# Patient Record
Sex: FEMALE | Race: WHITE | NOT HISPANIC OR LATINO | Employment: PART TIME | URBAN - METROPOLITAN AREA
[De-identification: names, ages, dates, MRNs, and addresses within clinical notes are randomized per-mention and may not be internally consistent; named-entity substitution may affect disease eponyms.]

---

## 2017-07-13 ENCOUNTER — APPOINTMENT (OUTPATIENT)
Dept: RADIOLOGY | Facility: IMAGING CENTER | Age: 37
End: 2017-07-13
Attending: NURSE PRACTITIONER
Payer: MEDICARE

## 2017-07-13 ENCOUNTER — OFFICE VISIT (OUTPATIENT)
Dept: URGENT CARE | Facility: CLINIC | Age: 37
End: 2017-07-13
Payer: MEDICARE

## 2017-07-13 VITALS
RESPIRATION RATE: 16 BRPM | BODY MASS INDEX: 46.88 KG/M2 | OXYGEN SATURATION: 98 % | TEMPERATURE: 98.2 F | SYSTOLIC BLOOD PRESSURE: 106 MMHG | HEART RATE: 92 BPM | WEIGHT: 248 LBS | DIASTOLIC BLOOD PRESSURE: 80 MMHG

## 2017-07-13 DIAGNOSIS — K59.00 CONSTIPATION, UNSPECIFIED CONSTIPATION TYPE: ICD-10-CM

## 2017-07-13 DIAGNOSIS — R10.84 GENERALIZED ABDOMINAL PAIN: ICD-10-CM

## 2017-07-13 PROCEDURE — 99214 OFFICE O/P EST MOD 30 MIN: CPT | Performed by: NURSE PRACTITIONER

## 2017-07-13 PROCEDURE — 74020 DX-ABDOMEN-2 VIEWS: CPT | Mod: TC | Performed by: NURSE PRACTITIONER

## 2017-07-13 RX ORDER — POLYETHYLENE GLYCOL 3350 17 G/17G
17 POWDER, FOR SOLUTION ORAL
Qty: 10 EACH | Refills: 0 | Status: SHIPPED | OUTPATIENT
Start: 2017-07-13 | End: 2018-11-20

## 2017-07-13 ASSESSMENT — ENCOUNTER SYMPTOMS
CHILLS: 0
DIARRHEA: 0
NAUSEA: 0
CONSTIPATION: 0
DIZZINESS: 0
ABDOMINAL PAIN: 1
HEADACHES: 0
BACK PAIN: 0
HEARTBURN: 0
FEVER: 0

## 2017-07-13 ASSESSMENT — LIFESTYLE VARIABLES: SUBSTANCE_ABUSE: 0

## 2017-07-13 NOTE — PROGRESS NOTES
Subjective:      Erin Santiago is a 36 y.o. female who presents with Abdominal Pain    PFSH reviewed and updated as necessary in EPIC electronic record with patient today.  Medications including OTC medications reviewed with patient.         Allergies   Allergen Reactions   • Acetaminophen      Unknown per patient and caregiver   • Other Misc      bees   • Penicillins Hives     Per patien   • Shellfish Allergy Hives     Get hives per patient     BIB .        HPI Having an allergic reaction to food she ate today. Her chest and stomach started to hurt 3 days ago. It has continued to hurt in epigastric. Denies c/p at rest but feels pain when she pushes hard on chest. Mild nausea most of the day. She has not eaten today. Hard BM today. Reports that it hurts when she tries to go to the bathroom to poop because it is hard. No other aggravating or alleviating factors.       Review of Systems   Unable to perform ROS: mental acuity   Constitutional: Negative for fever, chills and malaise/fatigue.   Gastrointestinal: Positive for abdominal pain. Negative for heartburn, nausea, diarrhea and constipation (??).   Musculoskeletal: Negative for back pain.   Skin: Negative for rash.   Neurological: Negative for dizziness and headaches.   Endo/Heme/Allergies: Negative for environmental allergies.   Psychiatric/Behavioral: Negative for substance abuse.          Objective:     /80 mmHg  Pulse 92  Temp(Src) 36.8 °C (98.2 °F)  Resp 16  Wt 112.492 kg (248 lb)  SpO2 98%     Physical Exam   Constitutional: She is oriented to person, place, and time. She appears well-developed and well-nourished.   HENT:   Head: Normocephalic.   Eyes: Pupils are equal, round, and reactive to light.   Neck: Normal range of motion.   Cardiovascular: Normal rate, regular rhythm and normal heart sounds.    Pulmonary/Chest: Effort normal and breath sounds normal. No respiratory distress.   Abdominal: Soft. Normal appearance. Bowel  sounds are decreased. There is no hepatosplenomegaly. There is tenderness in the epigastric area and left upper quadrant. There is no rigidity, no rebound, no guarding, no CVA tenderness, no tenderness at McBurney's point and negative Gonzalez's sign.       Musculoskeletal: Normal range of motion.   Neurological: She is alert and oriented to person, place, and time.   Skin: Skin is warm, dry and intact.   Psychiatric: She has a normal mood and affect. Her behavior is normal. Judgment and thought content normal.   Nursing note and vitals reviewed.         7/13/2017 5:25 PM    HISTORY/REASON FOR EXAM:  Gastrointestinal Complaint.  Upper abdominal pain since yesterday    TECHNIQUE/EXAM DESCRIPTION AND NUMBER OF VIEWS:  2 view(s) of the abdomen.    COMPARISON: None    FINDINGS:  No evidence for small bowel obstruction.  Increased colonic stool.  No evidence for pneumoperitoneum.  No gross mass or abnormal calcification.  No major bony abnormality is seen.         Impression        1.  No evidence of bowel obstruction or perforation.  2.  Increased colonic stool.         Reading Provider Reading Date     Ruy Méndez M.D. Jul 13, 2017          Assessment/Plan:     1. Generalized abdominal pain  [id number] VIEWS   2. Constipation, unspecified constipation type  polyethylene glycol/lytes (MIRALAX) Pack     Educated in proper administration of medication(s) ordered today including safety, possible SE, risks, benefits, rationale and alternatives to therapy.   Return to clinic or PCP 4-5 days if current symptoms are not resolving in a satisfactory manner or sooner if new or worsening symptoms occur.   Patient and or family advised differential diagnoses, signs and symptoms which would warrant further evaluation and /or emergent evaluation.   Patient was in agreement with this treatment plan and seemed to understand without barriers. All questions were encouraged and answered  Pt education done. Aftercare instructions given  to pt/ caregiver. Questions answered. Verbalizes good understanding.   Keep well hydrated  Increase fiber  In diet  Increase ambulation.   May return to work without restrictions if she feels well. Note given to her.   Results of all diagnostic tests/ screens discussed with patient.

## 2017-07-13 NOTE — MR AVS SNAPSHOT
Erin Santiago   2017 3:00 PM   Office Visit   MRN: [id number]    Department:  [facility]   Dept Phone:  [phone]    Description:  Female : 1980   Provider:  JAVIER Whittaker           Reason for Visit     Abdominal Pain x 2 days, mid upper abdominal pain and mid chest after eating a salad.  Pain is constant.      Allergies as of 2017     Allergen Noted Reactions    Acetaminophen 2016       Unknown per patient and caregiver    Other Misc 2014       bees    Penicillins 10/19/2007   Hives    Per patien    Shellfish Allergy 2014   Hives    Get hives per patient      You were diagnosed with     Generalized abdominal pain   [559974]       Constipation, unspecified constipation type   [8461180]         Vital Signs     Blood Pressure Pulse Temperature Respirations Weight Oxygen Saturation    106/80 mmHg 92 36.8 °C (98.2 °F) 16 112.492 kg (248 lb) 98%    Smoking Status                   Former Smoker           Basic Information     Date Of Birth Sex Race Ethnicity Preferred Language    1980 Female White Non- English      Problem List              ICD-10-CM Priority Class Noted - Resolved    Other specified noninflammatory disorder of vulva and perineum N90.89   2014 - Present    Partial epilepsy with impairment of consciousness, intractable (CMS-[facility]) G40.219   2014 - Present    Bipolar 1 disorder, depressed, moderate (CMS-[facility]) F31.32   2014 - Present      Health Maintenance        Date Due Completion Dates    IMM HEP B VACCINE (1 of 3 - Primary Series) 1978 ---    IMM DTaP/Tdap/Td Vaccine (1 - Tdap) 1997 ---    PAP SMEAR 1999 ---    IMM INFLUENZA (1) 2017 ---            Current Immunizations     No immunizations on file.      Below and/or attached are the medications your provider expects you to take. Review all of your home medications and newly ordered medications with your provider and/or  pharmacist. Follow medication instructions as directed by your provider and/or pharmacist. Please keep your medication list with you and share with your provider. Update the information when medications are discontinued, doses are changed, or new medications (including over-the-counter products) are added; and carry medication information at all times in the event of emergency situations     Allergies:  ACETAMINOPHEN - (reactions not documented)     OTHER MISC - (reactions not documented)     PENICILLINS - Hives     SHELLFISH ALLERGY - Hives               Medications  Valid as of: July 13, 2017 -  6:04 PM    Generic Name Brand Name Tablet Size Instructions for use    Ascorbic Acid (Tab) ascorbic acid 500 MG Take 500 mg by mouth every day.        Calcium (Tab) Calcium 600 MG Take 1 Tab by mouth every day.        Citalopram Hydrobromide   Take 40 mg by mouth every day.        Fluticasone Propionate (Suspension) FLONASE 50 MCG/ACT Spray 1 Spray in nose 2 times a day. Each Nostril         Ibuprofen (Cap) Ibuprofen 200 MG Take  by mouth.        LamoTRIgine (Tab) LAMICTAL 25 MG Take 1 Tab by mouth every day.        LevETIRAcetam   Take 250 mg by mouth 2 Times a Day. Indications: 1 in the morning, 2 at night        MetFORMIN HCl (Tab) GLUCOPHAGE 500 MG Take 500 mg by mouth every day.        Misc. Devices (Misc) Wheelchair  Wheelchair for use while unable to ambulate        NON SPECIFIED   Currently taking another medication to control anger.   Patient do not remember name of medication.        Polyethylene Glycol 3350 (Pack) MIRALAX  Take 1 Packet by mouth 1 time daily as needed.        QUEtiapine Fumarate (Tab) SEROQUEL 25 MG Take 25 mg by mouth every day.        TraMADol HCl (Tab) ULTRAM 50 MG Take 1-2 Tabs by mouth every four hours as needed for Mild Pain.        .                 Medicines prescribed today were sent to:     [facility] - [facility] - [id number]    [address] [place]  [address]    Phone: [phone] Fax: [phone]    Open 24 Hours?: No      Medication refill instructions:       If your prescription bottle indicates you have medication refills left, it is not necessary to call your provider’s office. Please contact your pharmacy and they will refill your medication.    If your prescription bottle indicates you do not have any refills left, you may request refills at any time through one of the following ways: The online [employer] system (except Urgent Care), by calling your provider’s office, or by asking your pharmacy to contact your provider’s office with a refill request. Medication refills are processed only during regular business hours and may not be available until the next business day. Your provider may request additional information or to have a follow-up visit with you prior to refilling your medication.   *Please Note: Medication refills are assigned a new Rx number when refilled electronically. Your pharmacy may indicate that no refills were authorized even though a new prescription for the same medication is available at the pharmacy. Please request the medicine by name with the pharmacy before contacting your provider for a refill.        Your To Do List     Future Labs/Procedures Complete By Expires    [id number] VIEWS  As directed 7/13/2018      Instructions    Constipation, Adult  Constipation is when a person has fewer than three bowel movements a week, has difficulty having a bowel movement, or has stools that are dry, hard, or larger than normal. As people grow older, constipation is more common. A low-fiber diet, not taking in enough fluids, and taking certain medicines may make constipation worse.   CAUSES   · Certain medicines, such as antidepressants, pain medicine, iron supplements, antacids, and water pills.    · Certain diseases, such as diabetes, irritable bowel syndrome (IBS), thyroid disease, or depression.    · Not drinking enough water.    · Not  eating enough fiber-rich foods.    · Stress or travel.    · Lack of physical activity or exercise.    · Ignoring the urge to have a bowel movement.    · Using laxatives too much.    SIGNS AND SYMPTOMS   · Having fewer than three bowel movements a week.    · Straining to have a bowel movement.    · Having stools that are hard, dry, or larger than normal.    · Feeling full or bloated.    · Pain in the lower abdomen.    · Not feeling relief after having a bowel movement.    DIAGNOSIS   Your health care provider will take a medical history and perform a physical exam. Further testing may be done for severe constipation. Some tests may include:  · A barium enema X-ray to examine your rectum, colon, and, sometimes, your small intestine.    · A sigmoidoscopy to examine your lower colon.    · A colonoscopy to examine your entire colon.  TREATMENT   Treatment will depend on the severity of your constipation and what is causing it. Some dietary treatments include drinking more fluids and eating more fiber-rich foods. Lifestyle treatments may include regular exercise. If these diet and lifestyle recommendations do not help, your health care provider may recommend taking over-the-counter laxative medicines to help you have bowel movements. Prescription medicines may be prescribed if over-the-counter medicines do not work.   HOME CARE INSTRUCTIONS   · Eat foods that have a lot of fiber, such as fruits, vegetables, whole grains, and beans.  · Limit foods high in fat and processed sugars, such as french fries, hamburgers, cookies, candies, and soda.    · A fiber supplement may be added to your diet if you cannot get enough fiber from foods.    · Drink enough fluids to keep your urine clear or pale yellow.    · Exercise regularly or as directed by your health care provider.    · Go to the restroom when you have the urge to go. Do not hold it.    · Only take over-the-counter or prescription medicines as directed by your health care  provider. Do not take other medicines for constipation without talking to your health care provider first.    SEEK IMMEDIATE MEDICAL CARE IF:   · You have bright red blood in your stool.    · Your constipation lasts for more than 4 days or gets worse.    · You have abdominal or rectal pain.    · You have thin, pencil-like stools.    · You have unexplained weight loss.  MAKE SURE YOU:   · Understand these instructions.  · Will watch your condition.  · Will get help right away if you are not doing well or get worse.     This information is not intended to replace advice given to you by your health care provider. Make sure you discuss any questions you have with your health care provider.     Document Released: 09/15/2005 Document Revised: 01/08/2016 Document Reviewed: 09/29/2014  [employer] Interactive Patient Education ©2016 [employer]            [employer] Access Code: [email or web address]  Expires: 8/12/2017  6:04 PM    [employer]  A secure, online tool to manage your health information     [employer]s [employer]® is a secure, online tool that connects you to your personalized health information from the privacy of your home -- day or night - making it very easy for you to manage your healthcare. Once the activation process is completed, you can even access your medical information using the [employer] john, which is available for free in the Apple John store or Google Play store.     [employer] provides the following levels of access (as shown below):   My Chart Features   [facility] Primary [facility] Doctor [facility]  Specialists [facility]  Urgent  Care Non-[facility]  Primary Care  Doctor   Email your healthcare team securely and privately 24/7 X X X    Manage appointments: schedule your next appointment; view details of past/upcoming appointments X      Request prescription refills. X      View recent personal medical records, including lab and immunizations X X X X   View health record, including health history, allergies, medications X X X X    Read reports about your outpatient visits, procedures, consult and ER notes X X X X   See your discharge summary, which is a recap of your hospital and/or ER visit that includes your diagnosis, lab results, and care plan. X X       How to register for [employer]:  1. Go to  [email or web address].  2. Click on the Sign Up Now box, which takes you to the New Member Sign Up page. You will need to provide the following information:  a. Enter your [employer]Copyright Access Code exactly as it appears at the top of this page. (You will not need to use this code after you’ve completed the sign-up process. If you do not sign up before the expiration date, you must request a new code.)   b. Enter your date of birth.   c. Enter your home email address.   d. Click Submit, and follow the next screen’s instructions.  3. Create a [employer] ID. This will be your iCopyright login ID and cannot be changed, so think of one that is secure and easy to remember.  4. Create a [employer] password. You can change your password at any time.  5. Enter your Password Reset Question and Answer. This can be used at a later time if you forget your password.   6. Enter your e-mail address. This allows you to receive e-mail notifications when new information is available in [employer]Copyright.  7. Click Sign Up. You can now view your health information.    For assistance activating your [employer] account, call [phone]

## 2017-07-13 NOTE — Clinical Note
July 13, 2017       Patient: Erin Santiago   YOB: 1980   Date of Visit: 7/13/2017         To Whom It May Concern:    It is my medical opinion that Erin Santiago remain out of work until 07/14/17.              Sincerely,          JAVIER Whittaker  Electronically Signed

## 2017-07-14 NOTE — PATIENT INSTRUCTIONS
Constipation, Adult  Constipation is when a person has fewer than three bowel movements a week, has difficulty having a bowel movement, or has stools that are dry, hard, or larger than normal. As people grow older, constipation is more common. A low-fiber diet, not taking in enough fluids, and taking certain medicines may make constipation worse.   CAUSES   · Certain medicines, such as antidepressants, pain medicine, iron supplements, antacids, and water pills.    · Certain diseases, such as diabetes, irritable bowel syndrome (IBS), thyroid disease, or depression.    · Not drinking enough water.    · Not eating enough fiber-rich foods.    · Stress or travel.    · Lack of physical activity or exercise.    · Ignoring the urge to have a bowel movement.    · Using laxatives too much.    SIGNS AND SYMPTOMS   · Having fewer than three bowel movements a week.    · Straining to have a bowel movement.    · Having stools that are hard, dry, or larger than normal.    · Feeling full or bloated.    · Pain in the lower abdomen.    · Not feeling relief after having a bowel movement.    DIAGNOSIS   Your health care provider will take a medical history and perform a physical exam. Further testing may be done for severe constipation. Some tests may include:  · A barium enema X-ray to examine your rectum, colon, and, sometimes, your small intestine.    · A sigmoidoscopy to examine your lower colon.    · A colonoscopy to examine your entire colon.  TREATMENT   Treatment will depend on the severity of your constipation and what is causing it. Some dietary treatments include drinking more fluids and eating more fiber-rich foods. Lifestyle treatments may include regular exercise. If these diet and lifestyle recommendations do not help, your health care provider may recommend taking over-the-counter laxative medicines to help you have bowel movements. Prescription medicines may be prescribed if over-the-counter medicines do not work.    HOME CARE INSTRUCTIONS   · Eat foods that have a lot of fiber, such as fruits, vegetables, whole grains, and beans.  · Limit foods high in fat and processed sugars, such as french fries, hamburgers, cookies, candies, and soda.    · A fiber supplement may be added to your diet if you cannot get enough fiber from foods.    · Drink enough fluids to keep your urine clear or pale yellow.    · Exercise regularly or as directed by your health care provider.    · Go to the restroom when you have the urge to go. Do not hold it.    · Only take over-the-counter or prescription medicines as directed by your health care provider. Do not take other medicines for constipation without talking to your health care provider first.    SEEK IMMEDIATE MEDICAL CARE IF:   · You have bright red blood in your stool.    · Your constipation lasts for more than 4 days or gets worse.    · You have abdominal or rectal pain.    · You have thin, pencil-like stools.    · You have unexplained weight loss.  MAKE SURE YOU:   · Understand these instructions.  · Will watch your condition.  · Will get help right away if you are not doing well or get worse.     This information is not intended to replace advice given to you by your health care provider. Make sure you discuss any questions you have with your health care provider.     Document Released: 09/15/2005 Document Revised: 01/08/2016 Document Reviewed: 09/29/2014  [employer] Interactive Patient Education ©2016 [employer]

## 2018-01-19 ENCOUNTER — HOSPITAL ENCOUNTER (OUTPATIENT)
Facility: MEDICAL CENTER | Age: 38
End: 2018-01-19
Payer: COMMERCIAL

## 2018-01-19 LAB
ALBUMIN SERPL BCP-MCNC: 4.7 G/DL (ref 3.2–4.9)
ALBUMIN/GLOB SERPL: 1.8 G/DL
ALP SERPL-CCNC: 54 U/L (ref 30–99)
ALT SERPL-CCNC: 11 U/L (ref 2–50)
ANION GAP SERPL CALC-SCNC: 6 MMOL/L (ref 0–11.9)
AST SERPL-CCNC: 12 U/L (ref 12–45)
BDY FAT % MEASURED: 44 %
BILIRUB SERPL-MCNC: 0.7 MG/DL (ref 0.1–1.5)
BP DIAS: 78 MMHG
BP SYS: 122 MMHG
BUN SERPL-MCNC: 11 MG/DL (ref 8–22)
CALCIUM SERPL-MCNC: 8.9 MG/DL (ref 8.5–10.5)
CHLORIDE SERPL-SCNC: 103 MMOL/L (ref 96–112)
CHOLEST SERPL-MCNC: 112 MG/DL (ref 100–199)
CO2 SERPL-SCNC: 29 MMOL/L (ref 20–33)
CREAT SERPL-MCNC: 0.92 MG/DL (ref 0.5–1.4)
DIABETES HTDIA: YES
EST. AVERAGE GLUCOSE BLD GHB EST-MCNC: 108 MG/DL
EVENT NAME HTEVT: NORMAL
GLOBULIN SER CALC-MCNC: 2.6 G/DL (ref 1.9–3.5)
GLUCOSE SERPL-MCNC: 103 MG/DL (ref 65–99)
HBA1C MFR BLD: 5.4 % (ref 0–5.6)
HDLC SERPL-MCNC: 55 MG/DL
HYPERTENSION HTHYP: NO
LDLC SERPL CALC-MCNC: 45 MG/DL
POTASSIUM SERPL-SCNC: 4.3 MMOL/L (ref 3.6–5.5)
PROT SERPL-MCNC: 7.3 G/DL (ref 6–8.2)
SCREENING LOC CITY HTCIT: NORMAL
SCREENING LOC STATE HTSTA: NORMAL
SCREENING LOCATION HTLOC: NORMAL
SODIUM SERPL-SCNC: 138 MMOL/L (ref 135–145)
SUBSCRIBER ID HTSID: NORMAL
TRIGL SERPL-MCNC: 60 MG/DL (ref 0–149)

## 2018-03-15 ENCOUNTER — APPOINTMENT (OUTPATIENT)
Dept: NEUROLOGY | Facility: MEDICAL CENTER | Age: 38
End: 2018-03-15
Payer: MEDICARE

## 2018-07-17 ENCOUNTER — OFFICE VISIT (OUTPATIENT)
Dept: NEUROLOGY | Facility: MEDICAL CENTER | Age: 38
End: 2018-07-17
Payer: MEDICARE

## 2018-07-17 VITALS
DIASTOLIC BLOOD PRESSURE: 74 MMHG | HEIGHT: 60 IN | TEMPERATURE: 98.4 F | RESPIRATION RATE: 16 BRPM | SYSTOLIC BLOOD PRESSURE: 110 MMHG | BODY MASS INDEX: 39.93 KG/M2 | HEART RATE: 75 BPM | WEIGHT: 203.4 LBS | OXYGEN SATURATION: 99 %

## 2018-07-17 DIAGNOSIS — R56.9 SEIZURE (HCC): ICD-10-CM

## 2018-07-17 DIAGNOSIS — R63.4 WEIGHT LOSS: ICD-10-CM

## 2018-07-17 DIAGNOSIS — F31.32 BIPOLAR 1 DISORDER, DEPRESSED, MODERATE (HCC): ICD-10-CM

## 2018-07-17 DIAGNOSIS — G20.C PARKINSONISM, UNSPECIFIED PARKINSONISM TYPE: ICD-10-CM

## 2018-07-17 DIAGNOSIS — E28.2 POLYCYSTIC OVARY: ICD-10-CM

## 2018-07-17 DIAGNOSIS — R41.89 COGNITIVE DECLINE: ICD-10-CM

## 2018-07-17 DIAGNOSIS — G40.219 PARTIAL EPILEPSY WITH IMPAIRMENT OF CONSCIOUSNESS, INTRACTABLE (HCC): ICD-10-CM

## 2018-07-17 PROBLEM — G20.A1 PARKINSON DISEASE (HCC): Status: ACTIVE | Noted: 2018-07-17

## 2018-07-17 PROCEDURE — 99204 OFFICE O/P NEW MOD 45 MIN: CPT | Performed by: PSYCHIATRY & NEUROLOGY

## 2018-07-17 NOTE — PATIENT INSTRUCTIONS
Resting tremor - both sides-- L>R  Rigidity both sides  Bradykinesia both sides  Postural reflex : tendency to fall  Masked face,     IMPRESSION:    1. Bradykinesia and some tremor noticed in the past 2 years or so-- consistent with Parkinsonism Stage III  2. Mentally challenged -- was in group home for years  3. Hx of seizure, Psychiatric   4. Family hx of psychiatric disorder   5. Weight loss    PLAN/RECOMMENDATIONS:    I do agree with the legal guardian that the patient has parkinsonism    We will offer MRI of brain, EEG and blood tests  mo      ________________________________________________________________________    Drug-induced parkinsonism is the second most common cause of parkinsonism after Parkinson's disease and their distinction has crucial implications in terms of management and prognosis. However, differentiating between these conditions can be challenging on a clinical ground, especially in the early stages. ( actually, traditional views, it is impossible to differentiate these two based on clinical examination only)    Parkinsonism Relat Disord. 2014 Aug;20(8):808-14. doi: 10.1016/j.parkreldis.2014.05.011. Epub 2014 Trey 3.  Differentiating drug-induced parkinsonism from Parkinson's disease: an update on non-motor symptoms and investigations.  Sumeet F1, Erro R2, Marangi A3, Dorantes K4, Tinazzi M3.    Radiology. 2016 Jun;279(3):849-58. doi: 10.1148/[id number]. Epub 2015 Dec 21.  Drug-induced Parkinsonism versus Idiopathic Parkinson Disease: Utility of Nigrosome 1 with 3-T Imaging.    Sung YH1, Noh Y1, Thomas J1, Tere EY1.  ________________________________________________________________________              SIGNATURE:  Lubna Cerrato    CC:  Ulisses Fierro M.D.
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
## 2018-07-23 LAB
25(OH)D3+25(OH)D2 SERPL-MCNC: 37.4 NG/ML (ref 30–100)
ANA SER QL: NEGATIVE
BUN SERPL-MCNC: 13 MG/DL (ref 6–20)
BUN/CREAT SERPL: 15 (ref 9–23)
CALCIUM SERPL-MCNC: 9.2 MG/DL (ref 8.7–10.2)
CARDIOLIPIN IGA SER IA-ACNC: <9 APL U/ML (ref 0–11)
CARDIOLIPIN IGG SER IA-ACNC: <9 GPL U/ML (ref 0–14)
CARDIOLIPIN IGM SER IA-ACNC: 22 MPL U/ML (ref 0–12)
CHLORIDE SERPL-SCNC: 104 MMOL/L (ref 96–106)
CO2 SERPL-SCNC: 22 MMOL/L (ref 20–29)
CREAT SERPL-MCNC: 0.85 MG/DL (ref 0.57–1)
CRP SERPL-MCNC: 6.6 MG/L (ref 0–4.9)
ERYTHROCYTE [SEDIMENTATION RATE] IN BLOOD BY WESTERGREN METHOD: 2 MM/HR (ref 0–32)
GLUCOSE SERPL-MCNC: 93 MG/DL (ref 65–99)
IF AFRICAN AMERICAN  100797: 100 ML/MIN/1.73
IF NON AFRICAN AMER 100791: 87 ML/MIN/1.73
MAGNESIUM SERPL-MCNC: 2 MG/DL (ref 1.6–2.3)
POTASSIUM SERPL-SCNC: 4.6 MMOL/L (ref 3.5–5.2)
SODIUM SERPL-SCNC: 142 MMOL/L (ref 134–144)
THYROGLOB AB SERPL-ACNC: <1 IU/ML (ref 0–0.9)
THYROPEROXIDASE AB SERPL-ACNC: 14 IU/ML (ref 0–34)
TSH SERPL DL<=0.005 MIU/L-ACNC: 0.77 UIU/ML (ref 0.45–4.5)
VIT B1 BLD-SCNC: 208.3 NMOL/L (ref 66.5–200)
VIT B12 SERPL-MCNC: 661 PG/ML (ref 232–1245)
VIT B6 SERPL-MCNC: 9.5 UG/L (ref 2–32.8)

## 2018-07-31 ENCOUNTER — HOSPITAL ENCOUNTER (OUTPATIENT)
Dept: RADIOLOGY | Facility: MEDICAL CENTER | Age: 38
End: 2018-07-31
Attending: PSYCHIATRY & NEUROLOGY
Payer: MEDICARE

## 2018-07-31 DIAGNOSIS — R56.9 SEIZURE (HCC): ICD-10-CM

## 2018-07-31 DIAGNOSIS — E28.2 POLYCYSTIC OVARY: ICD-10-CM

## 2018-07-31 DIAGNOSIS — R63.4 WEIGHT LOSS: ICD-10-CM

## 2018-07-31 DIAGNOSIS — G20.C PARKINSONISM, UNSPECIFIED PARKINSONISM TYPE: ICD-10-CM

## 2018-07-31 DIAGNOSIS — F31.32 BIPOLAR 1 DISORDER, DEPRESSED, MODERATE (HCC): ICD-10-CM

## 2018-07-31 DIAGNOSIS — G40.219 PARTIAL EPILEPSY WITH IMPAIRMENT OF CONSCIOUSNESS, INTRACTABLE (HCC): ICD-10-CM

## 2018-07-31 DIAGNOSIS — R41.89 COGNITIVE DECLINE: ICD-10-CM

## 2018-07-31 PROCEDURE — A9585 GADOBUTROL INJECTION: HCPCS | Performed by: PSYCHIATRY & NEUROLOGY

## 2018-07-31 PROCEDURE — 700117 HCHG RX CONTRAST REV CODE 255: Performed by: PSYCHIATRY & NEUROLOGY

## 2018-07-31 PROCEDURE — 70553 MRI BRAIN STEM W/O & W/DYE: CPT

## 2018-07-31 RX ORDER — GADOBUTROL 604.72 MG/ML
10 INJECTION INTRAVENOUS ONCE
Status: COMPLETED | OUTPATIENT
Start: 2018-07-31 | End: 2018-07-31

## 2018-07-31 RX ADMIN — GADOBUTROL 10 ML: 604.72 INJECTION INTRAVENOUS at 09:45

## 2018-11-09 ENCOUNTER — TELEPHONE (OUTPATIENT)
Dept: NEUROLOGY | Facility: MEDICAL CENTER | Age: 38
End: 2018-11-09

## 2018-11-09 ENCOUNTER — NON-PROVIDER VISIT (OUTPATIENT)
Dept: NEUROLOGY | Facility: MEDICAL CENTER | Age: 38
End: 2018-11-09
Payer: MEDICARE

## 2018-11-09 DIAGNOSIS — G40.219 PARTIAL EPILEPSY WITH IMPAIRMENT OF CONSCIOUSNESS, INTRACTABLE (HCC): ICD-10-CM

## 2018-11-09 DIAGNOSIS — R56.9 SEIZURE (HCC): ICD-10-CM

## 2018-11-09 DIAGNOSIS — R63.4 WEIGHT LOSS: ICD-10-CM

## 2018-11-09 DIAGNOSIS — G20.C PARKINSONISM, UNSPECIFIED PARKINSONISM TYPE: ICD-10-CM

## 2018-11-09 DIAGNOSIS — F31.32 BIPOLAR 1 DISORDER, DEPRESSED, MODERATE (HCC): ICD-10-CM

## 2018-11-09 DIAGNOSIS — R41.89 COGNITIVE DECLINE: ICD-10-CM

## 2018-11-09 PROCEDURE — 95816 EEG AWAKE AND DROWSY: CPT | Performed by: PSYCHIATRY & NEUROLOGY

## 2018-11-09 NOTE — PROCEDURES
ELECTROENCEPHALOGRAM REPORT      Referring provider: Dr. ARIAS    DOS:   11/9/2018      INDICATION:  Erin Santiago 38 y.o. female presenting with EEG for pt w/ a history of seizures- no eeg since 2014. sister noticed the parient become more slow within the last 2 years. Pt has been in group home since age 13 y.o, sister is now guardian, Pt was put in group home due to behavior issues.  Pt reports she had been seizure free and doing well on current seizure medication.     HX of phychiatric, bradykinesia, parkinsonism, anxiety, depression, diabetes, sleep apnea, epilepsy.    CURRENT ANTIEPILEPTIC REGIMEN: Keppra + Lamictal     DURATION: 24 minutes      TECHNIQUE: A 30-channel video electroencephalogram (VEEG) was performed in accordance with the international 10-20 system. This digital study was reviewed in bipolar and referential montages. The recording examined the patient during wakeful, drowsy and sleep states.         DESCRIPTION OF THE RECORD:  During the awake state, background shows symmetrical 9-10 Hz alpha activity posteriorly with amplitude of 70 mV.  There was reactivity with eye opening/closure.  Normal anterior-posterior gradient was noted with faster beta frequencies seen anteriorly.  During drowsiness, high-amplitude delta frequencies were seen.    During the sleep state, background shows diffuse high-amplitude 4-5 Hz delta activity.  Symmetrical high-amplitude sleep spindles and vertex sharp activities were seen in the central leads.    ACTIVATION PROCEDURES:   Hyperventilation was performed by the patient. The technician performing the test noted good effort. This technique produced electroencephalographic changes in keeping with the expected bilaterally synchronous, frontally predominant, high amplitude slow waves build up.     Intermittent Photic stimulation was  performed in a stepwise fashion from 1 to 30 Hz and elicited a normal response (photic driving), most noticeable in the posterior  leads.      ICTAL AND/OR INTERICTAL FINDINGS:   No focal or generalized epileptiform activity was noted. No regional slowing was seen during this study.  No seizures were recorded during the study.     EVENT(S):  NONE    EKG: sampling review of EKG recording demonstrated regular rhythm      INTERPRETATION:       ________________________________________________________________________    This is normal routine EEG recording in the awake and drowsy/sleep state(s).    This scalp EEG remains not remarkable    Of note, unremarkable EEG does not completely exclude the diagnosis  of seizures since seizure is an episodic phenomena and frontal lobe seizures could have normal scalp EEG. Clinical correlation may help     If clinical suspicion of seizure remains high.  Prolonged outpatient EEG   monitoring may be of help.    EEG 11/09/18 8:22 PM  ________________________________________________________________________

## 2018-11-14 ENCOUNTER — TELEPHONE (OUTPATIENT)
Dept: NEUROLOGY | Facility: MEDICAL CENTER | Age: 38
End: 2018-11-14

## 2018-11-20 ENCOUNTER — OFFICE VISIT (OUTPATIENT)
Dept: NEUROLOGY | Facility: MEDICAL CENTER | Age: 38
End: 2018-11-20
Payer: MEDICARE

## 2018-11-20 VITALS
SYSTOLIC BLOOD PRESSURE: 120 MMHG | OXYGEN SATURATION: 98 % | RESPIRATION RATE: 16 BRPM | WEIGHT: 197.8 LBS | TEMPERATURE: 97.7 F | HEART RATE: 81 BPM | BODY MASS INDEX: 38.83 KG/M2 | DIASTOLIC BLOOD PRESSURE: 80 MMHG | HEIGHT: 60 IN

## 2018-11-20 DIAGNOSIS — R56.9 SEIZURE (HCC): ICD-10-CM

## 2018-11-20 DIAGNOSIS — G20.A1 PARKINSON DISEASE: ICD-10-CM

## 2018-11-20 PROCEDURE — 99214 OFFICE O/P EST MOD 30 MIN: CPT | Performed by: PSYCHIATRY & NEUROLOGY

## 2018-11-20 RX ORDER — THIAMINE MONONITRATE (VIT B1) 100 MG
100 TABLET ORAL DAILY
Qty: 90 TAB | Refills: 1 | Status: SHIPPED | OUTPATIENT
Start: 2018-11-20 | End: 2019-04-08 | Stop reason: SDUPTHER

## 2018-11-20 NOTE — PATIENT INSTRUCTIONS
IMPRESSION:    1. Bradykinesia and some tremor noticed in the past 2 years or so-- consistent with Parkinsonism Stage III  2. Mentally challenged -- was in group home for years  3. Hx of seizure, Psychiatric   4. Family hx of psychiatric disorder   5. Weight loss    PLAN/RECOMMENDATIONS:    I do agree with the legal guardian that the patient has parkinsonism    This time, we will start therapeutic trial of Sinemet   Sinemet 25/100 half tab  3 times per day for 2 weeks, then Sinemet 25/100 one tab three times per day  Call us in one month or any other side effects-- we may increase Sinemet further on the phone   Also please take Vit B1 100mg daily    ________________________________________________________________________     This is normal routine EEG recording in the awake and drowsy/sleep state(s).     This scalp EEG remains not remarkable     Of note, unremarkable EEG does not completely exclude the diagnosis  of seizures since seizure is an episodic phenomena and frontal lobe seizures could have normal scalp EEG. Clinical correlation may help     If clinical suspicion of seizure remains high.  Prolonged outpatient EEG   monitoring may be of help.     EEG 11/09/18 8:22 PM  ________________________________________________________________________        ________________________________________________________________________    Drug-induced parkinsonism is the second most common cause of parkinsonism after Parkinson's disease and their distinction has crucial implications in terms of management and prognosis. However, differentiating between these conditions can be challenging on a clinical ground, especially in the early stages. ( actually, traditional views, it is impossible to differentiate these two based on clinical examination only)    Parkinsonism Relat Disord. 2014 Aug;20(8):808-14. doi: 10.1016/j.parkreldis.2014.05.011. Epub 2014 Jun 3.  Differentiating drug-induced parkinsonism from Parkinson's disease:  an update on non-motor symptoms and investigations.  Sumeet F1, Erro R2, Marangi A3, Doranets K4, Tinazzi M3.    Radiology. 2016 Trey;279(3):849-58. doi: 10.1148/radiol.4215632723. Epub 2015 Dec 21.  Drug-induced Parkinsonism versus Idiopathic Parkinson Disease: Utility of Nigrosome 1 with 3-T Imaging.    Gee YH1, Nococo Y1, Thomas J1, Tere EY1.  ________________________________________________________________________              SIGNATURE:  Lubna Cerrato    CC:  Ulisses Fierro M.D.    MRI 2018-- not remarkable
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
## 2019-03-06 ENCOUNTER — APPOINTMENT (OUTPATIENT)
Dept: RADIOLOGY | Facility: MEDICAL CENTER | Age: 39
End: 2019-03-06
Attending: EMERGENCY MEDICINE
Payer: MEDICARE

## 2019-03-06 ENCOUNTER — HOSPITAL ENCOUNTER (OUTPATIENT)
Facility: MEDICAL CENTER | Age: 39
End: 2019-03-08
Attending: EMERGENCY MEDICINE | Admitting: FAMILY MEDICINE
Payer: MEDICARE

## 2019-03-06 DIAGNOSIS — R53.1 WEAKNESS: ICD-10-CM

## 2019-03-06 DIAGNOSIS — R41.0 CONFUSION: ICD-10-CM

## 2019-03-06 PROBLEM — I63.9 STROKE (CEREBRUM) (HCC): Status: ACTIVE | Noted: 2019-03-06

## 2019-03-06 PROBLEM — G93.41 METABOLIC ENCEPHALOPATHY: Status: ACTIVE | Noted: 2019-03-06

## 2019-03-06 LAB
ABO GROUP BLD: NORMAL
ALBUMIN SERPL BCP-MCNC: 4.5 G/DL (ref 3.2–4.9)
ALBUMIN/GLOB SERPL: 1.7 G/DL
ALP SERPL-CCNC: 58 U/L (ref 30–99)
ALT SERPL-CCNC: 8 U/L (ref 2–50)
ANION GAP SERPL CALC-SCNC: 8 MMOL/L (ref 0–11.9)
APTT PPP: 23.7 SEC (ref 24.7–36)
AST SERPL-CCNC: 12 U/L (ref 12–45)
BASOPHILS # BLD AUTO: 1.3 % (ref 0–1.8)
BASOPHILS # BLD: 0.13 K/UL (ref 0–0.12)
BILIRUB SERPL-MCNC: 0.8 MG/DL (ref 0.1–1.5)
BLD GP AB SCN SERPL QL: NORMAL
BUN SERPL-MCNC: 11 MG/DL (ref 8–22)
CALCIUM SERPL-MCNC: 9.9 MG/DL (ref 8.5–10.5)
CHLORIDE SERPL-SCNC: 106 MMOL/L (ref 96–112)
CK SERPL-CCNC: 56 U/L (ref 0–154)
CO2 SERPL-SCNC: 26 MMOL/L (ref 20–33)
CREAT SERPL-MCNC: 0.74 MG/DL (ref 0.5–1.4)
EKG IMPRESSION: NORMAL
EOSINOPHIL # BLD AUTO: 0.06 K/UL (ref 0–0.51)
EOSINOPHIL NFR BLD: 0.6 % (ref 0–6.9)
ERYTHROCYTE [DISTWIDTH] IN BLOOD BY AUTOMATED COUNT: 48.5 FL (ref 35.9–50)
GLOBULIN SER CALC-MCNC: 2.7 G/DL (ref 1.9–3.5)
GLUCOSE SERPL-MCNC: 81 MG/DL (ref 65–99)
HCG SERPL QL: NEGATIVE
HCT VFR BLD AUTO: 49.6 % (ref 37–47)
HGB BLD-MCNC: 15.2 G/DL (ref 12–16)
IMM GRANULOCYTES # BLD AUTO: 0.02 K/UL (ref 0–0.11)
IMM GRANULOCYTES NFR BLD AUTO: 0.2 % (ref 0–0.9)
INR PPP: 1.09 (ref 0.87–1.13)
LYMPHOCYTES # BLD AUTO: 3.12 K/UL (ref 1–4.8)
LYMPHOCYTES NFR BLD: 30.6 % (ref 22–41)
MCH RBC QN AUTO: 30.7 PG (ref 27–33)
MCHC RBC AUTO-ENTMCNC: 30.6 G/DL (ref 33.6–35)
MCV RBC AUTO: 100.2 FL (ref 81.4–97.8)
MONOCYTES # BLD AUTO: 0.67 K/UL (ref 0–0.85)
MONOCYTES NFR BLD AUTO: 6.6 % (ref 0–13.4)
NEUTROPHILS # BLD AUTO: 6.21 K/UL (ref 2–7.15)
NEUTROPHILS NFR BLD: 60.7 % (ref 44–72)
NRBC # BLD AUTO: 0 K/UL
NRBC BLD-RTO: 0 /100 WBC
PLATELET # BLD AUTO: 295 K/UL (ref 164–446)
PMV BLD AUTO: 9.7 FL (ref 9–12.9)
POTASSIUM SERPL-SCNC: 4.5 MMOL/L (ref 3.6–5.5)
PROT SERPL-MCNC: 7.2 G/DL (ref 6–8.2)
PROTHROMBIN TIME: 14.2 SEC (ref 12–14.6)
RBC # BLD AUTO: 4.95 M/UL (ref 4.2–5.4)
RH BLD: NORMAL
SODIUM SERPL-SCNC: 140 MMOL/L (ref 135–145)
T4 FREE SERPL-MCNC: 1.19 NG/DL (ref 0.53–1.43)
TROPONIN I SERPL-MCNC: <0.01 NG/ML (ref 0–0.04)
TSH SERPL DL<=0.005 MIU/L-ACNC: 1.06 UIU/ML (ref 0.38–5.33)
WBC # BLD AUTO: 10.2 K/UL (ref 4.8–10.8)

## 2019-03-06 PROCEDURE — 70496 CT ANGIOGRAPHY HEAD: CPT

## 2019-03-06 PROCEDURE — 86850 RBC ANTIBODY SCREEN: CPT

## 2019-03-06 PROCEDURE — 94760 N-INVAS EAR/PLS OXIMETRY 1: CPT

## 2019-03-06 PROCEDURE — 85025 COMPLETE CBC W/AUTO DIFF WBC: CPT | Mod: 91

## 2019-03-06 PROCEDURE — 80061 LIPID PANEL: CPT

## 2019-03-06 PROCEDURE — 93005 ELECTROCARDIOGRAM TRACING: CPT

## 2019-03-06 PROCEDURE — 700111 HCHG RX REV CODE 636 W/ 250 OVERRIDE (IP): Performed by: NURSE PRACTITIONER

## 2019-03-06 PROCEDURE — 70498 CT ANGIOGRAPHY NECK: CPT

## 2019-03-06 PROCEDURE — 99285 EMERGENCY DEPT VISIT HI MDM: CPT

## 2019-03-06 PROCEDURE — 71045 X-RAY EXAM CHEST 1 VIEW: CPT

## 2019-03-06 PROCEDURE — 99220 PR INITIAL OBSERVATION CARE,LEVL III: CPT | Performed by: FAMILY MEDICINE

## 2019-03-06 PROCEDURE — 700117 HCHG RX CONTRAST REV CODE 255: Performed by: EMERGENCY MEDICINE

## 2019-03-06 PROCEDURE — 0042T CT-CEREBRAL PERFUSION ANALYSIS: CPT

## 2019-03-06 PROCEDURE — 70450 CT HEAD/BRAIN W/O DYE: CPT

## 2019-03-06 PROCEDURE — 84703 CHORIONIC GONADOTROPIN ASSAY: CPT

## 2019-03-06 PROCEDURE — 83036 HEMOGLOBIN GLYCOSYLATED A1C: CPT

## 2019-03-06 PROCEDURE — 86901 BLOOD TYPING SEROLOGIC RH(D): CPT

## 2019-03-06 PROCEDURE — 96375 TX/PRO/DX INJ NEW DRUG ADDON: CPT | Mod: XU

## 2019-03-06 PROCEDURE — G0378 HOSPITAL OBSERVATION PER HR: HCPCS

## 2019-03-06 PROCEDURE — 84439 ASSAY OF FREE THYROXINE: CPT

## 2019-03-06 PROCEDURE — 84484 ASSAY OF TROPONIN QUANT: CPT

## 2019-03-06 PROCEDURE — 700102 HCHG RX REV CODE 250 W/ 637 OVERRIDE(OP): Performed by: FAMILY MEDICINE

## 2019-03-06 PROCEDURE — 96374 THER/PROPH/DIAG INJ IV PUSH: CPT

## 2019-03-06 PROCEDURE — 36415 COLL VENOUS BLD VENIPUNCTURE: CPT

## 2019-03-06 PROCEDURE — 99285 EMERGENCY DEPT VISIT HI MDM: CPT | Performed by: PSYCHIATRY & NEUROLOGY

## 2019-03-06 PROCEDURE — 84443 ASSAY THYROID STIM HORMONE: CPT

## 2019-03-06 PROCEDURE — 86900 BLOOD TYPING SEROLOGIC ABO: CPT

## 2019-03-06 PROCEDURE — 85730 THROMBOPLASTIN TIME PARTIAL: CPT

## 2019-03-06 PROCEDURE — 700105 HCHG RX REV CODE 258: Performed by: NURSE PRACTITIONER

## 2019-03-06 PROCEDURE — 82550 ASSAY OF CK (CPK): CPT

## 2019-03-06 PROCEDURE — 96372 THER/PROPH/DIAG INJ SC/IM: CPT | Mod: XU

## 2019-03-06 PROCEDURE — 85610 PROTHROMBIN TIME: CPT

## 2019-03-06 PROCEDURE — A9270 NON-COVERED ITEM OR SERVICE: HCPCS | Performed by: FAMILY MEDICINE

## 2019-03-06 PROCEDURE — 93005 ELECTROCARDIOGRAM TRACING: CPT | Performed by: EMERGENCY MEDICINE

## 2019-03-06 PROCEDURE — 80053 COMPREHEN METABOLIC PANEL: CPT | Mod: 91

## 2019-03-06 PROCEDURE — 700111 HCHG RX REV CODE 636 W/ 250 OVERRIDE (IP): Performed by: FAMILY MEDICINE

## 2019-03-06 RX ORDER — METFORMIN HYDROCHLORIDE 500 MG/1
500 TABLET, EXTENDED RELEASE ORAL DAILY
COMMUNITY
End: 2022-07-21

## 2019-03-06 RX ORDER — PROMETHAZINE HYDROCHLORIDE 25 MG/1
12.5-25 SUPPOSITORY RECTAL EVERY 4 HOURS PRN
Status: DISCONTINUED | OUTPATIENT
Start: 2019-03-06 | End: 2019-03-08 | Stop reason: HOSPADM

## 2019-03-06 RX ORDER — LABETALOL HYDROCHLORIDE 5 MG/ML
10 INJECTION, SOLUTION INTRAVENOUS EVERY 4 HOURS PRN
Status: DISCONTINUED | OUTPATIENT
Start: 2019-03-06 | End: 2019-03-08 | Stop reason: HOSPADM

## 2019-03-06 RX ORDER — ONDANSETRON 4 MG/1
4 TABLET, ORALLY DISINTEGRATING ORAL EVERY 4 HOURS PRN
Status: DISCONTINUED | OUTPATIENT
Start: 2019-03-06 | End: 2019-03-08 | Stop reason: HOSPADM

## 2019-03-06 RX ORDER — ASPIRIN 325 MG
325 TABLET ORAL DAILY
Status: DISCONTINUED | OUTPATIENT
Start: 2019-03-06 | End: 2019-03-08 | Stop reason: HOSPADM

## 2019-03-06 RX ORDER — ONDANSETRON 2 MG/ML
4 INJECTION INTRAMUSCULAR; INTRAVENOUS EVERY 4 HOURS PRN
Status: DISCONTINUED | OUTPATIENT
Start: 2019-03-06 | End: 2019-03-08 | Stop reason: HOSPADM

## 2019-03-06 RX ORDER — PROMETHAZINE HYDROCHLORIDE 25 MG/1
12.5-25 TABLET ORAL EVERY 4 HOURS PRN
Status: DISCONTINUED | OUTPATIENT
Start: 2019-03-06 | End: 2019-03-08 | Stop reason: HOSPADM

## 2019-03-06 RX ORDER — POLYETHYLENE GLYCOL 3350 17 G/17G
1 POWDER, FOR SOLUTION ORAL
Status: DISCONTINUED | OUTPATIENT
Start: 2019-03-06 | End: 2019-03-08 | Stop reason: HOSPADM

## 2019-03-06 RX ORDER — THIAMINE MONONITRATE (VIT B1) 100 MG
100 TABLET ORAL DAILY
Status: DISCONTINUED | OUTPATIENT
Start: 2019-03-07 | End: 2019-03-08 | Stop reason: HOSPADM

## 2019-03-06 RX ORDER — ZIPRASIDONE HYDROCHLORIDE 60 MG/1
60 CAPSULE ORAL EVERY EVENING
COMMUNITY

## 2019-03-06 RX ORDER — AMOXICILLIN 250 MG
2 CAPSULE ORAL 2 TIMES DAILY
Status: DISCONTINUED | OUTPATIENT
Start: 2019-03-06 | End: 2019-03-08 | Stop reason: HOSPADM

## 2019-03-06 RX ORDER — LORAZEPAM 2 MG/ML
1 INJECTION INTRAMUSCULAR ONCE
Status: COMPLETED | OUTPATIENT
Start: 2019-03-06 | End: 2019-03-06

## 2019-03-06 RX ORDER — ASPIRIN 300 MG/1
300 SUPPOSITORY RECTAL DAILY
Status: DISCONTINUED | OUTPATIENT
Start: 2019-03-06 | End: 2019-03-08 | Stop reason: HOSPADM

## 2019-03-06 RX ORDER — LEVETIRACETAM 250 MG/1
250 TABLET ORAL 2 TIMES DAILY
Status: ON HOLD | COMMUNITY
End: 2019-03-08

## 2019-03-06 RX ORDER — ATORVASTATIN CALCIUM 80 MG/1
80 TABLET, FILM COATED ORAL EVERY EVENING
Status: DISCONTINUED | OUTPATIENT
Start: 2019-03-06 | End: 2019-03-08 | Stop reason: HOSPADM

## 2019-03-06 RX ORDER — ASPIRIN 81 MG/1
324 TABLET, CHEWABLE ORAL DAILY
Status: DISCONTINUED | OUTPATIENT
Start: 2019-03-06 | End: 2019-03-08 | Stop reason: HOSPADM

## 2019-03-06 RX ORDER — BISACODYL 10 MG
10 SUPPOSITORY, RECTAL RECTAL
Status: DISCONTINUED | OUTPATIENT
Start: 2019-03-06 | End: 2019-03-08 | Stop reason: HOSPADM

## 2019-03-06 RX ORDER — ZIPRASIDONE HYDROCHLORIDE 60 MG/1
60 CAPSULE ORAL EVERY EVENING
Status: DISCONTINUED | OUTPATIENT
Start: 2019-03-07 | End: 2019-03-07

## 2019-03-06 RX ORDER — HYDRALAZINE HYDROCHLORIDE 20 MG/ML
10 INJECTION INTRAMUSCULAR; INTRAVENOUS
Status: DISCONTINUED | OUTPATIENT
Start: 2019-03-06 | End: 2019-03-08 | Stop reason: HOSPADM

## 2019-03-06 RX ORDER — LORAZEPAM 2 MG/ML
4 INJECTION INTRAMUSCULAR
Status: DISCONTINUED | OUTPATIENT
Start: 2019-03-06 | End: 2019-03-08 | Stop reason: HOSPADM

## 2019-03-06 RX ADMIN — ATORVASTATIN CALCIUM 80 MG: 80 TABLET, FILM COATED ORAL at 21:27

## 2019-03-06 RX ADMIN — ENOXAPARIN SODIUM 40 MG: 100 INJECTION SUBCUTANEOUS at 21:27

## 2019-03-06 RX ADMIN — SODIUM CHLORIDE 3000 MG: 9 INJECTION, SOLUTION INTRAVENOUS at 16:34

## 2019-03-06 RX ADMIN — IOHEXOL 100 ML: 350 INJECTION, SOLUTION INTRAVENOUS at 15:00

## 2019-03-06 RX ADMIN — ASPIRIN 81 MG 324 MG: 81 TABLET ORAL at 19:35

## 2019-03-06 RX ADMIN — IOHEXOL 40 ML: 350 INJECTION, SOLUTION INTRAVENOUS at 15:00

## 2019-03-06 RX ADMIN — LORAZEPAM 1 MG: 2 INJECTION INTRAMUSCULAR; INTRAVENOUS at 15:26

## 2019-03-06 NOTE — ED TRIAGE NOTES
Chief Complaint   Patient presents with   • Dizziness   • Weakness   to triage via wheelchair, BIB EMS from group home. Per report, pt more lethargic than normal. No neuro deficits, equal , pt able to hold up arms and legs, no facial droop.  FSBS 118     /86   Pulse 94   Temp 36.6 °C (97.9 °F) (Temporal)   Resp 18   Ht 1.524 m (5')   Wt 90.7 kg (200 lb)   SpO2 98%   BMI 39.06 kg/m²     Pt Informed regarding triage process and verbalized understanding to inform triage tech or RN for any changes in condition.  Placed in lobby.

## 2019-03-06 NOTE — ED PROVIDER NOTES
ED Provider Note    Scribed for Fabricio Reynoso M.D. by Augustine Streeter. 3/6/2019  2:31 PM    Primary care provider: Ulisses Fierro M.D.  Means of arrival: Ambulance  History obtained from: Patient's sister and the patient.  History limited by: None    CHIEF COMPLAINT  Chief Complaint   Patient presents with   • Dizziness   • Weakness     HPI  Erin Santiago is a 38 y.o. female with Stage III Parkinson's who presents to the Emergency Department with dizziness and weakness.  The patient was noticed to be working at a significantly slower pace today at work and noticed a droop in the right eye that has now resolved.  There is associated left sided weakness Patient had a ground level fall and had slurred speech approximately around 10:45 and the sister was called at 11:09 AM.  They are unsure of the mechanics of the fall.  The friend reports that the patient has been acting abnormally, not being able to focus or hold a conversation.  The patient denies any complete loss of function of her extremities, fever, rhinorrhea, cough, congestion.  There is a history of partial seizures.  Last May, the patient had an episode of similar symptoms that resolved on its own.  She was evaluated by neurology, Dr. Bustamante, at that time who discovered the stage III parkinson's disease.      Caretakers report no allergies to IV contrast.  History was obtained from the sister who is her part time caretaker.  She reports the patient her speech is slower than normal.      REVIEW OF SYSTEMS  Pertinent positives include dizziness, weakness, ground level fall, slurred speech, and left sided weakness. Pertinent negatives include no complete loss of function of her extremities, fever, rhinorrhea, cough, congestion.  All other systems reviewed and negative.    PAST MEDICAL HISTORY   has a past medical history of Anxiety; Depression; Diabetes; Epilepsy (HCC); Mental disability; Sleep apnea; Snoring; and Unspecified urinary  incontinence.    SURGICAL HISTORY   has a past surgical history that includes lesion excision general (2/20/2014).    SOCIAL HISTORY  Social History   Substance Use Topics   • Smoking status: Former Smoker     Types: Cigarettes   • Smokeless tobacco: Never Used   • Alcohol use No      History   Drug Use No     FAMILY HISTORY  Mother and father have Parkinson's disease.    CURRENT MEDICATIONS  Home Medications     Reviewed by Poli Stewart (Pharmacy Tech) on 03/06/19 at 1615  Med List Status: Complete   Medication Last Dose Status   carbidopa-levodopa (SINEMET)  MG Tab 3/6/2019 Active   levETIRAcetam (KEPPRA) 250 MG tablet 3/6/2019 Active   metFORMIN ER (GLUCOPHAGE XR) 500 MG TABLET SR 24 HR 3/6/2019 Active   multivitamin (THERAGRAN) Tab 3/6/2019 Active   thiamine (THIAMINE) 100 MG Tab 3/6/2019 Active   ziprasidone (GEODON) 60 MG Cap 3/5/2019 Active              ALLERGIES  Allergies   Allergen Reactions   • Acetaminophen      Unknown per patient and caregiver   • Other Misc      bees   • Penicillins Hives     Per patien   • Shellfish Allergy Hives     Get hives per patient     PHYSICAL EXAM  VITAL SIGNS: /86   Pulse 88   Temp 36.6 °C (97.9 °F) (Temporal)   Resp 20   Ht 1.524 m (5')   Wt 90.7 kg (200 lb)   SpO2 95%   BMI 39.06 kg/m²     Constitutional: Well developed, Well nourished, no distress, Non-toxic appearance.   HENT: Normocephalic, Atraumatic, Bilateral external ears normal, Oropharynx moist, No oral exudates.   Eyes: PERRLA, EOMI, Conjunctiva normal, No discharge.   Neck: No tenderness, Supple, No stridor.   Lymphatic: No lymphadenopathy noted.   Cardiovascular: Normal heart rate, Normal rhythm.   Thorax & Lungs: Clear to auscultation bilaterally, No respiratory distress, No wheezing, No crackles.   Abdomen: Soft, No tenderness, No masses, No pulsatile masses.   Skin: Warm, Dry, No erythema, No rash.   Extremities:, No edema No cyanosis.   Musculoskeletal: No tenderness to palpation or  major deformities noted.  Intact distal pulses  Neurologic: Awake, alert. Moves all extremities spontaneously.  Awake, alert.  Appropriate finger to nose test.  NIH of 3. Right sided facial droop, left sided drift.  Slow to respond, slow speech, no gross dysarthria.  Psychiatric: Affect normal, Judgment normal, Mood normal.     LABS  Labs Reviewed   CBC WITH DIFFERENTIAL - Abnormal; Notable for the following:        Result Value    Hematocrit 49.6 (*)     .2 (*)     MCHC 30.6 (*)     Baso (Absolute) 0.13 (*)     All other components within normal limits   APTT - Abnormal; Notable for the following:     APTT 23.7 (*)     All other components within normal limits    Narrative:     Indicate which anticoagulants the patient is on:->UNKNOWN   HCG QUAL SERUM   COMP METABOLIC PANEL   TSH   FREE THYROXINE   PROTHROMBIN TIME    Narrative:     Indicate which anticoagulants the patient is on:->UNKNOWN   COD (ADULT)   TROPONIN   ESTIMATED GFR   CREATINE KINASE   URINALYSIS,CULTURE IF INDICATED   ABO AND RH CONFIRMATION   HEMOGLOBIN A1C     All labs reviewed by me.    EKG  Results for orders placed or performed during the hospital encounter of 19   EKG (NOW)   Result Value Ref Range    Report       [facility] Emergency Dept.    Test Date:  2019  Pt Name:    PHU RICHARDS                  Department: ER  MRN:        [id number]                      Room:       RD 10  Gender:     Female                       Technician: [id number]  :        1980                   Requested By:ER TRIAGE PROTOCOL  Order #:    [id number]                    Reading MD: ROSA WALLS MD    Measurements  Intervals                                Axis  Rate:       82                           P:          45  IL:         168                          QRS:        29  QRSD:       92                           T:          18  QT:         364  QTc:        425    Interpretive Statements  SINUS RHYTHM  Compared to ECG  01/29/2014 15:51:29  No significant changes    Electronically Signed On 3-6-2019 21:34:58 PST by ROSA WALLS MD          RADIOLOGY  CT-CTA NECK WITH & W/O-POST PROCESSING   Final Result      CT angiogram of the neck within normal limits.      DX-CHEST-PORTABLE (1 VIEW)   Final Result         1. No acute cardiopulmonary abnormalities are identified.      CT-CTA HEAD WITH & W/O-POST PROCESS   Final Result         1. No hemodynamically significant narrowing of the major intracranial vessels.      CT-CEREBRAL PERFUSION ANALYSIS   Final Result      1.  Cerebral blood flow less than 30% likely representing completed infarct = 0 mL.      2.  T Max more than 6 seconds likely representing combination of completed infarct and ischemia = 0 mL.      3.  Mismatched volume likely representing ischemic brain/penumbra = None      4.  Please note that the cerebral perfusion was performed on the limited brain tissue around the basal ganglia region. Infarct/ischemia outside the CT perfusion sections can be missed in this study.      CT-HEAD W/O   Final Result      1.  White matter lucencies most consistent with small vessel ischemic change versus demyelination or gliosis.      2.  Otherwise, Head CT without contrast with no acute findings. No evidence of acute cerebral hemorrhage or mass lesion.      MR-BRAIN-W/O    (Results Pending)   EC-ECHOCARDIOGRAM COMPLETE W/O CONT    (Results Pending)     The radiologist's interpretation of all radiological studies have been reviewed by me.    COURSE & MEDICAL DECISION MAKING  Pertinent Labs & Imaging studies reviewed. (See chart for details)    I reviewed the patient's medical records which showed the patient has a history of a mental disability.    2:31 PM - Patient seen and examined at bedside. Ordered EKG, Urinalysis culture if indicated, CMP, CBC with differential, HCG Qual serum, TSH, and Free Thyroxine to evaluate her symptoms. The differential diagnoses include but are not  limited to: TIA, CVA, metabolic, and infectious.  I discussed the potential for thrombolytic treatment within the first four hours of a stroke as well as the associated risk of brain bleeding.      2:44 PM Paged for stroke protocol.  Ordered CT Head, CT-CTA head, Ct-CTA neck, CT-cerebral perfusion analysis, Prothrombin time, APTT, and COD.    4:02 PM I discussed the patient's case and the above findings with Dr. Bonds (Hospitalist) who agrees to admit the patient to the hospital for a neurology workup.     Decision Making:  Patient is coming in with acute onset right-sided facial droop and left arm and leg drift, ordered the stroke protocol, CT scan and CT perfusion scan are all negative, the patient has not an alteplase candidate secondary to the patient is outside of the window by the time the CT scan had been done.  Neurology came and evaluated the patient recommended seizure workup.  Discussed the case with the hospitalist for admission in the hospital.    DISPOSITION:  Patient will be admitted to Dr. Bonds in guarded condition.     FINAL IMPRESSION  1. Confusion    2. Weakness          Augustine BOSWELL (Scribe), am scribing for, and in the presence of, Fabricio Reynoso M.D..    Electronically signed by: Augustine Streeter (Scribe), 3/6/2019    Fabricio BOSWELL M.D. personally performed the services described in this documentation, as scribed by Augustine Streeter in my presence, and it is both accurate and complete.  C.    The note accurately reflects work and decisions made by me.  Fabricio Reynoso  3/6/2019  9:35 PM

## 2019-03-06 NOTE — CONSULTS
"Chief Complaint   Patient presents with   • Dizziness   • Weakness       Problem List Items Addressed This Visit     None      Stroke Alert Consult    History of present illness:  This is a 38-year old female with PMhx significant for intellectual disorder/MRDD, epilepsy (reportedly on Keppra and Lamictal, though seizure history is unclear at this time), type II diabetes mellitus, and anxiety/depression/behavioral issues who presented to [facility] on 3/6/19 for a chief complaint of Left sided weakness and falls at her day care facility. HPI provided by family at bedside. Family reports that they received a phone call from a caregiver at 1109 this morning, reporting that patient was \"stubbling\" and had fallen, after which time she was noted to have ?right facial weakness and Left UE/LE weakness. Also some concern for slurred speech. Time of onset/LKW however remains unclear, as symptoms likely started at some point prior to phone call. At any rate, at time of presentation here CT Brain revealed no acute intracranial abnormality; CTA without LVO. Patient ultimately determined not to be a candidate for IV tPA secondary to presentation time greater than 4.5 hours from presumed time of symptom onset; also in setting of unclear LKW.    Currently, patient is sitting up in bed; awake and alert. When asked if she had a seizure, she nods \"yes.\" Further ROS is limited given patient's baseline altered mentation.     Neurology has been consulted by Dr. Fabricio Reynoso to further evaluate the findings noted above.     Past medical history:   Past Medical History:   Diagnosis Date   • Anxiety    • Depression     depression    • Diabetes     dx 2013 blood sugars not checked at home   • Epilepsy (HCC)     seizure free   • Mental disability    • Sleep apnea     no cpap medicaid took it away due to non compliance   • Snoring    • Unspecified urinary incontinence     behavioral       Past surgical history:   Past Surgical " History:   Procedure Laterality Date   • LESION EXCISION GENERAL  2/20/2014    Performed by Lemuel Fernandez M.D. at [facility]       Family history:   No family history on file.    Social history:   Social History     Social History   • Marital status: Single     Spouse name: N/A   • Number of children: N/A   • Years of education: N/A     Occupational History   • Not on file.     Social History Main Topics   • Smoking status: Former Smoker     Types: Cigarettes   • Smokeless tobacco: Never Used   • Alcohol use No   • Drug use: No   • Sexual activity: No     Other Topics Concern   • Not on file     Social History Narrative   • No narrative on file       Current medications:   Current Facility-Administered Medications   Medication Dose   • levETIRAcetam (KEPPRA) 3,000 mg in  mL IVPB  3,000 mg     Current Outpatient Prescriptions   Medication   • carbidopa-levodopa (SINEMET)  MG Tab   • thiamine (THIAMINE) 100 MG Tab   • NON SPECIFIED   • lamotrigine (LAMICTAL) 25 MG Tab   • metformin (GLUCOPHAGE) 500 MG TABS   • fluticasone (FLONASE) 50 MCG/ACT nasal spray   • LEVETIRACETAM PO       Medication Allergy:  Allergies   Allergen Reactions   • Acetaminophen      Unknown per patient and caregiver   • Other Misc      bees   • Penicillins Hives     Per patien   • Shellfish Allergy Hives     Get hives per patient       Review of systems:   As noted above; otherwise limited secondary to patient's baseline AMS.     Physical examination:   Vitals:    03/06/19 1152 03/06/19 1157 03/06/19 1341 03/06/19 1511   BP: 126/86      Pulse: 94  88 95   Resp: 18  20 20   Temp: 36.6 °C (97.9 °F)      TempSrc: Temporal      SpO2: 98%  95% 98%   Weight:  90.7 kg (200 lb)     Height:  1.524 m (5')       General: Patient in no acute distress.  HEENT: Normocephalic, no signs of acute trauma.   Neck: supple, no meningeal signs or carotid bruits. There is normal range of motion. No tenderness on exam.   Chest: clear  to auscultation. No cough.   CV: RRR, no murmurs.   Skin: no signs of acute rashes or trauma.   Musculoskeletal: joints exhibit full range of motion, without any pain to palpation. There are no signs of joint or muscle swelling. There is no tenderness to deep palpation of muscles.   Psychiatric: No hallucinatory behavior. Denies symptoms of depression or suicidal ideation. Mood and affect appear normal on exam.     NEUROLOGICAL EXAM:  Mental status, orientation: Awake, alert and fully oriented.   Speech and language: Minimal verbal output, baseline. Follows simple commands bilaterally.  Memory: There is intact recollection of recent and remote events.   Cranial nerve exam: Pupils are 3-4 mm bilaterally and equally reactive to light and accommodation. Visual fields are intact by confrontation. There is no nystagmus on primary or secondary gaze. Intact full EOM in all directions of gaze. Face appears symmetric. Sensation in the face is intact to light touch. Tongue is midline and without any signs of tongue biting or fasciculations. Sternocleidomastoid muscles exhibit is normal strength bilaterally. Shoulder shrug is intact bilaterally.   Motor exam: Strength is 5/5 RUE/RLE, LLE. 4/5 to LUE with slight drift. Tone is normal. No abnormal movements were seen on exam.   Sensory exam reveals normal sense of light touch, proprioception, vibration and pinprick in all extremities.   Deep tendon reflexes:  2+ throughout. Plantar responses are flexor. There is no clonus.   Coordination: shows a normal finger-nose-finger. Normal rapidly alternating movements.   Gait: Not assessed at this time as patient is a fall risk.     NIH Stroke Scale    1a Level of Consciousness   1b Orientation Questions   1c Response to Commands   2 Gaze   3 Visual Fields   4 Facial Movement   5 Motor Function (arm)   a Left 1  b Right   6 Motor Function (leg)   a Left   b Right   7 Limb Ataxia   8 Sensory   9 Language   10 Articulation   11  Extinction/Inattention     Score: 1    ANCILLARY DATA REVIEWED:     Lab Data Review:  Recent Results (from the past 24 hour(s))   EKG (NOW)    Collection Time: 19  1:45 PM   Result Value Ref Range    Report       [facility] Emergency Dept.    Test Date:  2019  Pt Name:    PHU RICHARDS                  Department: ER  MRN:        [id number]                      Room:       [facility]  Gender:     Female                       Technician: [id number]  :        1980                   Requested By:ER TRIAGE PROTOCOL  Order #:    [id number]                    Reading MD:    Measurements  Intervals                                Axis  Rate:       82                           P:          45  CT:         168                          QRS:        29  QRSD:       92                           T:          18  QT:         364  QTc:        425    Interpretive Statements  SINUS RHYTHM  Compared to ECG 2014 15:51:29  No significant changes     HCG Qual Serum    Collection Time: 19  2:52 PM   Result Value Ref Range    Beta-Hcg Qualitative Serum Negative Negative   CBC WITH DIFFERENTIAL    Collection Time: 19  2:52 PM   Result Value Ref Range    WBC 10.2 4.8 - 10.8 K/uL    RBC 4.95 4.20 - 5.40 M/uL    Hemoglobin 15.2 12.0 - 16.0 g/dL    Hematocrit 49.6 (H) 37.0 - 47.0 %    .2 (H) 81.4 - 97.8 fL    MCH 30.7 27.0 - 33.0 pg    MCHC 30.6 (L) 33.6 - 35.0 g/dL    RDW 48.5 35.9 - 50.0 fL    Platelet Count 295 164 - 446 K/uL    MPV 9.7 9.0 - 12.9 fL    Neutrophils-Polys 60.70 44.00 - 72.00 %    Lymphocytes 30.60 22.00 - 41.00 %    Monocytes 6.60 0.00 - 13.40 %    Eosinophils 0.60 0.00 - 6.90 %    Basophils 1.30 0.00 - 1.80 %    Immature Granulocytes 0.20 0.00 - 0.90 %    Nucleated RBC 0.00 /100 WBC    Neutrophils (Absolute) 6.21 2.00 - 7.15 K/uL    Lymphs (Absolute) 3.12 1.00 - 4.80 K/uL    Monos (Absolute) 0.67 0.00 - 0.85 K/uL    Eos (Absolute) 0.06 0.00 - 0.51 K/uL    Baso (Absolute) 0.13  (H) 0.00 - 0.12 K/uL    Immature Granulocytes (abs) 0.02 0.00 - 0.11 K/uL    NRBC (Absolute) 0.00 K/uL   COMP METABOLIC PANEL    Collection Time: 03/06/19  2:52 PM   Result Value Ref Range    Sodium 140 135 - 145 mmol/L    Potassium 4.5 3.6 - 5.5 mmol/L    Chloride 106 96 - 112 mmol/L    Co2 26 20 - 33 mmol/L    Anion Gap 8.0 0.0 - 11.9    Glucose 81 65 - 99 mg/dL    Bun 11 8 - 22 mg/dL    Creatinine 0.74 0.50 - 1.40 mg/dL    Calcium 9.9 8.5 - 10.5 mg/dL    AST(SGOT) 12 12 - 45 U/L    ALT(SGPT) 8 2 - 50 U/L    Alkaline Phosphatase 58 30 - 99 U/L    Total Bilirubin 0.8 0.1 - 1.5 mg/dL    Albumin 4.5 3.2 - 4.9 g/dL    Total Protein 7.2 6.0 - 8.2 g/dL    Globulin 2.7 1.9 - 3.5 g/dL    A-G Ratio 1.7 g/dL   PROTHROMBIN TIME    Collection Time: 03/06/19  2:52 PM   Result Value Ref Range    PT 14.2 12.0 - 14.6 sec    INR 1.09 0.87 - 1.13   APTT    Collection Time: 03/06/19  2:52 PM   Result Value Ref Range    APTT 23.7 (L) 24.7 - 36.0 sec   COD (ADULT)    Collection Time: 03/06/19  2:52 PM   Result Value Ref Range    ABO Grouping Only A     Rh Grouping Only POS    TROPONIN    Collection Time: 03/06/19  2:52 PM   Result Value Ref Range    Troponin I <0.01 0.00 - 0.04 ng/mL   ESTIMATED GFR    Collection Time: 03/06/19  2:52 PM   Result Value Ref Range    GFR If African American >60 >60 mL/min/1.73 m 2    GFR If Non African American >60 >60 mL/min/1.73 m 2       Labs reviewed by me.     Records reviewed:   Reviewed records from patient's previous neurology encounters.     Imaging reviewed by me:     DX-CHEST-PORTABLE (1 VIEW)   Final Result         1. No acute cardiopulmonary abnormalities are identified.      CT-CTA HEAD WITH & W/O-POST PROCESS   Final Result         1. No hemodynamically significant narrowing of the major intracranial vessels.      CT-CEREBRAL PERFUSION ANALYSIS   Final Result      1.  Cerebral blood flow less than 30% likely representing completed infarct = 0 mL.      2.  T Max more than 6 seconds likely  representing combination of completed infarct and ischemia = 0 mL.      3.  Mismatched volume likely representing ischemic brain/penumbra = None      4.  Please note that the cerebral perfusion was performed on the limited brain tissue around the basal ganglia region. Infarct/ischemia outside the CT perfusion sections can be missed in this study.      CT-HEAD W/O   Final Result      1.  White matter lucencies most consistent with small vessel ischemic change versus demyelination or gliosis.      2.  Otherwise, Head CT without contrast with no acute findings. No evidence of acute cerebral hemorrhage or mass lesion.      CT-CTA NECK WITH & W/O-POST PROCESSING    (Results Pending)         Presumed mechanism by TOAST:  __Large Artery Atherosclerosis  __Small Vessel (Lacunar)  __Cardioembolic  __Other (Sickle Cell, Vasculitis, Hypercoagulable)  _X_Unknown      ASSESSMENT AND PLAN:  38-year old female with PMhx significant for intellectual disorder/MRDD, epilepsy (reportedly on Keppra and Lamictal, though seizure history is unclear at this time), type II diabetes mellitus, and anxiety/depression/behavioral issues who presented to [facility] on 3/6/19 for a chief complaint of Left sided weakness and falls at her day care facility; CT brain, CTA Brain/neck unremarkable; patient however determined not to be a candidate for IV tPA secondary to presentation time greater than 4.5 hours from presumed time of symptom onset. NIHSS currently 1. Differential diagnoses at this time include ischemic event/transient ischemic attack vs. Epileptogenic event/seizure; will given keppra load, Ativan and plan for MRI Brain; consider EEG in am.     Recommendations/Plan:     -q4h and PRN neuro assessment; VS per nursing/unit protocol.    -MRI Brain wo contrast ordered, pending.   -Telemetry; currently SR. Screen for arrhythmia. Obtain TTE with bubble study.   -Given Ativan 1 mg IV now, Keppra 3000 mg IVPB now. Seizure precautions.   Consider EEG in the am.   -Check lipid panel, A1c, TSH.   -Given  mg PO now and then daily.   -Atorvastatin 80 mg PO q HS.   -PT/OT/SLP eval and treat.   -Counseled patient and and family at length regarding life style and risk factor modification for primary stroke prevention.   -All other medical management per primary team.     Will follow up with results of the above and make additional recommendations accordingly. The plan of care above has been discussed with Dr. Powell.      Apple Lynn MSN, BSN, AGNP-C  [facility]

## 2019-03-07 ENCOUNTER — APPOINTMENT (OUTPATIENT)
Dept: RADIOLOGY | Facility: MEDICAL CENTER | Age: 39
End: 2019-03-07
Attending: FAMILY MEDICINE
Payer: MEDICARE

## 2019-03-07 LAB
ABO GROUP BLD: NORMAL
ALBUMIN SERPL BCP-MCNC: 4 G/DL (ref 3.2–4.9)
ALBUMIN/GLOB SERPL: 1.7 G/DL
ALP SERPL-CCNC: 56 U/L (ref 30–99)
ALT SERPL-CCNC: 10 U/L (ref 2–50)
ANION GAP SERPL CALC-SCNC: 12 MMOL/L (ref 0–11.9)
AST SERPL-CCNC: 15 U/L (ref 12–45)
BASOPHILS # BLD AUTO: 0.5 % (ref 0–1.8)
BASOPHILS # BLD: 0.06 K/UL (ref 0–0.12)
BILIRUB SERPL-MCNC: 1.1 MG/DL (ref 0.1–1.5)
BUN SERPL-MCNC: 11 MG/DL (ref 8–22)
CALCIUM SERPL-MCNC: 9.6 MG/DL (ref 8.5–10.5)
CHLORIDE SERPL-SCNC: 106 MMOL/L (ref 96–112)
CHOLEST SERPL-MCNC: 105 MG/DL (ref 100–199)
CO2 SERPL-SCNC: 23 MMOL/L (ref 20–33)
CREAT SERPL-MCNC: 0.83 MG/DL (ref 0.5–1.4)
EOSINOPHIL # BLD AUTO: 0.08 K/UL (ref 0–0.51)
EOSINOPHIL NFR BLD: 0.7 % (ref 0–6.9)
ERYTHROCYTE [DISTWIDTH] IN BLOOD BY AUTOMATED COUNT: 44.3 FL (ref 35.9–50)
EST. AVERAGE GLUCOSE BLD GHB EST-MCNC: 108 MG/DL
GLOBULIN SER CALC-MCNC: 2.3 G/DL (ref 1.9–3.5)
GLUCOSE SERPL-MCNC: 84 MG/DL (ref 65–99)
HBA1C MFR BLD: 5.4 % (ref 0–5.6)
HCT VFR BLD AUTO: 44.6 % (ref 37–47)
HDLC SERPL-MCNC: 57 MG/DL
HGB BLD-MCNC: 14.8 G/DL (ref 12–16)
IMM GRANULOCYTES # BLD AUTO: 0.02 K/UL (ref 0–0.11)
IMM GRANULOCYTES NFR BLD AUTO: 0.2 % (ref 0–0.9)
LDLC SERPL CALC-MCNC: 39 MG/DL
LYMPHOCYTES # BLD AUTO: 3.63 K/UL (ref 1–4.8)
LYMPHOCYTES NFR BLD: 30.8 % (ref 22–41)
MCH RBC QN AUTO: 30.9 PG (ref 27–33)
MCHC RBC AUTO-ENTMCNC: 33.2 G/DL (ref 33.6–35)
MCV RBC AUTO: 93.1 FL (ref 81.4–97.8)
MONOCYTES # BLD AUTO: 0.79 K/UL (ref 0–0.85)
MONOCYTES NFR BLD AUTO: 6.7 % (ref 0–13.4)
NEUTROPHILS # BLD AUTO: 7.2 K/UL (ref 2–7.15)
NEUTROPHILS NFR BLD: 61.1 % (ref 44–72)
NRBC # BLD AUTO: 0 K/UL
NRBC BLD-RTO: 0 /100 WBC
PLATELET # BLD AUTO: 347 K/UL (ref 164–446)
PMV BLD AUTO: 9.6 FL (ref 9–12.9)
POTASSIUM SERPL-SCNC: 4.3 MMOL/L (ref 3.6–5.5)
PROT SERPL-MCNC: 6.3 G/DL (ref 6–8.2)
RBC # BLD AUTO: 4.79 M/UL (ref 4.2–5.4)
RH BLD: NORMAL
SODIUM SERPL-SCNC: 141 MMOL/L (ref 135–145)
TRIGL SERPL-MCNC: 45 MG/DL (ref 0–149)
WBC # BLD AUTO: 11.8 K/UL (ref 4.8–10.8)

## 2019-03-07 PROCEDURE — 99214 OFFICE O/P EST MOD 30 MIN: CPT | Mod: 25 | Performed by: PSYCHIATRY & NEUROLOGY

## 2019-03-07 PROCEDURE — 700111 HCHG RX REV CODE 636 W/ 250 OVERRIDE (IP): Performed by: PSYCHIATRY & NEUROLOGY

## 2019-03-07 PROCEDURE — 95951 EEG: CPT | Mod: 52 | Performed by: PSYCHIATRY & NEUROLOGY

## 2019-03-07 PROCEDURE — A9270 NON-COVERED ITEM OR SERVICE: HCPCS | Performed by: HOSPITALIST

## 2019-03-07 PROCEDURE — G0378 HOSPITAL OBSERVATION PER HR: HCPCS

## 2019-03-07 PROCEDURE — 96376 TX/PRO/DX INJ SAME DRUG ADON: CPT

## 2019-03-07 PROCEDURE — 96372 THER/PROPH/DIAG INJ SC/IM: CPT

## 2019-03-07 PROCEDURE — 700111 HCHG RX REV CODE 636 W/ 250 OVERRIDE (IP): Performed by: FAMILY MEDICINE

## 2019-03-07 PROCEDURE — 700102 HCHG RX REV CODE 250 W/ 637 OVERRIDE(OP): Performed by: FAMILY MEDICINE

## 2019-03-07 PROCEDURE — 700105 HCHG RX REV CODE 258: Performed by: HOSPITALIST

## 2019-03-07 PROCEDURE — 700105 HCHG RX REV CODE 258: Performed by: PSYCHIATRY & NEUROLOGY

## 2019-03-07 PROCEDURE — 99225 PR SUBSEQUENT OBSERVATION CARE,LEVEL II: CPT | Performed by: HOSPITALIST

## 2019-03-07 PROCEDURE — A9270 NON-COVERED ITEM OR SERVICE: HCPCS | Performed by: FAMILY MEDICINE

## 2019-03-07 PROCEDURE — 70551 MRI BRAIN STEM W/O DYE: CPT

## 2019-03-07 PROCEDURE — 302146: Performed by: FAMILY MEDICINE

## 2019-03-07 PROCEDURE — 95951 EEG: CPT | Mod: 26,52 | Performed by: PSYCHIATRY & NEUROLOGY

## 2019-03-07 PROCEDURE — 700102 HCHG RX REV CODE 250 W/ 637 OVERRIDE(OP): Performed by: HOSPITALIST

## 2019-03-07 RX ORDER — ZIPRASIDONE HYDROCHLORIDE 60 MG/1
60 CAPSULE ORAL EVERY EVENING
Status: DISCONTINUED | OUTPATIENT
Start: 2019-03-07 | End: 2019-03-08 | Stop reason: HOSPADM

## 2019-03-07 RX ORDER — LORAZEPAM 1 MG/1
1 TABLET ORAL ONCE
Status: COMPLETED | OUTPATIENT
Start: 2019-03-07 | End: 2019-03-07

## 2019-03-07 RX ORDER — SODIUM CHLORIDE 9 MG/ML
INJECTION, SOLUTION INTRAVENOUS CONTINUOUS
Status: DISCONTINUED | OUTPATIENT
Start: 2019-03-07 | End: 2019-03-08 | Stop reason: HOSPADM

## 2019-03-07 RX ADMIN — ATORVASTATIN CALCIUM 80 MG: 80 TABLET, FILM COATED ORAL at 17:29

## 2019-03-07 RX ADMIN — ZIPRASIDONE HCL 60 MG: 60 CAPSULE ORAL at 18:00

## 2019-03-07 RX ADMIN — SODIUM CHLORIDE 1000 MG: 9 INJECTION, SOLUTION INTRAVENOUS at 17:30

## 2019-03-07 RX ADMIN — ENOXAPARIN SODIUM 40 MG: 100 INJECTION SUBCUTANEOUS at 05:50

## 2019-03-07 RX ADMIN — ASPIRIN 325 MG: 325 TABLET ORAL at 05:50

## 2019-03-07 RX ADMIN — SODIUM CHLORIDE: 9 INJECTION, SOLUTION INTRAVENOUS at 23:56

## 2019-03-07 RX ADMIN — CARBIDOPA AND LEVODOPA 1 TABLET: 25; 100 TABLET ORAL at 17:29

## 2019-03-07 RX ADMIN — ZIPRASIDONE HCL 60 MG: 60 CAPSULE ORAL at 00:16

## 2019-03-07 RX ADMIN — LORAZEPAM 1 MG: 1 TABLET ORAL at 01:43

## 2019-03-07 RX ADMIN — SENNOSIDES AND DOCUSATE SODIUM 2 TABLET: 8.6; 5 TABLET ORAL at 05:50

## 2019-03-07 RX ADMIN — CARBIDOPA AND LEVODOPA 1 TABLET: 25; 100 TABLET ORAL at 12:00

## 2019-03-07 RX ADMIN — CARBIDOPA AND LEVODOPA 1 TABLET: 25; 100 TABLET ORAL at 05:49

## 2019-03-07 RX ADMIN — SODIUM CHLORIDE 1000 MG: 9 INJECTION, SOLUTION INTRAVENOUS at 05:50

## 2019-03-07 RX ADMIN — SODIUM CHLORIDE: 9 INJECTION, SOLUTION INTRAVENOUS at 03:10

## 2019-03-07 RX ADMIN — Medication 100 MG: at 05:49

## 2019-03-07 ASSESSMENT — ENCOUNTER SYMPTOMS: DIZZINESS: 1

## 2019-03-07 NOTE — DISCHARGE PLANNING
Aware of PMR referral from Dr. Bonds. Facial droop and dysarthria. Hx of seizure disorder on Keppra. Fall at work, unclear if seizure-like activity. MRI brain negative for acute infarct. Workup ongoing, therapy evals pending. No Physiatry consult ordered per protocol at this time. TCC following. Thank you for the referral.

## 2019-03-07 NOTE — THERAPY
Speech therapy contact note:     Orders received and verified for clinical swallow evaluation. Per bedside RN patient passed RN dysphagia screen and tolerated her regular diet with no overt concern. RN to clarify with MD and cancel current orders as medically appropriate

## 2019-03-07 NOTE — ED NOTES
Pt is c/o anxiety, does not normally talk anxiety medications at home.     No changes in neuro status.

## 2019-03-07 NOTE — ASSESSMENT & PLAN NOTE
Possible stroke as patient has left facial drooping and left-sided weakness.  Continue with aspirin and statin for now.  PT/OT/speech.  Permissive hypertension for 24 hours.  Echocardiogram pending read.  hemoglobin A1c 5.4  MRI of the brain negative.  Neurology following.

## 2019-03-07 NOTE — ED NOTES
Dr. Guzman will be this patient's attending physician starting at 0900.  Dr. Gill will answer pages between now and 0900, after 0900 please page Dr. Guzman

## 2019-03-07 NOTE — PROCEDURES
VIDEO ELECTROENCEPHALOGRAM REPORT      Referring provider: Dr. Bonds.     DOS: 3/7/2019 (total recording of 25 minutes).     INDICATION:  Erin Santiago 38 y.o. female presenting with seizure.     CURRENT ANTIEPILEPTIC REGIMEN: Levetiracetam, Lorazepam.     TECHNIQUE: 30 channel video electroencephalogram (EEG) was performed in accordance with the international 10-20 system. The study was reviewed in bipolar and referential montages. The recording examined the patient during wakeful and drowsy state(s).     DESCRIPTION OF THE RECORD:  During the wakefulness, the background showed a symmetrical 12 Hz alpha activity posteriorly with amplitude of 70 mV.  There was reactivity to eye closure/opening.  A normal anterior-posterior gradient was noted with faster beta frequencies seen anteriorly.  During drowsiness, theta frequencies were seen.    ACTIVATION PROCEDURES:   Intermittent Photic stimulation was performed in a stepwise fashion from 1 to 30 Hz and elicited a normal response (photic driving), most noticeable in the posterior leads.    ICTAL AND/OR INTERICTAL FINDINGS:   No focal or generalized epileptiform activity noted. No regional slowing was seen during this routine study.  No clinical events or seizures were reported or recorded during the study.     EKG: sampling of the EKG recording demonstrated sinus rhythm.     EVENTS:  None.     INTERPRETATION:  This is a normal video EEG recording in the awake and drowsy state(s).  Clinical correlation is recommended.    Note: A normal EEG does not rule out epilepsy.  If the clinical suspicion remains high for seizures, a prolonged recording to capture clinical or subclinical events may be helpful.        Joel Garcia MD   Epilepsy and Neurodiagnostics.   Clinical  of Neurology [facility].   Diplomate in Neurology, Epilepsy, and Electrodiagnostic Medicine.   Office: [phone]  Fax:  [phone]

## 2019-03-07 NOTE — ED NOTES
Med rec updated and complete.  Allergies reviewed per MARS from pts   Group home. No antibiotics noted.

## 2019-03-07 NOTE — PROGRESS NOTES
Report given and pt transfered to [facility] with all belongings. Pts sister informed of pts move to the unit.

## 2019-03-07 NOTE — ASSESSMENT & PLAN NOTE
Suspecting that patient had a seizure versus a stroke.  Patient has a history of seizure disorder.  Imaging studies including CT angiogram of the head and neck and CT scan of the head were negative for any acute abnormalities.  Was loaded with Keppra per neurology.  EEG negative  Seizure precautions.  PRN Ativan for breakthrough seizures.

## 2019-03-07 NOTE — ED NOTES
Report received from Jb OLIVERA, Verified that person that previous RN got information to fill out MRI screening is the guardian of said pt.

## 2019-03-07 NOTE — PROGRESS NOTES
"Chief Complaint   Patient presents with   • Dizziness   • Weakness       Problem List Items Addressed This Visit     None      Visit Diagnoses     Confusion        Weakness          Stroke Alert-- Progress Note    History of present illness:  This is a 38-year old female with PMhx significant for intellectual disorder/MRDD, epilepsy (reportedly on Keppra and Lamictal, though seizure history is unclear at this time), type II diabetes mellitus, and anxiety/depression/behavioral issues who presented to [facility] on 3/6/19 for a chief complaint of Left sided weakness and falls at her day care facility. HPI provided by family at bedside. Family reports that they received a phone call from a caregiver at 1109 this morning, reporting that patient was \"stubbling\" and had fallen, after which time she was noted to have ?right facial weakness and Left UE/LE weakness. Also some concern for slurred speech. Time of onset/LKW however remains unclear, as symptoms likely started at some point prior to phone call. At any rate, at time of presentation here CT Brain revealed no acute intracranial abnormality; CTA without LVO. Patient ultimately determined not to be a candidate for IV tPA secondary to presentation time greater than 4.5 hours from presumed time of symptom onset; also in setting of unclear LKW.    Currently, patient is sitting up in bed; awake and alert. When asked if she had a seizure, she nods \"yes.\" Further ROS is limited given patient's baseline altered mentation.     Neurology has been consulted by Dr. Fabricio Reynoso to further evaluate the findings noted above.     Interval, 3/7/19:   Patient is sitting up in bed; awake and alert. Eating lunch. States that she's feeling better, denies weakness or numbness, denies headache. No events overnight per nursing.     No changes to HPI as was documented on 3/6/19.     Past medical history:   Past Medical History:   Diagnosis Date   • Anxiety    • Depression     " depression    • Diabetes     dx 2013 blood sugars not checked at home   • Epilepsy (HCC)     seizure free   • Mental disability    • Sleep apnea     no cpap medicaid took it away due to non compliance   • Snoring    • Unspecified urinary incontinence     behavioral       Past surgical history:   Past Surgical History:   Procedure Laterality Date   • LESION EXCISION GENERAL  2/20/2014    Performed by Lemuel Fernandez M.D. at [facility]       Family history:   No family history on file.    Social history:   Social History     Social History   • Marital status: Single     Spouse name: N/A   • Number of children: N/A   • Years of education: N/A     Occupational History   • Not on file.     Social History Main Topics   • Smoking status: Former Smoker     Types: Cigarettes   • Smokeless tobacco: Never Used   • Alcohol use No   • Drug use: No   • Sexual activity: No     Other Topics Concern   • Not on file     Social History Narrative   • No narrative on file       Current medications:   Current Facility-Administered Medications   Medication Dose   • ziprasidone (GEODON) capsule 60 mg  60 mg   • NS infusion     • levETIRAcetam (KEPPRA) 1,000 mg in  mL IVPB  1,000 mg   • Pharmacy consult request - Allow for permissive hypertension: SBP up to 220 mmHg/DBP up to 120 mmHg x 48 hours     • labetalol (NORMODYNE,TRANDATE) injection 10 mg  10 mg    Or   • hydrALAZINE (APRESOLINE) injection 10 mg  10 mg   • atorvastatin (LIPITOR) tablet 80 mg  80 mg   • aspirin (ASA) tablet 325 mg  325 mg    Or   • aspirin (ASA) chewable tab 324 mg  324 mg    Or   • aspirin (ASA) suppository 300 mg  300 mg   • LORazepam (ATIVAN) injection 4 mg  4 mg   • senna-docusate (PERICOLACE or SENOKOT S) 8.6-50 MG per tablet 2 Tab  2 Tab    And   • polyethylene glycol/lytes (MIRALAX) PACKET 1 Packet  1 Packet    And   • magnesium hydroxide (MILK OF MAGNESIA) suspension 30 mL  30 mL    And   • bisacodyl (DULCOLAX) suppository 10 mg   10 mg   • enoxaparin (LOVENOX) inj 40 mg  40 mg   • ondansetron (ZOFRAN) syringe/vial injection 4 mg  4 mg   • ondansetron (ZOFRAN ODT) dispertab 4 mg  4 mg   • promethazine (PHENERGAN) tablet 12.5-25 mg  12.5-25 mg   • promethazine (PHENERGAN) suppository 12.5-25 mg  12.5-25 mg   • prochlorperazine (COMPAZINE) injection 5-10 mg  5-10 mg   • carbidopa-levodopa (SINEMET)  MG tablet 1 Tab  1 Tab   • thiamine tablet 100 mg  100 mg       Medication Allergy:  Allergies   Allergen Reactions   • Acetaminophen      Unknown per patient and caregiver   • Other Misc      bees   • Penicillins Hives     Per patien   • Shellfish Allergy Hives     Get hives per patient       Review of systems:   As noted above; otherwise limited secondary to patient's baseline AMS.     Physical examination:   Vitals:    03/07/19 0407 03/07/19 0500 03/07/19 0600 03/07/19 1200   BP:    116/80   Pulse:    81   Resp: 18 (!) 21 18 20   Temp:   37.1 °C (98.8 °F) 36.2 °C (97.2 °F)   TempSrc:   Temporal Temporal   SpO2: 93% 99% 91% 96%   Weight:       Height:         General: Patient in no acute distress.  HEENT: Normocephalic, no signs of acute trauma.   Neck: supple, no meningeal signs or carotid bruits. There is normal range of motion. No tenderness on exam.   Chest: clear to auscultation. No cough.   CV: RRR, no murmurs.   Skin: no signs of acute rashes or trauma.   Musculoskeletal: joints exhibit full range of motion, without any pain to palpation. There are no signs of joint or muscle swelling. There is no tenderness to deep palpation of muscles.   Psychiatric: No hallucinatory behavior. Denies symptoms of depression or suicidal ideation. Mood and affect appear normal on exam.     NEUROLOGICAL EXAM:  Mental status, orientation: Awake, alert and fully oriented.   Speech and language: Minimal verbal output, baseline. Follows simple commands bilaterally.  Memory: There is intact recollection of recent and remote events.   Cranial nerve exam:  Pupils are 3-4 mm bilaterally and equally reactive to light and accommodation. Visual fields are intact by confrontation. There is no nystagmus on primary or secondary gaze. Intact full EOM in all directions of gaze. Face appears symmetric. Sensation in the face is intact to light touch. Tongue is midline and without any signs of tongue biting or fasciculations. Sternocleidomastoid muscles exhibit is normal strength bilaterally. Shoulder shrug is intact bilaterally.   Motor exam: Strength is 5/5 RUE/RLE, LLE. 4/5 to LUE with slight drift. Tone is normal. No abnormal movements were seen on exam.   Sensory exam reveals normal sense of light touch, proprioception, vibration and pinprick in all extremities.   Deep tendon reflexes:  2+ throughout. Plantar responses are flexor. There is no clonus.   Coordination: shows a normal finger-nose-finger. Normal rapidly alternating movements.   Gait: Not assessed at this time as patient is a fall risk.     NIH Stroke Scale    1a Level of Consciousness   1b Orientation Questions   1c Response to Commands   2 Gaze   3 Visual Fields   4 Facial Movement   5 Motor Function (arm)   a Left   b Right   6 Motor Function (leg)   a Left   b Right   7 Limb Ataxia   8 Sensory   9 Language   10 Articulation   11 Extinction/Inattention     Score: 0    ANCILLARY DATA REVIEWED:     Lab Data Review:  Recent Results (from the past 24 hour(s))   HCG Qual Serum    Collection Time: 03/06/19  2:52 PM   Result Value Ref Range    Beta-Hcg Qualitative Serum Negative Negative   CBC WITH DIFFERENTIAL    Collection Time: 03/06/19  2:52 PM   Result Value Ref Range    WBC 10.2 4.8 - 10.8 K/uL    RBC 4.95 4.20 - 5.40 M/uL    Hemoglobin 15.2 12.0 - 16.0 g/dL    Hematocrit 49.6 (H) 37.0 - 47.0 %    .2 (H) 81.4 - 97.8 fL    MCH 30.7 27.0 - 33.0 pg    MCHC 30.6 (L) 33.6 - 35.0 g/dL    RDW 48.5 35.9 - 50.0 fL    Platelet Count 295 164 - 446 K/uL    MPV 9.7 9.0 - 12.9 fL    Neutrophils-Polys 60.70 44.00 -  72.00 %    Lymphocytes 30.60 22.00 - 41.00 %    Monocytes 6.60 0.00 - 13.40 %    Eosinophils 0.60 0.00 - 6.90 %    Basophils 1.30 0.00 - 1.80 %    Immature Granulocytes 0.20 0.00 - 0.90 %    Nucleated RBC 0.00 /100 WBC    Neutrophils (Absolute) 6.21 2.00 - 7.15 K/uL    Lymphs (Absolute) 3.12 1.00 - 4.80 K/uL    Monos (Absolute) 0.67 0.00 - 0.85 K/uL    Eos (Absolute) 0.06 0.00 - 0.51 K/uL    Baso (Absolute) 0.13 (H) 0.00 - 0.12 K/uL    Immature Granulocytes (abs) 0.02 0.00 - 0.11 K/uL    NRBC (Absolute) 0.00 K/uL   COMP METABOLIC PANEL    Collection Time: 03/06/19  2:52 PM   Result Value Ref Range    Sodium 140 135 - 145 mmol/L    Potassium 4.5 3.6 - 5.5 mmol/L    Chloride 106 96 - 112 mmol/L    Co2 26 20 - 33 mmol/L    Anion Gap 8.0 0.0 - 11.9    Glucose 81 65 - 99 mg/dL    Bun 11 8 - 22 mg/dL    Creatinine 0.74 0.50 - 1.40 mg/dL    Calcium 9.9 8.5 - 10.5 mg/dL    AST(SGOT) 12 12 - 45 U/L    ALT(SGPT) 8 2 - 50 U/L    Alkaline Phosphatase 58 30 - 99 U/L    Total Bilirubin 0.8 0.1 - 1.5 mg/dL    Albumin 4.5 3.2 - 4.9 g/dL    Total Protein 7.2 6.0 - 8.2 g/dL    Globulin 2.7 1.9 - 3.5 g/dL    A-G Ratio 1.7 g/dL   TSH    Collection Time: 03/06/19  2:52 PM   Result Value Ref Range    TSH 1.060 0.380 - 5.330 uIU/mL   FREE THYROXINE    Collection Time: 03/06/19  2:52 PM   Result Value Ref Range    Free T-4 1.19 0.53 - 1.43 ng/dL   PROTHROMBIN TIME    Collection Time: 03/06/19  2:52 PM   Result Value Ref Range    PT 14.2 12.0 - 14.6 sec    INR 1.09 0.87 - 1.13   APTT    Collection Time: 03/06/19  2:52 PM   Result Value Ref Range    APTT 23.7 (L) 24.7 - 36.0 sec   COD (ADULT)    Collection Time: 03/06/19  2:52 PM   Result Value Ref Range    ABO Grouping Only A     Rh Grouping Only POS     Antibody Screen-Cod NEG    TROPONIN    Collection Time: 03/06/19  2:52 PM   Result Value Ref Range    Troponin I <0.01 0.00 - 0.04 ng/mL   ESTIMATED GFR    Collection Time: 03/06/19  2:52 PM   Result Value Ref Range    GFR If   American >60 >60 mL/min/1.73 m 2    GFR If Non African American >60 >60 mL/min/1.73 m 2   Creatine Kinase (CPK)    Collection Time: 03/06/19  2:52 PM   Result Value Ref Range    CPK Total 56 0 - 154 U/L   ABO AND RH CONFIRMATION    Collection Time: 03/06/19 11:53 PM   Result Value Ref Range    ABO Confirm A     Second Rh Group POS    Lipid Profile    Collection Time: 03/06/19 11:54 PM   Result Value Ref Range    Cholesterol,Tot 105 100 - 199 mg/dL    Triglycerides 45 0 - 149 mg/dL    HDL 57 >=40 mg/dL    LDL 39 <100 mg/dL   CBC with Differential    Collection Time: 03/06/19 11:54 PM   Result Value Ref Range    WBC 11.8 (H) 4.8 - 10.8 K/uL    RBC 4.79 4.20 - 5.40 M/uL    Hemoglobin 14.8 12.0 - 16.0 g/dL    Hematocrit 44.6 37.0 - 47.0 %    MCV 93.1 81.4 - 97.8 fL    MCH 30.9 27.0 - 33.0 pg    MCHC 33.2 (L) 33.6 - 35.0 g/dL    RDW 44.3 35.9 - 50.0 fL    Platelet Count 347 164 - 446 K/uL    MPV 9.6 9.0 - 12.9 fL    Neutrophils-Polys 61.10 44.00 - 72.00 %    Lymphocytes 30.80 22.00 - 41.00 %    Monocytes 6.70 0.00 - 13.40 %    Eosinophils 0.70 0.00 - 6.90 %    Basophils 0.50 0.00 - 1.80 %    Immature Granulocytes 0.20 0.00 - 0.90 %    Nucleated RBC 0.00 /100 WBC    Neutrophils (Absolute) 7.20 (H) 2.00 - 7.15 K/uL    Lymphs (Absolute) 3.63 1.00 - 4.80 K/uL    Monos (Absolute) 0.79 0.00 - 0.85 K/uL    Eos (Absolute) 0.08 0.00 - 0.51 K/uL    Baso (Absolute) 0.06 0.00 - 0.12 K/uL    Immature Granulocytes (abs) 0.02 0.00 - 0.11 K/uL    NRBC (Absolute) 0.00 K/uL   Comp Metabolic Panel (CMP)    Collection Time: 03/06/19 11:54 PM   Result Value Ref Range    Sodium 141 135 - 145 mmol/L    Potassium 4.3 3.6 - 5.5 mmol/L    Chloride 106 96 - 112 mmol/L    Co2 23 20 - 33 mmol/L    Anion Gap 12.0 (H) 0.0 - 11.9    Glucose 84 65 - 99 mg/dL    Bun 11 8 - 22 mg/dL    Creatinine 0.83 0.50 - 1.40 mg/dL    Calcium 9.6 8.5 - 10.5 mg/dL    AST(SGOT) 15 12 - 45 U/L    ALT(SGPT) 10 2 - 50 U/L    Alkaline Phosphatase 56 30 - 99 U/L    Total  Bilirubin 1.1 0.1 - 1.5 mg/dL    Albumin 4.0 3.2 - 4.9 g/dL    Total Protein 6.3 6.0 - 8.2 g/dL    Globulin 2.3 1.9 - 3.5 g/dL    A-G Ratio 1.7 g/dL   HEMOGLOBIN A1C    Collection Time: 03/06/19 11:54 PM   Result Value Ref Range    Glycohemoglobin 5.4 0.0 - 5.6 %    Est Avg Glucose 108 mg/dL   ESTIMATED GFR    Collection Time: 03/06/19 11:54 PM   Result Value Ref Range    GFR If African American >60 >60 mL/min/1.73 m 2    GFR If Non African American >60 >60 mL/min/1.73 m 2       Labs reviewed by me.     Records reviewed:   Reviewed records from patient's previous neurology encounters.     Imaging reviewed by me:     MR-BRAIN-W/O   Final Result      1. MRI OF THE BRAIN WITHOUT CONTRAST WITHIN NORMAL LIMITS.   2. NO EVIDENCE OF MASS LESION, HETEROTOPIC GRAY MATTER, GROSS CORTICAL DYSPLASIA, OR MESIAL TEMPORAL SCLEROSIS.   3. MILD ATROPHY OF THE LEFT MAMILLARY BODY, UNCHANGED   4. PARTIALLY EMPTY SELLA, UNCHANGED   5. MILD WHITE MATTER CHANGES, AS BEFORE      CT-CTA NECK WITH & W/O-POST PROCESSING   Final Result      CT angiogram of the neck within normal limits.      DX-CHEST-PORTABLE (1 VIEW)   Final Result         1. No acute cardiopulmonary abnormalities are identified.      CT-CTA HEAD WITH & W/O-POST PROCESS   Final Result         1. No hemodynamically significant narrowing of the major intracranial vessels.      CT-CEREBRAL PERFUSION ANALYSIS   Final Result      1.  Cerebral blood flow less than 30% likely representing completed infarct = 0 mL.      2.  T Max more than 6 seconds likely representing combination of completed infarct and ischemia = 0 mL.      3.  Mismatched volume likely representing ischemic brain/penumbra = None      4.  Please note that the cerebral perfusion was performed on the limited brain tissue around the basal ganglia region. Infarct/ischemia outside the CT perfusion sections can be missed in this study.      CT-HEAD W/O   Final Result      1.  White matter lucencies most consistent with  small vessel ischemic change versus demyelination or gliosis.      2.  Otherwise, Head CT without contrast with no acute findings. No evidence of acute cerebral hemorrhage or mass lesion.      EC-ECHOCARDIOGRAM COMPLETE W/O CONT    (Results Pending)       ASSESSMENT AND PLAN:  38-year old female with PMhx significant for intellectual disorder/MRDD, epilepsy (reportedly on Keppra and Lamictal, though seizure history is unclear at this time), type II diabetes mellitus, and anxiety/depression/behavioral issues who presented [facility] on [facility] for a chief complaint of Left sided weakness and falls at her day care facility; CT brain, CTA Brain/neck unremarkable; patient however determined not to be a candidate for IV tPA secondary to presentation time greater than 4.5 hours from presumed time of symptom onset. NIHSS 1 at time of presentation. MRI brain has revealed no acute intracranial abnormality; EEG normal study; patient now at neurological baseline with NIH 0. Differential diagnoses at this time include seizure vs. TIA.     Recommendations/Plan:     -q4h and PRN neuro assessment; VS per nursing/unit protocol.    -Telemetry; currently SR. Screen for arrhythmia. TTE pending.   - mg PO q day and Atorvastatin 40 mg PO q HS. Note LDL 39.  -Recommend aggressive BG management per primary team; note A1c 5.4.   -PT/OT/SLP eval and treat.   -Continue home doses of Keppra and Lamictal. Outpatient neurology follow-up planned with Dr. Cerrato (established provider) after discharge.   -Counseled patient and and family at length regarding life style and risk factor modification for primary stroke prevention.   -All other medical management per primary team.      The plan of care above has been discussed with Dr. Torres Lynn MSN, BSN, AGNP-C  [place] [facility]

## 2019-03-07 NOTE — H&P
Hospital Medicine History & Physical Note    Date of Service  3/6/2019    Primary Care Physician  Ulisses Fierro M.D.    Consultants  Neurology    Code Status  Full code    Chief Complaint  Altered mental status with facial drooping and dysarthria.    History of Presenting Illness  History, review of systems, and physical exam are all limited due to altered mental status.  Information were obtained from sister at bedside and medical records.  38 y.o. female with past medical history of Parkinson disease, mental retardation was brought in after was found to have facial drooping and dysarthria.  Currently she had a fall at work but it is not clear if she had any seizure-like activity.  She has a history of seizure disorder and she is on Keppra.  When she came here her initial imaging studies were negative for any acute abnormalities.  Neurology consulted and felt that patient might have a seizure.  She was loaded with Keppra.  On exam patient had mild facial drooping on the left with mild weakness on the left upper and lower extremities.  MRI of the brain pending.  Started on aspirin and statin.  No acute distress noted.  She denies any chest pain or shortness of breath.    Review of Systems  Review of Systems   Unable to perform ROS: Mental acuity       Past Medical History   has a past medical history of Anxiety; Depression; Diabetes; Epilepsy (HCC); Mental disability; Sleep apnea; Snoring; and Unspecified urinary incontinence.    Surgical History   has a past surgical history that includes lesion excision general (2/20/2014).     Family History  Family history was reviewed and found noncontributory.    Social History   reports that she has quit smoking. Her smoking use included Cigarettes. She has never used smokeless tobacco. She reports that she does not drink alcohol or use drugs.    Allergies  Allergies   Allergen Reactions   • Acetaminophen      Unknown per patient and caregiver   • Other Misc      bees    • Penicillins Hives     Per patien   • Shellfish Allergy Hives     Get hives per patient       Medications  Prior to Admission Medications   Prescriptions Last Dose Informant Patient Reported? Taking?   carbidopa-levodopa (SINEMET)  MG Tab 3/6/2019 at 0800 Caregiver No No   Sig: Take 1 Tab by mouth 3 times a day.   levETIRAcetam (KEPPRA) 250 MG tablet 3/6/2019 at 0800 Caregiver Yes Yes   Sig: Take 250 mg by mouth 2 times a day.   metFORMIN ER (GLUCOPHAGE XR) 500 MG TABLET SR 24 HR 3/6/2019 at 0800 Caregiver Yes Yes   Sig: Take 500 mg by mouth every day.   multivitamin (THERAGRAN) Tab 3/6/2019 at 0800 Caregiver Yes Yes   Sig: Take 1 Tab by mouth every day.   thiamine (THIAMINE) 100 MG Tab 3/6/2019 at 0800 Caregiver No No   Sig: Take 1 Tab by mouth every day for 180 days.   ziprasidone (GEODON) 60 MG Cap 3/5/2019 at 2000 Caregiver Yes Yes   Sig: Take 60 mg by mouth every evening.      Facility-Administered Medications: None       Physical Exam  Temp:  [36.6 °C (97.9 °F)] 36.6 °C (97.9 °F)  Pulse:  [] 107  Resp:  [13-32] 16  BP: (126)/(86) 126/86  SpO2:  [91 %-98 %] 91 %    Physical Exam   Constitutional: No distress.   HENT:   Head: Normocephalic and atraumatic.   Mouth/Throat: No oropharyngeal exudate.   Eyes: Pupils are equal, round, and reactive to light. No scleral icterus.   Neck: Normal range of motion. Neck supple. No thyromegaly present.   Cardiovascular: Normal rate and regular rhythm.  Exam reveals no gallop and no friction rub.    Pulmonary/Chest: Effort normal and breath sounds normal. No respiratory distress.   Abdominal: Soft. She exhibits no distension. There is no tenderness.   Musculoskeletal: She exhibits no tenderness or deformity.   Neurological: She is alert.   Mild facial drooping on the left.  Mild weakness on left upper and lower extremities.  Dysmetria and drifting noted on the left upper extremity.   Skin: Skin is warm and dry. She is not diaphoretic.   Psychiatric: Her affect  is blunt. She is slowed.       Laboratory:  Recent Labs      03/06/19   1452   WBC  10.2   RBC  4.95   HEMOGLOBIN  15.2   HEMATOCRIT  49.6*   MCV  100.2*   MCH  30.7   MCHC  30.6*   RDW  48.5   PLATELETCT  295   MPV  9.7     Recent Labs      03/06/19   1452   SODIUM  140   POTASSIUM  4.5   CHLORIDE  106   CO2  26   GLUCOSE  81   BUN  11   CREATININE  0.74   CALCIUM  9.9     Recent Labs      03/06/19   1452   ALTSGPT  8   ASTSGOT  12   ALKPHOSPHAT  58   TBILIRUBIN  0.8   GLUCOSE  81     Recent Labs      03/06/19   1452   APTT  23.7*   INR  1.09             Recent Labs      03/06/19   1452   TROPONINI  <0.01       Urinalysis:    No results found     Imaging:  CT-CTA NECK WITH & W/O-POST PROCESSING   Final Result      CT angiogram of the neck within normal limits.      DX-CHEST-PORTABLE (1 VIEW)   Final Result         1. No acute cardiopulmonary abnormalities are identified.      CT-CTA HEAD WITH & W/O-POST PROCESS   Final Result         1. No hemodynamically significant narrowing of the major intracranial vessels.      CT-CEREBRAL PERFUSION ANALYSIS   Final Result      1.  Cerebral blood flow less than 30% likely representing completed infarct = 0 mL.      2.  T Max more than 6 seconds likely representing combination of completed infarct and ischemia = 0 mL.      3.  Mismatched volume likely representing ischemic brain/penumbra = None      4.  Please note that the cerebral perfusion was performed on the limited brain tissue around the basal ganglia region. Infarct/ischemia outside the CT perfusion sections can be missed in this study.      CT-HEAD W/O   Final Result      1.  White matter lucencies most consistent with small vessel ischemic change versus demyelination or gliosis.      2.  Otherwise, Head CT without contrast with no acute findings. No evidence of acute cerebral hemorrhage or mass lesion.      MR-BRAIN-W/O    (Results Pending)   EC-ECHOCARDIOGRAM COMPLETE W/O CONT    (Results Pending)          Assessment/Plan:  I anticipate this patient is appropriate for observation status at this time.    * Metabolic encephalopathy- (present on admission)   Assessment & Plan    Suspecting that patient had a seizure versus a stroke.  Patient has a history of seizure disorder.  Imaging studies including CT angiogram of the head and neck and CT scan of the head were negative for any acute abnormalities.  Was loaded with Keppra per neurology.  Pending MRI of the brain.  Seizure precautions.  PRN Ativan for breakthrough seizures.     Stroke (cerebrum) ([facility])- (present on admission)   Assessment & Plan    Possible stroke as patient has left facial drooping and left-sided weakness.  Continue with aspirin and statin for now.  PT/OT/speech.  Permissive hypertension for 24 hours.  Echocardiogram.  Check hemoglobin A1c.  MRI of the brain pending.  Neurology following.     Cognitive decline- (present on admission)   Assessment & Plan    History of mental retardation.     Seizure (HCC)- (present on admission)   Assessment & Plan    History cough.  Currently on Keppra IV.  EEG in the morning.  Seizure precautions.     Parkinson disease ([facility])- (present on admission)   Assessment & Plan    History of.  Continue Sinemet.     Bipolar 1 disorder, depressed, moderate ([facility])- (present on admission)   Assessment & Plan    Continue home dose Geodon.         VTE prophylaxis: Lovenox

## 2019-03-08 ENCOUNTER — PATIENT OUTREACH (OUTPATIENT)
Dept: HEALTH INFORMATION MANAGEMENT | Facility: OTHER | Age: 39
End: 2019-03-08

## 2019-03-08 ENCOUNTER — APPOINTMENT (OUTPATIENT)
Dept: CARDIOLOGY | Facility: MEDICAL CENTER | Age: 39
End: 2019-03-08
Attending: FAMILY MEDICINE
Payer: MEDICARE

## 2019-03-08 VITALS
HEART RATE: 87 BPM | DIASTOLIC BLOOD PRESSURE: 84 MMHG | WEIGHT: 200 LBS | HEIGHT: 60 IN | OXYGEN SATURATION: 96 % | RESPIRATION RATE: 16 BRPM | BODY MASS INDEX: 39.27 KG/M2 | TEMPERATURE: 98.7 F | SYSTOLIC BLOOD PRESSURE: 134 MMHG

## 2019-03-08 LAB
LV EJECT FRACT  99904: 65
LV EJECT FRACT MOD 2C 99903: 63.84
LV EJECT FRACT MOD 4C 99902: 63.47
LV EJECT FRACT MOD BP 99901: 63.54

## 2019-03-08 PROCEDURE — 96376 TX/PRO/DX INJ SAME DRUG ADON: CPT | Mod: XU

## 2019-03-08 PROCEDURE — 99217 PR OBSERVATION CARE DISCHARGE: CPT | Performed by: HOSPITALIST

## 2019-03-08 PROCEDURE — G0378 HOSPITAL OBSERVATION PER HR: HCPCS

## 2019-03-08 PROCEDURE — 93306 TTE W/DOPPLER COMPLETE: CPT | Mod: 26 | Performed by: INTERNAL MEDICINE

## 2019-03-08 PROCEDURE — 700102 HCHG RX REV CODE 250 W/ 637 OVERRIDE(OP): Performed by: FAMILY MEDICINE

## 2019-03-08 PROCEDURE — 97161 PT EVAL LOW COMPLEX 20 MIN: CPT

## 2019-03-08 PROCEDURE — 97165 OT EVAL LOW COMPLEX 30 MIN: CPT

## 2019-03-08 PROCEDURE — 93306 TTE W/DOPPLER COMPLETE: CPT

## 2019-03-08 PROCEDURE — 700111 HCHG RX REV CODE 636 W/ 250 OVERRIDE (IP): Performed by: FAMILY MEDICINE

## 2019-03-08 PROCEDURE — 96372 THER/PROPH/DIAG INJ SC/IM: CPT

## 2019-03-08 PROCEDURE — A9270 NON-COVERED ITEM OR SERVICE: HCPCS | Performed by: FAMILY MEDICINE

## 2019-03-08 PROCEDURE — 700105 HCHG RX REV CODE 258: Performed by: PSYCHIATRY & NEUROLOGY

## 2019-03-08 PROCEDURE — 700111 HCHG RX REV CODE 636 W/ 250 OVERRIDE (IP): Performed by: PSYCHIATRY & NEUROLOGY

## 2019-03-08 RX ORDER — LAMOTRIGINE 25 MG/1
25 TABLET ORAL DAILY
Qty: 30 TAB | Refills: 2 | Status: SHIPPED | OUTPATIENT
Start: 2019-03-08 | End: 2019-05-10 | Stop reason: SDUPTHER

## 2019-03-08 RX ORDER — LEVETIRACETAM 1000 MG/1
1000 TABLET ORAL 2 TIMES DAILY
Qty: 60 TAB | Refills: 2 | Status: SHIPPED | OUTPATIENT
Start: 2019-03-08 | End: 2019-05-01

## 2019-03-08 RX ADMIN — CARBIDOPA AND LEVODOPA 1 TABLET: 25; 100 TABLET ORAL at 05:09

## 2019-03-08 RX ADMIN — SENNOSIDES AND DOCUSATE SODIUM 2 TABLET: 8.6; 5 TABLET ORAL at 05:09

## 2019-03-08 RX ADMIN — ASPIRIN 325 MG: 325 TABLET ORAL at 05:08

## 2019-03-08 RX ADMIN — Medication 100 MG: at 05:08

## 2019-03-08 RX ADMIN — ENOXAPARIN SODIUM 40 MG: 100 INJECTION SUBCUTANEOUS at 05:09

## 2019-03-08 RX ADMIN — SODIUM CHLORIDE 1000 MG: 9 INJECTION, SOLUTION INTRAVENOUS at 05:09

## 2019-03-08 RX ADMIN — CARBIDOPA AND LEVODOPA 1 TABLET: 25; 100 TABLET ORAL at 11:37

## 2019-03-08 ASSESSMENT — GAIT ASSESSMENTS
DISTANCE (FEET): 125
GAIT LEVEL OF ASSIST: SUPERVISED

## 2019-03-08 ASSESSMENT — COGNITIVE AND FUNCTIONAL STATUS - GENERAL
SUGGESTED CMS G CODE MODIFIER MOBILITY: CH
SUGGESTED CMS G CODE MODIFIER DAILY ACTIVITY: CI
DAILY ACTIVITIY SCORE: 23
HELP NEEDED FOR BATHING: A LITTLE
MOBILITY SCORE: 24

## 2019-03-08 ASSESSMENT — ACTIVITIES OF DAILY LIVING (ADL): TOILETING: INDEPENDENT

## 2019-03-08 NOTE — THERAPY
"Occupational Therapy Evaluation completed.   Functional Status:  Pt presents to [facility] following c/o L sided weakness, admitted for stroke workup, MRI and EEG negative, concerns for seizure. Pt was able to perform bed mobility with supervision from flat hob, LB dressing with sba, h/g tasks with sba standing sink side, toileting and toilet t/f's with supervision, pt c/o dizziness, vitals obtain and stable, discussed w/ RN who verbalized could be 2/2 medication changes. Pt does appear to be close to her baseline, will have 24/7 assist at her group home. No further acute OT services indicated at this time.   Plan of Care: Patient with no further skilled OT needs in the acute care setting at this time  Discharge Recommendations:  Equipment: No Equipment Needed. Post-acute therapy Anticipate that the patient will have no further occupational therapy needs after discharge from the hospital.       See \"Rehab Therapy-Acute\" Patient Summary Report for complete documentation.    "

## 2019-03-08 NOTE — PROGRESS NOTES
[facility] Medicine Daily Progress Note    Date of Service  3/7/2019    Chief Complaint  38 y.o. female admitted 3/6/2019 with altered mental status and left-sided weakness    Interval Problem Update  3/7: MRI brain and EEG negative.  Echo pending read.  Patient still feeling dizzy    Disposition  Pending echo and any further neuro workup    Review of Systems  Review of Systems   Unable to perform ROS: Mental acuity   Neurological: Positive for dizziness.        Physical Exam  Temp:  [36.2 °C (97.2 °F)-37.1 °C (98.8 °F)] 36.3 °C (97.4 °F)  Pulse:  [] 98  Resp:  [13-26] 20  BP: (116-117)/(78-80) 117/78  SpO2:  [91 %-99 %] 91 %    Physical Exam   Constitutional: She appears well-developed.   HENT:   Head: Normocephalic.   Eyes: Conjunctivae are normal.   Cardiovascular: Normal rate.  Exam reveals no gallop.    Pulmonary/Chest: No respiratory distress. She has no wheezes.   Abdominal: She exhibits no distension. There is no tenderness.   Neurological: She is alert.   Strength mostly equal both hand        Fluids  No intake or output data in the 24 hours ending 03/07/19 1725    Laboratory  Recent Labs      03/06/19   1452  03/06/19   2354   WBC  10.2  11.8*   RBC  4.95  4.79   HEMOGLOBIN  15.2  14.8   HEMATOCRIT  49.6*  44.6   MCV  100.2*  93.1   MCH  30.7  30.9   MCHC  30.6*  33.2*   RDW  48.5  44.3   PLATELETCT  295  347   MPV  9.7  9.6     Recent Labs      03/06/19   1452  03/06/19   2354   SODIUM  140  141   POTASSIUM  4.5  4.3   CHLORIDE  106  106   CO2  26  23   GLUCOSE  81  84   BUN  11  11   CREATININE  0.74  0.83   CALCIUM  9.9  9.6     Recent Labs      03/06/19   1452   APTT  23.7*   INR  1.09         Recent Labs      03/06/19   2354   TRIGLYCERIDE  45   HDL  57   LDL  39       Imaging  MR-BRAIN-W/O   Final Result      1. MRI OF THE BRAIN WITHOUT CONTRAST WITHIN NORMAL LIMITS.   2. NO EVIDENCE OF MASS LESION, HETEROTOPIC GRAY MATTER, GROSS CORTICAL DYSPLASIA, OR MESIAL TEMPORAL SCLEROSIS.   3. MILD  ATROPHY OF THE LEFT MAMILLARY BODY, UNCHANGED   4. PARTIALLY EMPTY SELLA, UNCHANGED   5. MILD WHITE MATTER CHANGES, AS BEFORE      CT-CTA NECK WITH & W/O-POST PROCESSING   Final Result      CT angiogram of the neck within normal limits.      DX-CHEST-PORTABLE (1 VIEW)   Final Result         1. No acute cardiopulmonary abnormalities are identified.      CT-CTA HEAD WITH & W/O-POST PROCESS   Final Result         1. No hemodynamically significant narrowing of the major intracranial vessels.      CT-CEREBRAL PERFUSION ANALYSIS   Final Result      1.  Cerebral blood flow less than 30% likely representing completed infarct = 0 mL.      2.  T Max more than 6 seconds likely representing combination of completed infarct and ischemia = 0 mL.      3.  Mismatched volume likely representing ischemic brain/penumbra = None      4.  Please note that the cerebral perfusion was performed on the limited brain tissue around the basal ganglia region. Infarct/ischemia outside the CT perfusion sections can be missed in this study.      CT-HEAD W/O   Final Result      1.  White matter lucencies most consistent with small vessel ischemic change versus demyelination or gliosis.      2.  Otherwise, Head CT without contrast with no acute findings. No evidence of acute cerebral hemorrhage or mass lesion.      EC-ECHOCARDIOGRAM COMPLETE W/O CONT    (Results Pending)        Assessment/Plan  * Metabolic encephalopathy- (present on admission)   Assessment & Plan    Suspecting that patient had a seizure versus a stroke.  Patient has a history of seizure disorder.  Imaging studies including CT angiogram of the head and neck and CT scan of the head were negative for any acute abnormalities.  Was loaded with Keppra per neurology.  EEG negative  Seizure precautions.  PRN Ativan for breakthrough seizures.     Stroke (cerebrum) ([facility])- (present on admission)   Assessment & Plan    Possible stroke as patient has left facial drooping and left-sided  weakness.  Continue with aspirin and statin for now.  PT/OT/speech.  Permissive hypertension for 24 hours.  Echocardiogram pending read.  hemoglobin A1c 5.4  MRI of the brain negative.  Neurology following.     Cognitive decline- (present on admission)   Assessment & Plan    History of mental retardation.     Seizure (HCC)- (present on admission)   Assessment & Plan    History cough.  Currently on Keppra IV.  EEG in the morning.  Seizure precautions.     Parkinson disease (HCC)- (present on admission)   Assessment & Plan    History of.  Continue Sinemet.     Bipolar 1 disorder, depressed, moderate (HCC)- (present on admission)   Assessment & Plan    Continue home dose Geodon.          VTE prophylaxis: Enoxaparin

## 2019-03-08 NOTE — CARE PLAN
Problem: Safety  Goal: Will remain free from falls  Outcome: PROGRESSING AS EXPECTED  Bed alarm on, bed locked in lowest position, upper side rails up, call light and personal items within reach, non skids socks on.    Problem: Knowledge Deficit  Goal: Knowledge of disease process/condition, treatment plan, diagnostic tests, and medications will improve  Outcome: PROGRESSING AS EXPECTED  POC discussed with patient, all questions answered.

## 2019-03-08 NOTE — PROGRESS NOTES
Patient awake, alert, and an active participant in bedside.  She denies pain, discomfort, or any other needs at this time.  Bed in low and locked position, bed alarm on, call light in reach.

## 2019-03-08 NOTE — DISCHARGE INSTRUCTIONS
Discharge Instructions    Discharged to group home by car with relative. Discharged via wheelchair, hospital escort: Yes.  Special equipment needed: Not Applicable    Be sure to schedule a follow-up appointment with your primary care doctor or any specialists as instructed.     Discharge Plan:   Influenza Vaccine Indication: Not indicated: Previously immunized this influenza season and > 8 years of age    I understand that a diet low in cholesterol, fat, and sodium is recommended for good health. Unless I have been given specific instructions below for another diet, I accept this instruction as my diet prescription.   Other diet: regular diet    Special Instructions: None    · Is patient discharged on Warfarin / Coumadin?   No     Depression / Suicide Risk    As you are discharged from this [facility] facility, it is important to learn how to keep safe from harming yourself.    Recognize the warning signs:  · Abrupt changes in personality, positive or negative- including increase in energy   · Giving away possessions  · Change in eating patterns- significant weight changes-  positive or negative  · Change in sleeping patterns- unable to sleep or sleeping all the time   · Unwillingness or inability to communicate  · Depression  · Unusual sadness, discouragement and loneliness  · Talk of wanting to die  · Neglect of personal appearance   · Rebelliousness- reckless behavior  · Withdrawal from people/activities they love  · Confusion- inability to concentrate     If you or a loved one observes any of these behaviors or has concerns about self-harm, here's what you can do:  · Talk about it- your feelings and reasons for harming yourself  · Remove any means that you might use to hurt yourself (examples: pills, rope, extension cords, firearm)  · Get professional help from the community (Mental Health, Substance Abuse, psychological counseling)  · Do not be alone:Call your Safe Contact- someone whom you trust who will be  there for you.  · Call your local CRISIS HOTLINE 347-0277 or 572-348-1242  · Call your local Children's Mobile Crisis Response Team [place] (352) 609-2549 or www.[employer]  · Call the toll free National Suicide Prevention Hotlines   · National Suicide Prevention Lifeline 929-820-QIOI (4772)  · National [employer] Line Network 800-SUICIDE (720-7578)

## 2019-03-08 NOTE — THERAPY
"Physical Therapy Evaluation completed.   Bed Mobility:  Supine to Sit: Supervised  Transfers: Sit to Stand: Supervised  Gait: Level Of Assist: Supervised with No Equipment Needed       Plan of Care: Patient with no further skilled PT needs in the acute care setting at this time  Discharge Recommendations: Equipment: No Equipment Needed.  Pt presents Left sided weakness and falls, CT negative, meds changed for seizure control. Today, pt was able to get out of bed and ambulate using no AD, but did report feeling dizzy. Vitals taken: BP and pulse ox stable, nsg thinks possibly due to change in Keppra. Pt will return to group home where she will have 24/7 assist. No further inpt PT needs.     See \"Rehab Therapy-Acute\" Patient Summary Report for complete documentation.     "

## 2019-03-09 NOTE — DISCHARGE SUMMARY
Hospital Medicine Discharge Note     Admit Date:  3/6/2019       Discharge Date:   3/8/2019    Attending Physician:  Rei Guzman     Diagnoses (includes active and resolved):   Principal Problem:    Metabolic encephalopathy POA: Yes  Active Problems:    Stroke (cerebrum) (HCC) POA: Yes    Bipolar 1 disorder, depressed, moderate (HCC) POA: Yes    Parkinson disease (HCC) POA: Yes    Seizure (HCC) POA: Yes    Cognitive decline POA: Yes  Resolved Problems:    * No resolved hospital problems. *      Hospital Summary (Brief Narrative):       38 y.o. female w/h/o Parkinson disease, mental retardation was brought in after was found to have facial drooping and dysarthria. She had a fall at work but it is not clear if she had any seizure-like activity.  She has a history of seizure disorder and she is on Keppra.    She was admitted for workup.  Neurology was consulted.  Her workup was negative including echo, MRI, CTA, CT, EEG.  Neuro decided to increase her Keppra to 1000 twice daily and continue the Lamictal which she may have recently discontinued.  Thus the patient was able to be discharged back to her group home.         Consultants:      Neuro Kiner    Procedures:        This is a normal video EEG recording in the awake and drowsy state(s).  Clinical correlation is recommended.     Discharge Medications:           Medication List      START taking these medications      Instructions   lamoTRIgine 25 MG Tabs  Commonly known as:  LAMICTAL   Take 1 Tab by mouth every day.  Dose:  25 mg        CHANGE how you take these medications      Instructions   levetiracetam 1000 MG tablet  What changed:  · medication strength  · how much to take  Commonly known as:  KEPPRA   Take 1 Tab by mouth 2 times a day.  Dose:  1000 mg        CONTINUE taking these medications      Instructions   carbidopa-levodopa  MG Tabs  Commonly known as:  SINEMET   Doctor's comments:  Sinemet 25/100 half tab  3 times per day for 2 weeks, then Sinemet  25/100 one tab three times per day  Take 1 Tab by mouth 3 times a day.  Dose:  1 Tab     metFORMIN  MG Tb24  Commonly known as:  GLUCOPHAGE XR   Take 500 mg by mouth every day.  Dose:  500 mg     multivitamin Tabs   Take 1 Tab by mouth every day.  Dose:  1 Tab     thiamine 100 MG Tabs  Commonly known as:  THIAMINE   Take 1 Tab by mouth every day for 180 days.  Dose:  100 mg     ziprasidone 60 MG Caps  Commonly known as:  GEODON   Take 60 mg by mouth every evening.  Dose:  60 mg          Disposition:   Discharge home    Activity:   As tolerated    Code status:   Full code    Primary Care Provider:    Ulisses Fierro M.D.    Follow up appointment details :        [facility]  [address]  [address]  [facility] [address]  [phone]    Go on 3/20/2019  Please arrive at 1:40 pm for your appointment with primary care.     Future Appointments  Date Time Provider Department Center   5/1/2019 10:40 AM Lubna Cerrato M.D. [facility] None       Pending Studies:        None    Time spent on discharge day patient visit: 44 minutes    #################################################    Interval history/exam for day of discharge:    Vitals:    03/07/19 2333 03/08/19 0400 03/08/19 0800 03/08/19 1123   BP: 105/64 125/86 135/98 134/84   Pulse: 94 97 87    Resp: 16 16 16    Temp: 36.1 °C (97 °F) 36.3 °C (97.3 °F) 37.1 °C (98.7 °F)    TempSrc: Temporal Temporal Temporal    SpO2: 96% 97% 98% 96%   Weight:       Height:         Weight/BMI: Body mass index is 39.06 kg/m².  Pulse Oximetry: 96 % (on RA after ambulation), O2 (LPM): 0, O2 Delivery: None (Room Air)    General:  NAD, developmentally delayed  CVS:  RRR  PULM:  CTAB, no respiratory distress    Most Recent Labs:    Lab Results   Component Value Date/Time    WBC 11.8 (H) 03/06/2019 11:54 PM    RBC 4.79 03/06/2019 11:54 PM    HEMOGLOBIN 14.8 03/06/2019 11:54 PM    HEMATOCRIT 44.6 03/06/2019 11:54 PM    MCV 93.1 03/06/2019 11:54 PM    MCH 30.9 03/06/2019 11:54 PM     MCHC 33.2 (L) 03/06/2019 11:54 PM    MPV 9.6 03/06/2019 11:54 PM    NEUTSPOLYS 61.10 03/06/2019 11:54 PM    LYMPHOCYTES 30.80 03/06/2019 11:54 PM    MONOCYTES 6.70 03/06/2019 11:54 PM    EOSINOPHILS 0.70 03/06/2019 11:54 PM    BASOPHILS 0.50 03/06/2019 11:54 PM      Lab Results   Component Value Date/Time    SODIUM 141 03/06/2019 11:54 PM    POTASSIUM 4.3 03/06/2019 11:54 PM    CHLORIDE 106 03/06/2019 11:54 PM    CO2 23 03/06/2019 11:54 PM    GLUCOSE 84 03/06/2019 11:54 PM    BUN 11 03/06/2019 11:54 PM    CREATININE 0.83 03/06/2019 11:54 PM    BUNCREATRAT 15 07/17/2018 11:59 AM      Lab Results   Component Value Date/Time    ALTSGPT 10 03/06/2019 11:54 PM    ASTSGOT 15 03/06/2019 11:54 PM    ALKPHOSPHAT 56 03/06/2019 11:54 PM    TBILIRUBIN 1.1 03/06/2019 11:54 PM    ALBUMIN 4.0 03/06/2019 11:54 PM    GLOBULIN 2.3 03/06/2019 11:54 PM    INR 1.09 03/06/2019 02:52 PM     Lab Results   Component Value Date/Time    PROTHROMBTM 14.2 03/06/2019 02:52 PM    INR 1.09 03/06/2019 02:52 PM        Imaging/ Testing:      EC-ECHOCARDIOGRAM COMPLETE W/O CONT   Final Result      MR-BRAIN-W/O   Final Result      1. MRI OF THE BRAIN WITHOUT CONTRAST WITHIN NORMAL LIMITS.   2. NO EVIDENCE OF MASS LESION, HETEROTOPIC GRAY MATTER, GROSS CORTICAL DYSPLASIA, OR MESIAL TEMPORAL SCLEROSIS.   3. MILD ATROPHY OF THE LEFT MAMILLARY BODY, UNCHANGED   4. PARTIALLY EMPTY SELLA, UNCHANGED   5. MILD WHITE MATTER CHANGES, AS BEFORE      CT-CTA NECK WITH & W/O-POST PROCESSING   Final Result      CT angiogram of the neck within normal limits.      DX-CHEST-PORTABLE (1 VIEW)   Final Result         1. No acute cardiopulmonary abnormalities are identified.      CT-CTA HEAD WITH & W/O-POST PROCESS   Final Result         1. No hemodynamically significant narrowing of the major intracranial vessels.      CT-CEREBRAL PERFUSION ANALYSIS   Final Result      1.  Cerebral blood flow less than 30% likely representing completed infarct = 0 mL.      2.  T Max more  than 6 seconds likely representing combination of completed infarct and ischemia = 0 mL.      3.  Mismatched volume likely representing ischemic brain/penumbra = None      4.  Please note that the cerebral perfusion was performed on the limited brain tissue around the basal ganglia region. Infarct/ischemia outside the CT perfusion sections can be missed in this study.      CT-HEAD W/O   Final Result      1.  White matter lucencies most consistent with small vessel ischemic change versus demyelination or gliosis.      2.  Otherwise, Head CT without contrast with no acute findings. No evidence of acute cerebral hemorrhage or mass lesion.          Instructions:      The patient was instructed to return to the ER in the event of worsening symptoms. I have counseled the patient on the importance of compliance and the patient has agreed to proceed with all medical recommendations and follow up plan indicated above.   The patient understands that all medications come with benefits and risks. Risks may include permanent injury or death and these risks can be minimized with close reassessment and monitoring.

## 2019-05-01 ENCOUNTER — OFFICE VISIT (OUTPATIENT)
Dept: NEUROLOGY | Facility: MEDICAL CENTER | Age: 39
End: 2019-05-01
Payer: MEDICARE

## 2019-05-01 VITALS
BODY MASS INDEX: 36.56 KG/M2 | HEART RATE: 80 BPM | HEIGHT: 60 IN | SYSTOLIC BLOOD PRESSURE: 112 MMHG | DIASTOLIC BLOOD PRESSURE: 68 MMHG | TEMPERATURE: 99 F | OXYGEN SATURATION: 99 % | WEIGHT: 186.2 LBS

## 2019-05-01 DIAGNOSIS — R56.9 SEIZURE (HCC): ICD-10-CM

## 2019-05-01 PROCEDURE — 99214 OFFICE O/P EST MOD 30 MIN: CPT | Performed by: PSYCHIATRY & NEUROLOGY

## 2019-05-01 RX ORDER — CARBIDOPA/LEVODOPA 25MG-250MG
1 TABLET ORAL 3 TIMES DAILY
Qty: 270 TAB | Refills: 1 | Status: SHIPPED | OUTPATIENT
Start: 2019-05-01

## 2019-05-01 RX ORDER — LEVETIRACETAM 500 MG/1
500 TABLET ORAL 3 TIMES DAILY
Qty: 270 TAB | Refills: 1 | Status: SHIPPED | OUTPATIENT
Start: 2019-05-01
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
## 2019-05-01 NOTE — PATIENT INSTRUCTIONS
IMPRESSION:    1. Bradykinesia and some tremor noticed in the past 2 years or so-- consistent with Parkinsonism Stage III  2. Mentally challenged -- was in group home for years  3. Hx of seizure- starring spells, Psychiatric   4. Family hx of psychiatric disorder   5. Weight loss-- on diet    PLAN/RECOMMENDATIONS:    I do agree with the legal guardian that the patient has parkinsonism    Up Sinemet to 25/250 one tab three times per day  Reduce Keppra to 500mg three times per day ( the patient does not have energy since she left the hospital after March 2019)        SIGNATURE:  Lubna Cerrato

## 2019-05-10 RX ORDER — LAMOTRIGINE 25 MG/1
25 TABLET ORAL DAILY
Qty: 90 TAB | Refills: 1 | Status: SHIPPED | OUTPATIENT
Start: 2019-05-10

## 2020-02-17 ENCOUNTER — APPOINTMENT (OUTPATIENT)
Dept: RADIOLOGY | Facility: MEDICAL CENTER | Age: 40
End: 2020-02-17
Attending: EMERGENCY MEDICINE
Payer: MEDICARE

## 2020-02-17 ENCOUNTER — HOSPITAL ENCOUNTER (EMERGENCY)
Facility: MEDICAL CENTER | Age: 40
End: 2020-02-17
Attending: EMERGENCY MEDICINE
Payer: MEDICARE

## 2020-02-17 VITALS
SYSTOLIC BLOOD PRESSURE: 107 MMHG | TEMPERATURE: 98.9 F | OXYGEN SATURATION: 99 % | WEIGHT: 186 LBS | HEIGHT: 60 IN | HEART RATE: 75 BPM | DIASTOLIC BLOOD PRESSURE: 82 MMHG | RESPIRATION RATE: 16 BRPM | BODY MASS INDEX: 36.52 KG/M2

## 2020-02-17 DIAGNOSIS — S06.0X1A CONCUSSION WITH LOSS OF CONSCIOUSNESS OF 30 MINUTES OR LESS, INITIAL ENCOUNTER: ICD-10-CM

## 2020-02-17 PROCEDURE — 70450 CT HEAD/BRAIN W/O DYE: CPT

## 2020-02-17 PROCEDURE — 99284 EMERGENCY DEPT VISIT MOD MDM: CPT | Mod: 25

## 2020-02-17 ASSESSMENT — PAIN SCALES - WONG BAKER: WONGBAKER_NUMERICALRESPONSE: HURTS JUST A LITTLE BIT

## 2020-02-18 NOTE — ED PROVIDER NOTES
ED Provider Note    CHIEF COMPLAINT  Chief Complaint   Patient presents with   • Fall   • Headache       HPI  Erin Santiago is a 39 y.o. female who presents with a headache.  The patient had a fall in the shower.  The patient struck her head and did have a loss of consciousness.  This happened at approximately 1 PM.  Since that time the patient has had a headache and is also had some difficulties with balance.  She has not had any vomiting.  She has not had any recent fevers.  She has not had any change in her medication.  According to the caretaker besides the difficulty with balance the patient is at her baseline.    REVIEW OF SYSTEMS  See HPI for further details. All other systems are negative.     PAST MEDICAL HISTORY  Past Medical History:   Diagnosis Date   • Anxiety    • Depression     depression    • Diabetes     dx 2013 blood sugars not checked at home   • Epilepsy (HCC)     seizure free   • Mental disability    • Sleep apnea     no cpap medicaid took it away due to non compliance   • Snoring    • Unspecified urinary incontinence     behavioral       FAMILY HISTORY  [unfilled]    SOCIAL HISTORY  Social History     Socioeconomic History   • Marital status: Single     Spouse name: Not on file   • Number of children: Not on file   • Years of education: Not on file   • Highest education level: Not on file   Occupational History   • Not on file   Social Needs   • Financial resource strain: Not on file   • Food insecurity     Worry: Not on file     Inability: Not on file   • Transportation needs     Medical: Not on file     Non-medical: Not on file   Tobacco Use   • Smoking status: Former Smoker     Types: Cigarettes   • Smokeless tobacco: Never Used   Substance and Sexual Activity   • Alcohol use: No   • Drug use: No   • Sexual activity: Never   Lifestyle   • Physical activity     Days per week: Not on file     Minutes per session: Not on file   • Stress: Not on file   Relationships   • Social connections      Talks on phone: Not on file     Gets together: Not on file     Attends Faith service: Not on file     Active member of club or organization: Not on file     Attends meetings of clubs or organizations: Not on file     Relationship status: Not on file   • Intimate partner violence     Fear of current or ex partner: Not on file     Emotionally abused: Not on file     Physically abused: Not on file     Forced sexual activity: Not on file   Other Topics Concern   • Not on file   Social History Narrative   • Not on file       SURGICAL HISTORY  Past Surgical History:   Procedure Laterality Date   • LESION EXCISION GENERAL  2/20/2014    Performed by Lemuel Fernandez M.D. at SURGERY SAME DAY [facility]       CURRENT MEDICATIONS  Home Medications    **Home medications have not yet been reviewed for this encounter**         ALLERGIES  Allergies   Allergen Reactions   • Acetaminophen      Unknown per patient and caregiver   • Other Misc      bees   • Penicillins Hives     Per patien   • Shellfish Allergy Hives     Get hives per patient       PHYSICAL EXAM  VITAL SIGNS: /90   Pulse 84   Temp 37.2 °C (98.9 °F) (Oral)   Resp 16   Ht 1.524 m (5')   Wt 84.4 kg (186 lb)   SpO2 97%   BMI 36.33 kg/m²       Constitutional: Well developed, Well nourished, No acute distress, Non-toxic appearance.   HENT: Normocephalic, Atraumatic, Bilateral external ears normal, Oropharynx moist, No oral exudates, Nose normal.   Eyes: PERRLA, EOMI, Conjunctiva normal, No discharge.   Neck: Normal range of motion, No tenderness, Supple, No stridor.   Lymphatic: No lymphadenopathy noted.   Cardiovascular: Normal heart rate, Normal rhythm, No murmurs, No rubs, No gallops.   Thorax & Lungs: Normal breath sounds, No respiratory distress, No wheezing, No chest tenderness.   Abdomen: Bowel sounds normal, Soft, No tenderness, No masses, No pulsatile masses.   Skin: Warm, Dry, No erythema, No rash.   Back: No tenderness, No CVA tenderness.    Extremities: Intact distal pulses, No edema, No tenderness, No cyanosis, No clubbing.   Musculoskeletal: Good range of motion in all major joints. No tenderness to palpation or major deformities noted.   Neurologic: Alert & oriented x 3, Normal motor function, Normal sensory function, No focal deficits noted.   Psychiatric: Affect normal, Judgment normal, Mood normal.       RADIOLOGY/PROCEDURES  CT-HEAD W/O   Final Result      1.  No acute intracranial abnormality.   2.  Cerebellar atrophy again seen.            COURSE & MEDICAL DECISION MAKING  Pertinent Labs & Imaging studies reviewed. (See chart for details)  This a 39-year-old female who presents to the emergency department after she fell and struck her head with a loss of consciousness.  I suspect the patient does have a significant concussion.  The patient had a CT scan that does not show any evidence of intracranial traumatic injury.  Subsequently the patient is able to ambulate with no difficulty with balance.  She did have some urinary incontinence but this is her baseline.  Otherwise on repeat exam I do not see any other evidence of injury.  The patient be discharged home with head injury instructions.  She will return if she is acutely worse.  She is also instructed to follow-up with her primary care provider within the next week.    FINAL IMPRESSION  1.  Concussion with loss of consciousness    Disposition  The patient will be discharged in stable condition         Electronically signed by: Diony Oliver M.D., 2/17/2020 4:55 PM

## 2021-11-23 RX ORDER — MULTIVIT,CALC,MINS/IRON/FOLIC 9MG-400MCG
TABLET ORAL
Qty: 30 TABLET | Refills: 11 | Status: SHIPPED | OUTPATIENT
Start: 2021-11-23 | End: 2022-10-19

## 2021-12-16 ENCOUNTER — OFFICE VISIT (OUTPATIENT)
Dept: MEDICAL GROUP | Facility: CLINIC | Age: 41
End: 2021-12-16
Payer: MEDICARE

## 2021-12-16 VITALS
SYSTOLIC BLOOD PRESSURE: 119 MMHG | OXYGEN SATURATION: 93 % | HEIGHT: 61 IN | DIASTOLIC BLOOD PRESSURE: 88 MMHG | BODY MASS INDEX: 41.65 KG/M2 | WEIGHT: 220.6 LBS | HEART RATE: 92 BPM

## 2021-12-16 DIAGNOSIS — F79 MENTAL DISABILITY: ICD-10-CM

## 2021-12-16 PROCEDURE — 99213 OFFICE O/P EST LOW 20 MIN: CPT | Mod: GE | Performed by: STUDENT IN AN ORGANIZED HEALTH CARE EDUCATION/TRAINING PROGRAM

## 2021-12-16 RX ORDER — METHION/INOS/CHOL BT/B COM/LIV 110MG-86MG
CAPSULE ORAL
COMMUNITY
Start: 2021-09-23 | End: 2021-12-16

## 2021-12-16 RX ORDER — THIAMINE MONONITRATE (VIT B1) 100 MG
TABLET ORAL
COMMUNITY
Start: 2019-09-17 | End: 2021-12-16

## 2021-12-16 RX ORDER — METFORMIN HYDROCHLORIDE EXTENDED-RELEASE TABLETS 500 MG/1
TABLET, FILM COATED, EXTENDED RELEASE ORAL
COMMUNITY
Start: 2015-12-23 | End: 2021-12-16

## 2021-12-16 RX ORDER — CARBIDOPA/LEVODOPA 25MG-250MG
TABLET ORAL
COMMUNITY
Start: 2019-09-17 | End: 2021-12-16

## 2021-12-16 RX ORDER — DIAPER,BRIEF,ADULT, DISPOSABLE
EACH MISCELLANEOUS
COMMUNITY
Start: 2021-10-07 | End: 2022-04-30

## 2021-12-16 RX ORDER — MULTIVITAMIN
CAPSULE ORAL
COMMUNITY
Start: 2015-07-07 | End: 2022-09-30

## 2021-12-16 RX ORDER — CHOLECALCIFEROL (VITAMIN D3) 125 MCG
CAPSULE ORAL
COMMUNITY
Start: 2019-12-31 | End: 2021-12-16

## 2021-12-16 NOTE — PROGRESS NOTES
Subjective:     CC: Needs physical exam for group home administrative purposes    HPI:   Erin is a 41-year-old female with a very complex past medical history, including mental disability, epilepsy, diabetes, bipolar, polycystic ovarian syndrome presents today with the above chief complaint.  Her caregiver states that she is current on management of her multiple medical issues, as well as preventive care, and the only requirement today is a physical exam.    Problem   Mental Disability    Patient presents for physical exam due to group home requirements.  States that she is up-to-date on all of her care for chronic medical issues, as well as preventive care.         Current Outpatient Medications Ordered in Epic   Medication Sig Dispense Refill   • Incontinence Supply Disposable (PREVAIL SUPER PLUS XLARGE) Misc      • Multiple Vitamin (MULTIVITAMIN) capsule MULTIVITAMINS CAPS     • GNP MELATONIN MAXIMUM STRENGTH 5 MG Tab TAKE 1 TABLET BY MOUTH AT BEDTIME. 30 Tablet 11   • lamoTRIgine (LAMICTAL) 25 MG Tab Take 1 Tab by mouth every day. 90 Tab 1   • carbidopa-levodopa (SINEMET)  MG Tab Take 1 Tab by mouth 3 times a day. 270 Tab 1   • levETIRAcetam (KEPPRA) 500 MG Tab Take 1 Tab by mouth 3 times a day. 270 Tab 1   • thiamine (THIAMINE) 100 MG Tab TAKE 1 TABLET BY MOUTH ONCE DAILY. 90 Tab 1   • metFORMIN ER (GLUCOPHAGE XR) 500 MG TABLET SR 24 HR Take 500 mg by mouth every day.     • multivitamin (THERAGRAN) Tab Take 1 Tab by mouth every day.     • ziprasidone (GEODON) 60 MG Cap Take 60 mg by mouth every evening.       No current Epic-ordered facility-administered medications on file.       Health Maintenance: Deferred    ROS:  Gen: no fevers/chills, no changes in weight  Eyes: no changes in vision  ENT: no sore throat, no hearing loss, no bloody nose; mild rhinorrhea  Pulm: no sob, no cough  CV: no chest pain, no palpitations  GI: no nausea/vomiting, no diarrhea  : no dysuria  MSk: no myalgias  Skin: no  "rash  Neuro: no headaches, no numbness/tingling  Heme/Lymph: no easy bruising      Objective:     Exam:  /88   Pulse 92   Ht 1.549 m (5' 1\")   Wt 100 kg (220 lb 9.6 oz)   SpO2 93%   BMI 41.68 kg/m²  Body mass index is 41.68 kg/m².    Gen: Alert and oriented, No apparent distress.  Obese.  Diffuse hirsutism.  Neck: Neck is supple without lymphadenopathy.  No thyromegaly.  No  Lungs: Normal effort, CTA bilaterally, no wheezes, rhonchi, or rales  CV: Regular rate and rhythm. No murmurs, rubs, or gallops.  Ext: No clubbing, cyanosis, edema.  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  Head: Normocephalic, atraumatic.  Scalp without any hair loss; there is diffuse dander.  There is mild dry skin to the face.  Face is mildly syndromic.  Vascular: 2+ radial and dorsalis pedis pulses bilaterally.  Spine:  straight without tenderness to palpation.  Straight also with forward bending.  MSK: 5 out of 5 strength in all extremities bilaterally.  Patient is able to stand and walk independently.  Neuro: No focal deficits, cranial nerves II through XII grossly intact.  DTRs normal at biceps and patellar tendons bilaterally.      Assessment & Plan:     41 y.o. female with the following -     Problem List Items Addressed This Visit     Mental disability     Physical exam performed today without any acute concerns, no signs of illness.  She does have stigmata of polycystic ovarian syndrome, including hirsutism, obesity.   Otherwise, heart, lungs, abdomen and remainder of exam without concerns. Blood pressure is essentially normal.                      No follow-ups on file.    Please note that this dictation was created using voice recognition software. I have made every reasonable attempt to correct obvious errors, but I expect that there are errors of grammar and possibly content that I did not discover before finalizing the note.      "

## 2021-12-16 NOTE — ASSESSMENT & PLAN NOTE
Physical exam performed today without any acute concerns, no signs of illness.  She does have stigmata of polycystic ovarian syndrome, including hirsutism, obesity.   Otherwise, heart, lungs, abdomen and remainder of exam without concerns. Blood pressure is essentially normal.

## 2022-02-16 RX ORDER — THIAMINE MONONITRATE (VIT B1) 100 MG
TABLET ORAL
Qty: 30 TABLET | Refills: 0 | Status: SHIPPED | OUTPATIENT
Start: 2022-02-16 | End: 2022-03-28 | Stop reason: SDUPTHER

## 2022-03-29 RX ORDER — THIAMINE MONONITRATE (VIT B1) 100 MG
100 TABLET ORAL DAILY
Qty: 30 TABLET | Refills: 4 | Status: SHIPPED | OUTPATIENT
Start: 2022-03-29 | End: 2022-07-21

## 2022-04-23 RX ORDER — MULTIVITAMIN
TABLET ORAL
Qty: 30 TABLET | Refills: 11 | Status: SHIPPED | OUTPATIENT
Start: 2022-04-23 | End: 2023-05-18

## 2022-04-30 RX ORDER — DIAPER,BRIEF,ADULT, DISPOSABLE
EACH MISCELLANEOUS
Qty: 98 EACH | Refills: 3 | Status: SHIPPED | OUTPATIENT
Start: 2022-04-30

## 2022-07-21 DIAGNOSIS — G93.41 METABOLIC ENCEPHALOPATHY: ICD-10-CM

## 2022-07-21 DIAGNOSIS — R63.4 WEIGHT LOSS: ICD-10-CM

## 2022-07-21 RX ORDER — METFORMIN HYDROCHLORIDE 500 MG/1
TABLET, EXTENDED RELEASE ORAL
Qty: 90 TABLET | Refills: 3 | Status: SHIPPED | OUTPATIENT
Start: 2022-07-21 | End: 2023-08-21

## 2022-07-21 RX ORDER — THIAMINE MONONITRATE (VIT B1) 100 MG
TABLET ORAL
Qty: 90 TABLET | Refills: 3 | Status: ON HOLD | OUTPATIENT
Start: 2022-07-21 | End: 2023-07-07

## 2022-09-30 ENCOUNTER — OFFICE VISIT (OUTPATIENT)
Dept: MEDICAL GROUP | Facility: CLINIC | Age: 42
End: 2022-09-30
Payer: MEDICARE

## 2022-09-30 ENCOUNTER — RESEARCH ENCOUNTER (OUTPATIENT)
Dept: MEDICAL GROUP | Facility: CLINIC | Age: 42
End: 2022-09-30
Payer: MEDICARE

## 2022-09-30 VITALS — HEIGHT: 61 IN | WEIGHT: 232 LBS | BODY MASS INDEX: 43.8 KG/M2

## 2022-09-30 DIAGNOSIS — Z12.31 ENCOUNTER FOR SCREENING MAMMOGRAM FOR MALIGNANT NEOPLASM OF BREAST: ICD-10-CM

## 2022-09-30 DIAGNOSIS — Z13.79 ENCOUNTER FOR GENETIC SCREENING: ICD-10-CM

## 2022-09-30 DIAGNOSIS — Z00.00 HEALTH CARE MAINTENANCE: ICD-10-CM

## 2022-09-30 DIAGNOSIS — F79 MENTAL DISABILITY: ICD-10-CM

## 2022-09-30 DIAGNOSIS — R56.9 SEIZURE (HCC): ICD-10-CM

## 2022-09-30 DIAGNOSIS — Z12.31 ENCOUNTER FOR SCREENING MAMMOGRAM FOR BREAST CANCER: ICD-10-CM

## 2022-09-30 DIAGNOSIS — E28.2 POLYCYSTIC OVARIES: ICD-10-CM

## 2022-09-30 DIAGNOSIS — E66.01 CLASS 3 SEVERE OBESITY WITH BODY MASS INDEX (BMI) OF 40.0 TO 44.9 IN ADULT, UNSPECIFIED OBESITY TYPE, UNSPECIFIED WHETHER SERIOUS COMORBIDITY PRESENT (HCC): ICD-10-CM

## 2022-09-30 PROCEDURE — 99213 OFFICE O/P EST LOW 20 MIN: CPT | Mod: GE | Performed by: STUDENT IN AN ORGANIZED HEALTH CARE EDUCATION/TRAINING PROGRAM

## 2022-09-30 NOTE — ASSESSMENT & PLAN NOTE
Reports following up with obstetrician, does not know name of obstetrician or the last time she was seen by obstetrician.  If she needs a new referral to OB/GYN will provide.

## 2022-09-30 NOTE — PATIENT INSTRUCTIONS
Please complete the followin-follow-up with neurologist  2-follow-up with OB/GYN  3-complete mammogram  4-complete labs  5-obtain immunization records from [place]

## 2022-09-30 NOTE — RESEARCH NOTE
Legal guardian not present to sign consent forms. Provided contact information to get those signed. Kit ID: [id number] on hold until then.

## 2022-09-30 NOTE — ASSESSMENT & PLAN NOTE
Seen by [facility] 2019  Do not have any records of any other neurologist appointments  Encouraged caregiver and patient to follow-up with neurologist  If she is no under longer under care of neurologist will provide new referral

## 2022-09-30 NOTE — ASSESSMENT & PLAN NOTE
Mammogram: Ordered  Pap: Does not know when last Pap was completed.  Encouraged to follow-up with OB/GYN or can return here for completion of Pap testing.  Immunizations: Reviewed.  Due for hepatitis B and varicella, which is likely already been completed as a child.  Patient was born in [place] and encouraged to obtain records to ensure immunizations are up-to-date.  DEXA: N/A  Colonoscopy: N/A

## 2022-09-30 NOTE — PROGRESS NOTES
"Subjective:     CC: Annual Exam     HPI:   Erin presents today for annual physical exam for group home.  Patient is established with our clinic but new to me.  She does have a complex past medical history including bipolar, PCOS, epilepsy, and mental disability.  She is accompanied by her caregiver who also provided medical history.     She does have a history of PCOS, and does not know when the last time she was seen by an obstetrician.    She does have a history of epilepsy, does not know when the last time she was seen by neurologist.    Denies: Fevers, chills, chest pain, SOB, abdominal pain, N/V, vaginal bleeding discharge, seizures, dysuria, rash, or pain     BRCA Screening:  Family history or medical history of breast, ovarian, tubal or peritoneal cancer: Does not know   BRCA testing: Offered    STI Screening:   Concerns for STI: No concerns     LMP: Does not have menstrual periods (PCOS)    Health Maintence:   Last Pap: Does not know but believes was completed by OBGYN   Mammogram: Ordered   Immunizations: Reviewed   Colonoscopy: N/A   DEXA: N/A    No problems updated.    ROS: See HPI.     Objective:     Exam:  BP (P) 127/88 (BP Location: Left arm, Patient Position: Sitting, BP Cuff Size: Adult)   Pulse (P) 85   Ht 1.549 m (5' 1\")   Wt 105 kg (232 lb)   SpO2 (P) 96%   BMI 43.84 kg/m²  There is no height or weight on file to calculate BMI.    Physical Exam:  General: Pt resting in NAD, cooperative   Skin:  No cyanosis or jaundice   HEENT: NC/AT. EOMI. No conjunctival injection or sclera icterus.   Lungs:  CTAB, good air movement. Non-labored.   Cardiovascular:  S1/S2 RRR   Abdomen:  Abdomen is soft, non-tender, non-distended  Extremities:  No LE edema   CNS:  No gross focal neurologic deficits  Psych: Appropriate mood and affect   MSK: 5/5 strength in upper and lower extremities.     Assessment & Plan:     42 y.o. female with the following -     Problem List Items Addressed This Visit       Seizure (HCC) "     Seen by [facility] 2019  Do not have any records of any other neurologist appointments  Encouraged caregiver and patient to follow-up with neurologist  If she is no under longer under care of neurologist will provide new referral         Polycystic ovary     Reports following up with obstetrician, does not know name of obstetrician or the last time she was seen by obstetrician.  If she needs a new referral to OB/GYN will provide.         Mental disability     Patient here for annual physical exam.  She has a history of intellectual disability and is under care of [facility].  She is established our clinic but new to me, in review she does have a significant past medical history of epilepsy, bipolar, and PCOS.  She appears to be doing well today and physical examination reassuring.         Health care maintenance     Mammogram: Ordered  Pap: Does not know when last Pap was completed.  Encouraged to follow-up with OB/GYN or can return here for completion of Pap testing.  Immunizations: Reviewed.  Due for hepatitis B and varicella, which is likely already been completed as a child.  Patient was born in [place] and encouraged to obtain records to ensure immunizations are up-to-date.  DEXA: N/A  Colonoscopy: N/A          Other Visit Diagnoses       Encounter for screening mammogram for breast cancer        Encounter for screening mammogram for malignant neoplasm of breast        Relevant Orders    MA-SCREENING MAMMO BILAT W/TOMOSYNTHESIS W/CAD    Class 3 severe obesity with body mass index (BMI) of 40.0 to 44.9 in adult, unspecified obesity type, unspecified whether serious comorbidity present (HCC)        Relevant Orders    Comp Metabolic Panel    TSH WITH REFLEX TO FT4    HEMOGLOBIN A1C    Lipid Profile    Encounter for genetic screening        Relevant Orders    Referral to Genetic Research Studies            Please note that this dictation was created using voice recognition software. I have made every  reasonable attempt to correct obvious errors, but I expect that there are errors of grammar and possibly content that I did not discover before finalizing the note.

## 2022-09-30 NOTE — ASSESSMENT & PLAN NOTE
Patient here for annual physical exam.  She has a history of intellectual disability and is under care of [facility].  She is established our clinic but new to me, in review she does have a significant past medical history of epilepsy, bipolar, and PCOS.  She appears to be doing well today and physical examination reassuring.

## 2022-10-07 ENCOUNTER — HOSPITAL ENCOUNTER (OUTPATIENT)
Dept: LAB | Facility: MEDICAL CENTER | Age: 42
End: 2022-10-07
Attending: STUDENT IN AN ORGANIZED HEALTH CARE EDUCATION/TRAINING PROGRAM
Payer: MEDICARE

## 2022-10-07 DIAGNOSIS — E66.01 CLASS 3 SEVERE OBESITY WITH BODY MASS INDEX (BMI) OF 40.0 TO 44.9 IN ADULT, UNSPECIFIED OBESITY TYPE, UNSPECIFIED WHETHER SERIOUS COMORBIDITY PRESENT (HCC): ICD-10-CM

## 2022-10-07 LAB
ALBUMIN SERPL BCP-MCNC: 4.3 G/DL (ref 3.2–4.9)
ALBUMIN/GLOB SERPL: 1.3 G/DL
ALP SERPL-CCNC: 70 U/L (ref 30–99)
ALT SERPL-CCNC: 14 U/L (ref 2–50)
ANION GAP SERPL CALC-SCNC: 12 MMOL/L (ref 7–16)
AST SERPL-CCNC: 17 U/L (ref 12–45)
BILIRUB SERPL-MCNC: 0.6 MG/DL (ref 0.1–1.5)
BUN SERPL-MCNC: 12 MG/DL (ref 8–22)
CALCIUM SERPL-MCNC: 9.4 MG/DL (ref 8.5–10.5)
CHLORIDE SERPL-SCNC: 103 MMOL/L (ref 96–112)
CHOLEST SERPL-MCNC: 147 MG/DL (ref 100–199)
CO2 SERPL-SCNC: 24 MMOL/L (ref 20–33)
CREAT SERPL-MCNC: 0.82 MG/DL (ref 0.5–1.4)
EST. AVERAGE GLUCOSE BLD GHB EST-MCNC: 120 MG/DL
FASTING STATUS PATIENT QL REPORTED: NORMAL
GFR SERPLBLD CREATININE-BSD FMLA CKD-EPI: 90 ML/MIN/1.73 M 2
GLOBULIN SER CALC-MCNC: 3.3 G/DL (ref 1.9–3.5)
GLUCOSE SERPL-MCNC: 88 MG/DL (ref 65–99)
HBA1C MFR BLD: 5.8 % (ref 4–5.6)
HDLC SERPL-MCNC: 51 MG/DL
LDLC SERPL CALC-MCNC: 79 MG/DL
POTASSIUM SERPL-SCNC: 4.7 MMOL/L (ref 3.6–5.5)
PROT SERPL-MCNC: 7.6 G/DL (ref 6–8.2)
SODIUM SERPL-SCNC: 139 MMOL/L (ref 135–145)
TRIGL SERPL-MCNC: 85 MG/DL (ref 0–149)
TSH SERPL DL<=0.005 MIU/L-ACNC: 0.94 UIU/ML (ref 0.38–5.33)

## 2022-10-07 PROCEDURE — 36415 COLL VENOUS BLD VENIPUNCTURE: CPT

## 2022-10-07 PROCEDURE — 80061 LIPID PANEL: CPT

## 2022-10-07 PROCEDURE — 83036 HEMOGLOBIN GLYCOSYLATED A1C: CPT | Mod: GA

## 2022-10-07 PROCEDURE — 84443 ASSAY THYROID STIM HORMONE: CPT | Mod: GA

## 2022-10-07 PROCEDURE — 80053 COMPREHEN METABOLIC PANEL: CPT

## 2022-10-11 ENCOUNTER — TELEPHONE (OUTPATIENT)
Dept: MEDICAL GROUP | Facility: CLINIC | Age: 42
End: 2022-10-11

## 2022-10-11 NOTE — TELEPHONE ENCOUNTER
Caller Name: Erin Johnson   Call Back Number: [phone] (home) [phone] (work)         How would the patient prefer to be contacted with a response: Phone call OK to leave a detailed message    Order.   Patient called requesting an order for Mammogram to get done in Dec.

## 2022-10-25 ENCOUNTER — OFFICE VISIT (OUTPATIENT)
Dept: MEDICAL GROUP | Facility: CLINIC | Age: 42
End: 2022-10-25
Payer: MEDICARE

## 2022-10-25 VITALS — BODY MASS INDEX: 43.8 KG/M2 | HEIGHT: 61 IN | WEIGHT: 232 LBS

## 2022-10-25 DIAGNOSIS — R73.03 PREDIABETES: ICD-10-CM

## 2022-10-25 PROCEDURE — 99213 OFFICE O/P EST LOW 20 MIN: CPT | Mod: GE | Performed by: STUDENT IN AN ORGANIZED HEALTH CARE EDUCATION/TRAINING PROGRAM

## 2022-10-25 NOTE — ASSESSMENT & PLAN NOTE
A1c 5.8  Patient is currently on metformin 500 mg daily  Discussed option of increasing dosage of metformin or rechecking in 3 months.  Patient and caregiver in agreement to recheck A1c in 3 months.  At that time we will consider increasing dosage of metformin. Discussed healthy diet and exercise.

## 2022-11-03 ENCOUNTER — PATIENT MESSAGE (OUTPATIENT)
Dept: HEALTH INFORMATION MANAGEMENT | Facility: OTHER | Age: 42
End: 2022-11-03

## 2022-11-28 ENCOUNTER — TELEPHONE (OUTPATIENT)
Dept: MEDICAL GROUP | Facility: OTHER | Age: 42
End: 2022-11-28
Payer: MEDICARE

## 2022-11-28 DIAGNOSIS — Z12.31 ENCOUNTER FOR SCREENING MAMMOGRAM FOR MALIGNANT NEOPLASM OF BREAST: ICD-10-CM

## 2022-12-22 ENCOUNTER — OFFICE VISIT (OUTPATIENT)
Dept: MEDICAL GROUP | Facility: CLINIC | Age: 42
End: 2022-12-22
Payer: MEDICARE

## 2022-12-22 VITALS
OXYGEN SATURATION: 99 % | HEIGHT: 61 IN | WEIGHT: 229 LBS | DIASTOLIC BLOOD PRESSURE: 80 MMHG | HEART RATE: 87 BPM | RESPIRATION RATE: 16 BRPM | TEMPERATURE: 97 F | BODY MASS INDEX: 43.23 KG/M2 | SYSTOLIC BLOOD PRESSURE: 120 MMHG

## 2022-12-22 DIAGNOSIS — Z23 NEED FOR VACCINATION: ICD-10-CM

## 2022-12-22 DIAGNOSIS — R73.03 PREDIABETES: ICD-10-CM

## 2022-12-22 DIAGNOSIS — E28.2 POLYCYSTIC OVARIES: ICD-10-CM

## 2022-12-22 PROCEDURE — G0008 ADMIN INFLUENZA VIRUS VAC: HCPCS

## 2022-12-22 PROCEDURE — 99213 OFFICE O/P EST LOW 20 MIN: CPT | Mod: 25,GC

## 2022-12-22 PROCEDURE — 90686 IIV4 VACC NO PRSV 0.5 ML IM: CPT

## 2022-12-22 RX ORDER — THIAMINE MONONITRATE (VIT B1) 100 MG
TABLET ORAL
COMMUNITY
Start: 2022-12-15 | End: 2022-12-22

## 2022-12-22 ASSESSMENT — PATIENT HEALTH QUESTIONNAIRE - PHQ9: CLINICAL INTERPRETATION OF PHQ2 SCORE: 0

## 2022-12-22 NOTE — ASSESSMENT & PLAN NOTE
Patient has history of polycystic ovarian syndrome.  Unknown date of last Pap.  Currently on metformin 500 mg daily.    Plan  - Referral to OB/GYN for evaluation of polycystic ovarian syndrome and well woman's exam.  - Continue metformin 500 mg daily

## 2022-12-22 NOTE — PROGRESS NOTES
CC:Diagnoses of Need for vaccination, Prediabetes, and Polycystic ovary were pertinent to this visit.    HISTORY OF PRESENT ILLNESS: Patient is a 42 y.o. female established patient who presents today to follow-up on prediabetes, flu shot and referral to gynecology for evaluation of polycystic ovarian syndrome and well woman's check.    Patient Active Problem List    Diagnosis Date Noted    Prediabetes 10/25/2022    Health care maintenance 09/30/2022    Mental disability 12/16/2021    Metabolic encephalopathy 03/06/2019    Stroke (cerebrum) ([facility]) 03/06/2019    Parkinson disease ([facility]) 07/17/2018    Seizure ([facility]) 07/17/2018    Weight loss 07/17/2018    Polycystic ovary 07/17/2018    Cognitive decline 07/17/2018    Partial epilepsy with impairment of consciousness, intractable ([facility]) 08/12/2014    Bipolar 1 disorder, depressed, moderate ([facility]) 08/12/2014    Other specified noninflammatory disorder of vulva and perineum 02/20/2014      Allergies:Acetaminophen, Other misc, Penicillins, and Shellfish allergy    Current Outpatient Medications   Medication Sig Dispense Refill    metFORMIN ER (GLUCOPHAGE XR) 500 MG TABLET SR 24 HR TAKE 1 TABLET BY MOUTH ONCE DAILY FOR PRE-DIABETES. 90 Tablet 3    thiamine (VITAMIN B-1) 100 MG Tab TAKE 1 TABLET BY MOUTH ONCE DAILY. 90 Tablet 3    Incontinence Supply Disposable (PREVAIL SUPER PLUS XLARGE) Misc WEAR DAILY, CHANGE AS NEEDED. 98 Each 3    Multiple Vitamin (MULTIVITAMIN) Tab TAKE 1 TABLET BY MOUTH ONCE DAILY. 30 Tablet 11    lamoTRIgine (LAMICTAL) 25 MG Tab Take 1 Tab by mouth every day. 90 Tab 1    carbidopa-levodopa (SINEMET)  MG Tab Take 1 Tab by mouth 3 times a day. 270 Tab 1    levETIRAcetam (KEPPRA) 500 MG Tab Take 1 Tab by mouth 3 times a day. 270 Tab 1    ziprasidone (GEODON) 60 MG Cap Take 60 mg by mouth every evening.      melatonin 5 mg Tab Take 1 Tablet by mouth at bedtime as needed (insomnia). (Patient not taking: Reported on 12/22/2022) 30 Tablet 3     No  "current facility-administered medications for this visit.     Social History     Tobacco Use    Smoking status: Former     Types: Cigarettes    Smokeless tobacco: Never   Vaping Use    Vaping Use: Never used   Substance Use Topics    Alcohol use: No    Drug use: No     Social History     Social History Narrative    Not on file       History reviewed. No pertinent family history.    Exam:    /80 (BP Location: Left arm, Patient Position: Sitting, BP Cuff Size: Adult)   Pulse 87   Temp 36.1 °C (97 °F) (Temporal)   Resp 16   Ht 1.549 m (5' 1\")   Wt 104 kg (229 lb)   SpO2 99%  Body mass index is 43.27 kg/m².    General:  Well nourished, well developed female in NAD  HENT: Atraumatic, normocephalic  EYES: Extraocular movements intact, pupils equal and reactive to light  NECK: Supple, FROM  CHEST: No deformities, Equal chest expansion  RESP: Unlabored, no stridor or audible wheeze  ABD: Non-Distended  Extremities: No Clubbing, Cyanosis, or Edema  Skin: Warm/dry, without rashes  Neuro: A/O x 4, CN 2-12 Grossly intact, Motor/sensory grossly intact  Psych: Normal behavior, normal affect    LABS: Results reviewed and discussed with the patient, questions answered.    Assessment and Plan:    Prediabetes  Patient presents today for follow-up on her prediabetes.  Patient currently taking metformin 500 mg daily.  She is making healthy choices regarding meals and is exercising on a stationary bike.  Last A1c in October was 5.6, today's A1c is 5.4.    Plan  - Continue metformin 500 mg daily  - Follow-up appointment in 3 months to recheck A1c  - Encourage patient to continue making healthy choices regarding meals and to continue to exercise.    Polycystic ovary  Patient has history of polycystic ovarian syndrome.  Unknown date of last Pap.  Currently on metformin 500 mg daily.    Plan  - Referral to OB/GYN for evaluation of polycystic ovarian syndrome and well woman's exam.  - Continue metformin 500 mg daily    Need for " Vaccination  Patient will receive flu vaccine today    Return in about 3 months (around 3/22/2023).    Alecia Hull MD PGY1    Please note that this dictation was created using voice recognition software. I have worked with consultants from the vendor as well as technical experts from [facility] to optimize the interface. I have made every reasonable attempt to correct obvious errors, but I expect that there are errors of grammar and possibly content that I did not discover before finalizing the note.

## 2022-12-22 NOTE — ASSESSMENT & PLAN NOTE
Patient presents today for follow-up on her prediabetes.  Patient currently taking metformin 500 mg daily.  She is making healthy choices regarding meals and is exercising on a stationary bike.  Last A1c in October was 5.6, today's A1c is 5.4.    Plan  - Continue metformin 500 mg daily  - Follow-up appointment in 3 months to recheck A1c  - Encourage patient to continue making healthy choices regarding meals and to continue to exercise.

## 2023-03-15 ENCOUNTER — OFFICE VISIT (OUTPATIENT)
Dept: MEDICAL GROUP | Facility: CLINIC | Age: 43
End: 2023-03-15
Payer: MEDICARE

## 2023-03-15 VITALS
WEIGHT: 225 LBS | HEART RATE: 90 BPM | OXYGEN SATURATION: 98 % | SYSTOLIC BLOOD PRESSURE: 118 MMHG | DIASTOLIC BLOOD PRESSURE: 70 MMHG | TEMPERATURE: 97.8 F | BODY MASS INDEX: 42.51 KG/M2

## 2023-03-15 DIAGNOSIS — F31.32 BIPOLAR 1 DISORDER, DEPRESSED, MODERATE (HCC): ICD-10-CM

## 2023-03-15 DIAGNOSIS — R41.89 COGNITIVE DECLINE: ICD-10-CM

## 2023-03-15 DIAGNOSIS — R56.9 SEIZURE (HCC): ICD-10-CM

## 2023-03-15 DIAGNOSIS — F79 MENTAL DISABILITY: ICD-10-CM

## 2023-03-15 DIAGNOSIS — G40.219 PARTIAL EPILEPSY WITH IMPAIRMENT OF CONSCIOUSNESS, INTRACTABLE (HCC): ICD-10-CM

## 2023-03-15 DIAGNOSIS — I63.9 CEREBROVASCULAR ACCIDENT (CVA), UNSPECIFIED MECHANISM (HCC): ICD-10-CM

## 2023-03-15 PROCEDURE — 99213 OFFICE O/P EST LOW 20 MIN: CPT | Mod: GE

## 2023-03-15 RX ORDER — THIAMINE MONONITRATE (VIT B1) 100 MG
100 TABLET ORAL DAILY
COMMUNITY
Start: 2023-03-13 | End: 2023-08-21

## 2023-03-15 NOTE — PROGRESS NOTES
CC:Diagnoses of Bipolar 1 disorder, depressed, moderate (HCC), Partial epilepsy with impairment of consciousness, intractable (HCC), Seizure (HCC), Mental disability, Cognitive decline, and Cerebrovascular accident (CVA), unspecified mechanism ([facility]) were pertinent to this visit.    HISTORY OF PRESENT ILLNESS: Patient is a 42 y.o. female established patient who presents today to fill out RTC paperwork medical necessity for home .     Problem   Mental Disability    Patient presents to clinic today with her caregiver to have RTC private home  paperwork completed due to medical necessity.  Patient has no other needs today.  All medications up-to-date with adequate refills.          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/06/2019    Parkinson disease ([facility]) 07/17/2018    Seizure ([facility]) 07/17/2018    Weight loss 07/17/2018    Polycystic ovary 07/17/2018    Cognitive decline 07/17/2018    Partial epilepsy with impairment of consciousness, intractable ([facility]) 08/12/2014    Bipolar 1 disorder, depressed, moderate (HCC) 08/12/2014    Other specified noninflammatory disorder of vulva and perineum 02/20/2014        Allergies:Acetaminophen, Other misc, Penicillins, and Shellfish allergy    Current Outpatient Medications   Medication Sig Dispense Refill    thiamine (VITAMIN B-1) 100 MG Tab       metFORMIN ER (GLUCOPHAGE XR) 500 MG TABLET SR 24 HR TAKE 1 TABLET BY MOUTH ONCE DAILY FOR PRE-DIABETES. 90 Tablet 3    thiamine (VITAMIN B-1) 100 MG Tab TAKE 1 TABLET BY MOUTH ONCE DAILY. 90 Tablet 3    Incontinence Supply Disposable (PREVAIL SUPER PLUS XLARGE) Misc WEAR DAILY, CHANGE AS NEEDED. 98 Each 3    Multiple Vitamin (MULTIVITAMIN) Tab TAKE 1 TABLET BY MOUTH ONCE DAILY. 30 Tablet 11    lamoTRIgine (LAMICTAL) 25 MG Tab Take 1 Tab by mouth every  day. 90 Tab 1    carbidopa-levodopa (SINEMET)  MG Tab Take 1 Tab by mouth 3 times a day. 270 Tab 1    levETIRAcetam (KEPPRA) 500 MG Tab Take 1 Tab by mouth 3 times a day. 270 Tab 1    ziprasidone (GEODON) 60 MG Cap Take 60 mg by mouth every evening.      melatonin 5 mg Tab Take 1 Tablet by mouth at bedtime as needed (insomnia). (Patient not taking: Reported on 12/22/2022) 30 Tablet 3     No current facility-administered medications for this visit.       Social History     Tobacco Use    Smoking status: Former     Types: Cigarettes    Smokeless tobacco: Never   Vaping Use    Vaping Use: Never used   Substance Use Topics    Alcohol use: No    Drug use: No     Social History     Social History Narrative    Not on file       History reviewed. No pertinent family history.    Exam:    /70 (BP Location: Left arm, Patient Position: Sitting, BP Cuff Size: Adult)   Pulse 90   Temp 36.6 °C (97.8 °F) (Temporal)   Wt 102 kg (225 lb)   SpO2 98%  Body mass index is 42.51 kg/m².    General:  Well nourished, well developed female in NAD  HENT: Atraumatic, normocephalic  EYES: Extraocular movements intact, pupils equal and reactive to light  NECK: Supple, FROM  CHEST: No deformities, Equal chest expansion  RESP: Unlabored, no stridor or audible wheeze  ABD: Non-Distended  Extremities: No Clubbing, Cyanosis, or Edema  Skin: Warm/dry, without rashes  Neuro: A/O x 4, CN 2-12 Grossly intact, Motor/sensory grossly intact  Psych: Normal behavior, normal affect    LABS: Results reviewed and discussed with the patient, questions answered.    Mental disability  Completed and signed RTC paperwork for patient.  Medical necessity requiring private home pickup include epilepsy, mental disability, cognitive decline, stroke and bipolar disorder.    Return if symptoms worsen or fail to improve.    Alecia Hull MD PGY1    Please note that this dictation was created using voice recognition software. I have worked with  consultants from the vendor as well as technical experts from [facility] to optimize the interface. I have made every reasonable attempt to correct obvious errors, but I expect that there are errors of grammar and possibly content that I did not discover before finalizing the note.

## 2023-03-15 NOTE — ASSESSMENT & PLAN NOTE
Completed and signed RTC paperwork for patient.  Medical necessity requiring private home pickup include epilepsy, mental disability, cognitive decline, stroke and bipolar disorder.

## 2023-04-01 ENCOUNTER — PATIENT MESSAGE (OUTPATIENT)
Dept: MEDICAL GROUP | Facility: CLINIC | Age: 43
End: 2023-04-01
Payer: MEDICARE

## 2023-05-18 RX ORDER — MULTIVITAMIN
TABLET ORAL
Qty: 30 TABLET | Refills: 0 | Status: SHIPPED | OUTPATIENT
Start: 2023-05-18 | End: 2023-06-17

## 2023-06-17 RX ORDER — MULTIVITAMIN
TABLET ORAL
Qty: 30 TABLET | Refills: 0 | Status: SHIPPED | OUTPATIENT
Start: 2023-06-17 | End: 2023-07-21 | Stop reason: SDUPTHER

## 2023-07-04 ENCOUNTER — OFFICE VISIT (OUTPATIENT)
Dept: URGENT CARE | Facility: PHYSICIAN GROUP | Age: 43
End: 2023-07-04
Payer: MEDICARE

## 2023-07-04 ENCOUNTER — HOSPITAL ENCOUNTER (EMERGENCY)
Facility: MEDICAL CENTER | Age: 43
DRG: 200 | End: 2023-07-04
Attending: EMERGENCY MEDICINE
Payer: MEDICARE

## 2023-07-04 ENCOUNTER — APPOINTMENT (OUTPATIENT)
Dept: RADIOLOGY | Facility: MEDICAL CENTER | Age: 43
DRG: 200 | End: 2023-07-04
Attending: EMERGENCY MEDICINE
Payer: MEDICARE

## 2023-07-04 VITALS
HEART RATE: 98 BPM | WEIGHT: 220 LBS | SYSTOLIC BLOOD PRESSURE: 123 MMHG | RESPIRATION RATE: 18 BRPM | DIASTOLIC BLOOD PRESSURE: 71 MMHG | TEMPERATURE: 98.2 F | HEIGHT: 60 IN | BODY MASS INDEX: 43.19 KG/M2 | OXYGEN SATURATION: 92 %

## 2023-07-04 VITALS
HEIGHT: 60 IN | OXYGEN SATURATION: 94 % | SYSTOLIC BLOOD PRESSURE: 128 MMHG | TEMPERATURE: 97 F | HEART RATE: 114 BPM | BODY MASS INDEX: 43.19 KG/M2 | RESPIRATION RATE: 18 BRPM | WEIGHT: 220 LBS | DIASTOLIC BLOOD PRESSURE: 64 MMHG

## 2023-07-04 DIAGNOSIS — R00.0 TACHYCARDIA: ICD-10-CM

## 2023-07-04 DIAGNOSIS — R07.81 PLEURITIC CHEST PAIN: ICD-10-CM

## 2023-07-04 DIAGNOSIS — J94.2 HEMOTHORAX: ICD-10-CM

## 2023-07-04 DIAGNOSIS — S22.41XA CLOSED FRACTURE OF MULTIPLE RIBS OF RIGHT SIDE, INITIAL ENCOUNTER: ICD-10-CM

## 2023-07-04 LAB
ALBUMIN SERPL BCP-MCNC: 3.8 G/DL (ref 3.2–4.9)
ALBUMIN/GLOB SERPL: 1.4 G/DL
ALP SERPL-CCNC: 58 U/L (ref 30–99)
ALT SERPL-CCNC: 11 U/L (ref 2–50)
ANION GAP SERPL CALC-SCNC: 13 MMOL/L (ref 7–16)
AST SERPL-CCNC: 12 U/L (ref 12–45)
BASOPHILS # BLD AUTO: 0.3 % (ref 0–1.8)
BASOPHILS # BLD: 0.05 K/UL (ref 0–0.12)
BILIRUB SERPL-MCNC: 0.4 MG/DL (ref 0.1–1.5)
BUN SERPL-MCNC: 13 MG/DL (ref 8–22)
CALCIUM ALBUM COR SERPL-MCNC: 8.7 MG/DL (ref 8.5–10.5)
CALCIUM SERPL-MCNC: 8.5 MG/DL (ref 8.5–10.5)
CHLORIDE SERPL-SCNC: 103 MMOL/L (ref 96–112)
CO2 SERPL-SCNC: 21 MMOL/L (ref 20–33)
CREAT SERPL-MCNC: 0.69 MG/DL (ref 0.5–1.4)
D DIMER PPP IA.FEU-MCNC: 1.46 UG/ML (FEU) (ref 0–0.5)
EKG IMPRESSION: NORMAL
EOSINOPHIL # BLD AUTO: 0 K/UL (ref 0–0.51)
EOSINOPHIL NFR BLD: 0 % (ref 0–6.9)
ERYTHROCYTE [DISTWIDTH] IN BLOOD BY AUTOMATED COUNT: 46 FL (ref 35.9–50)
GFR SERPLBLD CREATININE-BSD FMLA CKD-EPI: 109 ML/MIN/1.73 M 2
GLOBULIN SER CALC-MCNC: 2.8 G/DL (ref 1.9–3.5)
GLUCOSE SERPL-MCNC: 141 MG/DL (ref 65–99)
HCG SERPL QL: NEGATIVE
HCT VFR BLD AUTO: 40.2 % (ref 37–47)
HGB BLD-MCNC: 12.9 G/DL (ref 12–16)
IMM GRANULOCYTES # BLD AUTO: 0.08 K/UL (ref 0–0.11)
IMM GRANULOCYTES NFR BLD AUTO: 0.5 % (ref 0–0.9)
LYMPHOCYTES # BLD AUTO: 1.31 K/UL (ref 1–4.8)
LYMPHOCYTES NFR BLD: 8.2 % (ref 22–41)
MCH RBC QN AUTO: 29.6 PG (ref 27–33)
MCHC RBC AUTO-ENTMCNC: 32.1 G/DL (ref 32.2–35.5)
MCV RBC AUTO: 92.2 FL (ref 81.4–97.8)
MONOCYTES # BLD AUTO: 1.23 K/UL (ref 0–0.85)
MONOCYTES NFR BLD AUTO: 7.7 % (ref 0–13.4)
NEUTROPHILS # BLD AUTO: 13.24 K/UL (ref 1.82–7.42)
NEUTROPHILS NFR BLD: 83.3 % (ref 44–72)
NRBC # BLD AUTO: 0 K/UL
NRBC BLD-RTO: 0 /100 WBC (ref 0–0.2)
PLATELET # BLD AUTO: 263 K/UL (ref 164–446)
PMV BLD AUTO: 10.2 FL (ref 9–12.9)
POTASSIUM SERPL-SCNC: 3.8 MMOL/L (ref 3.6–5.5)
PROT SERPL-MCNC: 6.6 G/DL (ref 6–8.2)
RBC # BLD AUTO: 4.36 M/UL (ref 4.2–5.4)
SODIUM SERPL-SCNC: 137 MMOL/L (ref 135–145)
TROPONIN T SERPL-MCNC: <6 NG/L (ref 6–19)
TROPONIN T SERPL-MCNC: <6 NG/L (ref 6–19)
WBC # BLD AUTO: 15.9 K/UL (ref 4.8–10.8)

## 2023-07-04 PROCEDURE — 93005 ELECTROCARDIOGRAM TRACING: CPT

## 2023-07-04 PROCEDURE — 93005 ELECTROCARDIOGRAM TRACING: CPT | Performed by: EMERGENCY MEDICINE

## 2023-07-04 PROCEDURE — 99205 OFFICE O/P NEW HI 60 MIN: CPT | Performed by: PHYSICIAN ASSISTANT

## 2023-07-04 PROCEDURE — 700117 HCHG RX CONTRAST REV CODE 255: Mod: UD | Performed by: EMERGENCY MEDICINE

## 2023-07-04 PROCEDURE — 71275 CT ANGIOGRAPHY CHEST: CPT

## 2023-07-04 PROCEDURE — 3078F DIAST BP <80 MM HG: CPT | Performed by: PHYSICIAN ASSISTANT

## 2023-07-04 PROCEDURE — 96374 THER/PROPH/DIAG INJ IV PUSH: CPT

## 2023-07-04 PROCEDURE — 71045 X-RAY EXAM CHEST 1 VIEW: CPT

## 2023-07-04 PROCEDURE — 3074F SYST BP LT 130 MM HG: CPT | Performed by: PHYSICIAN ASSISTANT

## 2023-07-04 PROCEDURE — 36415 COLL VENOUS BLD VENIPUNCTURE: CPT

## 2023-07-04 PROCEDURE — 700111 HCHG RX REV CODE 636 W/ 250 OVERRIDE (IP): Mod: UD | Performed by: EMERGENCY MEDICINE

## 2023-07-04 PROCEDURE — 700105 HCHG RX REV CODE 258: Mod: UD | Performed by: EMERGENCY MEDICINE

## 2023-07-04 PROCEDURE — 84703 CHORIONIC GONADOTROPIN ASSAY: CPT

## 2023-07-04 PROCEDURE — 80053 COMPREHEN METABOLIC PANEL: CPT

## 2023-07-04 PROCEDURE — 84484 ASSAY OF TROPONIN QUANT: CPT

## 2023-07-04 PROCEDURE — 85379 FIBRIN DEGRADATION QUANT: CPT

## 2023-07-04 PROCEDURE — 85025 COMPLETE CBC W/AUTO DIFF WBC: CPT

## 2023-07-04 PROCEDURE — 99285 EMERGENCY DEPT VISIT HI MDM: CPT

## 2023-07-04 RX ORDER — KETOROLAC TROMETHAMINE 30 MG/ML
15 INJECTION, SOLUTION INTRAMUSCULAR; INTRAVENOUS ONCE
Status: COMPLETED | OUTPATIENT
Start: 2023-07-04 | End: 2023-07-04

## 2023-07-04 RX ORDER — SODIUM CHLORIDE 9 MG/ML
1000 INJECTION, SOLUTION INTRAVENOUS ONCE
Status: COMPLETED | OUTPATIENT
Start: 2023-07-04 | End: 2023-07-04

## 2023-07-04 RX ADMIN — SODIUM CHLORIDE 1000 ML: 9 INJECTION, SOLUTION INTRAVENOUS at 13:47

## 2023-07-04 RX ADMIN — IOHEXOL 75 ML: 350 INJECTION, SOLUTION INTRAVENOUS at 15:29

## 2023-07-04 RX ADMIN — KETOROLAC TROMETHAMINE 15 MG: 30 INJECTION, SOLUTION INTRAMUSCULAR; INTRAVENOUS at 13:46

## 2023-07-04 ASSESSMENT — ENCOUNTER SYMPTOMS
NAUSEA: 0
VOMITING: 0
CHILLS: 0
PALPITATIONS: 0
SHORTNESS OF BREATH: 1
FEVER: 0

## 2023-07-04 NOTE — ED NOTES
Assist RN: Pt placed on cardiac monitor, pulse ox, and automatic BP. VSS, saturating above 90% on RA, ST in the 100s. Call light within reach and chart up for ERP.

## 2023-07-04 NOTE — PROGRESS NOTES
Subjective:   Erin Santiago is a 42 y.o. female who presents for Flank Pain (X 1 Day Rt side flank pain, low abdominal pain)        Patient presents with her caretakers today who contribute to the history.  Patient presents with sudden onset of right-sided chest pain that began yesterday.  Pain is significant and has been worsening.  Pain is constant but exacerbated by breathing.  Nothing alleviates her pain.  She denies nausea, vomiting, fevers, chills, difficulty breathing, cough, hemoptysis, leg pain, leg swelling, history of VTE.  Bowel movements have been normal.  She has not started any new medications recently.  She is not had any recent falls or injury to the area.  She has not noticed any rashes in the area.  Denies similar symptoms in the past.  She is not taking any medications for her symptoms.      Review of Systems   Constitutional:  Negative for chills and fever.   Respiratory:  Positive for shortness of breath.    Cardiovascular:  Positive for chest pain. Negative for palpitations and leg swelling.   Gastrointestinal:  Negative for nausea and vomiting.       PMH:  has a past medical history of Anxiety, Depression, Diabetes, Epilepsy (HCC), Mental disability, Sleep apnea, Snoring, and Unspecified urinary incontinence.  MEDS:   Current Outpatient Medications:   •  Multiple Vitamin (MULTIVITAMIN) Tab, TAKE 1 TABLET BY MOUTH ONCE DAILY., Disp: 30 Tablet, Rfl: 0  •  thiamine (VITAMIN B-1) 100 MG Tab, , Disp: , Rfl:   •  metFORMIN ER (GLUCOPHAGE XR) 500 MG TABLET SR 24 HR, TAKE 1 TABLET BY MOUTH ONCE DAILY FOR PRE-DIABETES., Disp: 90 Tablet, Rfl: 3  •  thiamine (VITAMIN B-1) 100 MG Tab, TAKE 1 TABLET BY MOUTH ONCE DAILY., Disp: 90 Tablet, Rfl: 3  •  Incontinence Supply Disposable (PREVAIL SUPER PLUS XLARGE) Misc, WEAR DAILY, CHANGE AS NEEDED., Disp: 98 Each, Rfl: 3  •  lamoTRIgine (LAMICTAL) 25 MG Tab, Take 1 Tab by mouth every day., Disp: 90 Tab, Rfl: 1  •  carbidopa-levodopa (SINEMET)  MG  Tab, Take 1 Tab by mouth 3 times a day., Disp: 270 Tab, Rfl: 1  •  levETIRAcetam (KEPPRA) 500 MG Tab, Take 1 Tab by mouth 3 times a day., Disp: 270 Tab, Rfl: 1  •  ziprasidone (GEODON) 60 MG Cap, Take 60 mg by mouth every evening., Disp: , Rfl:   •  melatonin 5 mg Tab, Take 1 Tablet by mouth at bedtime as needed (insomnia). (Patient not taking: Reported on 12/22/2022), Disp: 30 Tablet, Rfl: 3  ALLERGIES:   Allergies   Allergen Reactions   • Acetaminophen      Unknown per patient and caregiver   • Other Misc      bees   • Penicillins Hives     Per patien   • Shellfish Allergy Hives     Get hives per patient     SURGHX:   Past Surgical History:   Procedure Laterality Date   • LESION EXCISION GENERAL  2/20/2014    Performed by Lemuel Fernandez M.D. at SURGERY SAME DAY [facility] ORS     SOCHX:  reports that she has quit smoking. Her smoking use included cigarettes. She has never used smokeless tobacco. She reports that she does not drink alcohol and does not use drugs.  FH: Family history was reviewed, no pertinent findings to report   Objective:   /64 (BP Location: Left arm, Patient Position: Sitting, BP Cuff Size: Adult)   Pulse (!) 114   Temp 36.1 °C (97 °F) (Temporal)   Resp 18   Ht 1.524 m (5')   Wt 99.8 kg (220 lb)   SpO2 94%   BMI 42.97 kg/m²   Physical Exam  Vitals reviewed.   Constitutional:       General: She is not in acute distress.     Appearance: Normal appearance. She is well-developed. She is not toxic-appearing.   HENT:      Head: Normocephalic and atraumatic.      Right Ear: External ear normal.      Left Ear: External ear normal.      Nose: Nose normal.   Cardiovascular:      Rate and Rhythm: Regular rhythm. Tachycardia present.   Pulmonary:      Effort: Pulmonary effort is normal. No respiratory distress.      Breath sounds: No stridor.   Chest:      Comments: Skin of the chest wall is intact and atraumatic.  No rashes or lesions.  Unable to reproduce pain with palpation.  Abdominal:       Comments: Normal active bowel sounds.  Abdomen is soft and nontender to palpation.  No palpable masses organomegaly.  Negative Gonzalez's.  No tenderness McBurney's point.   Musculoskeletal:      Right lower leg: No edema.      Left lower leg: No edema.   Skin:     General: Skin is dry.   Neurological:      Comments: Alert and oriented.    Psychiatric:         Speech: Speech is delayed.         Behavior: Behavior is slowed.         Assessment/Plan:   1. Pleuritic chest pain    2. Tachycardia    Patient presents today with worsening right-sided pleuritic chest pain.  She is tachycardic at 114 and oxygen saturation is 94%.  Recommend additional evaluation at a higher level care to rule out potentially emergent cardiopulmonary etiology.  Patient will go to [facility] ED for further evaluation and management.

## 2023-07-04 NOTE — ED NOTES
Bedside report received from JAROD Mauricio. Pt is A&Ox4 and awake in bed. Pt on cardiac monitor, pulse ox, and automatic BP. VSS, saturating above 90% on RA, SR in the 90s. Pt complains of 0/10 pain at this time. Call light within reach and pt updated on POC.

## 2023-07-04 NOTE — ED PROVIDER NOTES
"ED Provider Note    CHIEF COMPLAINT  Chief Complaint   Patient presents with    Chest Pain     Right sided chest pain radiating to right back \"since yesterday.\" Described as \"sharp.\"    Shortness of Breath     X2 days.        EXTERNAL RECORDS REVIEWED  Urgent care note from prior to arrival, pleuritic chest pain and tachycardia, sent here to exclude emergent cardiopulmonary etiology    HPI/ROS  LIMITATION TO HISTORY   Select: Developmental delay  OUTSIDE HISTORIAN(S):  Caretakers    Erin Santiago is a 42 y.o. female who presents for evaluation of right-sided chest pain, sent by urgent care for evaluation of possible PE.  Pain began today, right lateral chest pain rating to her back, sharp and worse with deep inspiration.  She has been short of breath.  No fever.  No coughing.  No leg pains or swelling.  No history of this pain in the past.  She has a history of developmental delay with anxiety depression diabetes and epilepsy.  Denies any injuries.    PAST MEDICAL HISTORY   has a past medical history of Anxiety, Depression, Diabetes, Epilepsy (HCC), Mental disability, Sleep apnea, Snoring, and Unspecified urinary incontinence.    SURGICAL HISTORY   has a past surgical history that includes lesion excision general (2/20/2014).    FAMILY HISTORY  No family history on file.    SOCIAL HISTORY  Social History     Tobacco Use    Smoking status: Former     Types: Cigarettes    Smokeless tobacco: Never   Vaping Use    Vaping Use: Never used   Substance and Sexual Activity    Alcohol use: No    Drug use: No    Sexual activity: Never       CURRENT MEDICATIONS  Home Medications       Reviewed by Nell Ortiz R.N. (Registered Nurse) on 07/04/23 at 1257  Med List Status: Partial     Medication Last Dose Status   carbidopa-levodopa (SINEMET)  MG Tab  Active   Incontinence Supply Disposable (PREVAIL SUPER PLUS XLARGE) Misc  Active   lamoTRIgine (LAMICTAL) 25 MG Tab  Active   levETIRAcetam (KEPPRA) 500 MG Tab  Active "   melatonin 5 mg Tab  Active   metFORMIN ER (GLUCOPHAGE XR) 500 MG TABLET SR 24 HR  Active   Multiple Vitamin (MULTIVITAMIN) Tab  Active   thiamine (VITAMIN B-1) 100 MG Tab  Active   thiamine (VITAMIN B-1) 100 MG Tab  Active   ziprasidone (GEODON) 60 MG Cap  Active                    ALLERGIES  Allergies   Allergen Reactions    Acetaminophen      Unknown per patient and caregiver    Other Misc      bees    Penicillins Hives     Per patien    Shellfish Allergy Hives     Get hives per patient       PHYSICAL EXAM  VITAL SIGNS: /72   Pulse (!) 108   Temp 36.9 °C (98.4 °F) (Temporal)   Resp (!) 22   Ht 1.524 m (5')   Wt 99.8 kg (220 lb)   SpO2 95%   BMI 42.97 kg/m²    Constitutional: Awake, alert, no acute distress  HENT: Normocephalic, no obvious evidence of acute trauma.  Eyes: No scleral icterus. Normal conjunctiva   Thorax & Lungs: Normal nonlabored respirations.  Some tenderness of the right lateral chest wall with overlying bruising.  I appreciate no wheezing, rhonchi or rales. There is normal air movement.  Upon cardiac ascultation I appreciate a regular heart rhythm and a tachycardic rate  Abdomen: The abdomen is not visibly distended. Upon palpation, I find it to be without tenderness.  No mass appreciated.  Skin: The exposed portions of skin reveal no obvious rash or other abnormalities.  Extremities/Musculoskeletal: No obvious sign of acute trauma. No asymmetric calf tenderness or edema.   Neurologic: Alert & oriented. No focal deficits observed.      DIAGNOSTIC STUDIES / PROCEDURES    LABS  Results for orders placed or performed during the hospital encounter of 07/04/23   CBC with Differential   Result Value Ref Range    WBC 15.9 (H) 4.8 - 10.8 K/uL    RBC 4.36 4.20 - 5.40 M/uL    Hemoglobin 12.9 12.0 - 16.0 g/dL    Hematocrit 40.2 37.0 - 47.0 %    MCV 92.2 81.4 - 97.8 fL    MCH 29.6 27.0 - 33.0 pg    MCHC 32.1 (L) 32.2 - 35.5 g/dL    RDW 46.0 35.9 - 50.0 fL    Platelet Count 263 164 - 446 K/uL     MPV 10.2 9.0 - 12.9 fL    Neutrophils-Polys 83.30 (H) 44.00 - 72.00 %    Lymphocytes 8.20 (L) 22.00 - 41.00 %    Monocytes 7.70 0.00 - 13.40 %    Eosinophils 0.00 0.00 - 6.90 %    Basophils 0.30 0.00 - 1.80 %    Immature Granulocytes 0.50 0.00 - 0.90 %    Nucleated RBC 0.00 0.00 - 0.20 /100 WBC    Neutrophils (Absolute) 13.24 (H) 1.82 - 7.42 K/uL    Lymphs (Absolute) 1.31 1.00 - 4.80 K/uL    Monos (Absolute) 1.23 (H) 0.00 - 0.85 K/uL    Eos (Absolute) 0.00 0.00 - 0.51 K/uL    Baso (Absolute) 0.05 0.00 - 0.12 K/uL    Immature Granulocytes (abs) 0.08 0.00 - 0.11 K/uL    NRBC (Absolute) 0.00 K/uL   Complete Metabolic Panel (CMP)   Result Value Ref Range    Sodium 137 135 - 145 mmol/L    Potassium 3.8 3.6 - 5.5 mmol/L    Chloride 103 96 - 112 mmol/L    Co2 21 20 - 33 mmol/L    Anion Gap 13.0 7.0 - 16.0    Glucose 141 (H) 65 - 99 mg/dL    Bun 13 8 - 22 mg/dL    Creatinine 0.69 0.50 - 1.40 mg/dL    Calcium 8.5 8.5 - 10.5 mg/dL    AST(SGOT) 12 12 - 45 U/L    ALT(SGPT) 11 2 - 50 U/L    Alkaline Phosphatase 58 30 - 99 U/L    Total Bilirubin 0.4 0.1 - 1.5 mg/dL    Albumin 3.8 3.2 - 4.9 g/dL    Total Protein 6.6 6.0 - 8.2 g/dL    Globulin 2.8 1.9 - 3.5 g/dL    A-G Ratio 1.4 g/dL   Troponins NOW   Result Value Ref Range    Troponin T <6 6 - 19 ng/L   Troponins in two (2) hours   Result Value Ref Range    Troponin T <6 6 - 19 ng/L   HCG Qual Serum   Result Value Ref Range    Beta-Hcg Qualitative Serum Negative Negative   D-DIMER   Result Value Ref Range    D-Dimer 1.46 (H) 0.00 - 0.50 ug/mL (FEU)   CORRECTED CALCIUM   Result Value Ref Range    Correct Calcium 8.7 8.5 - 10.5 mg/dL   ESTIMATED GFR   Result Value Ref Range    GFR (CKD-EPI) 109 >60 mL/min/1.73 m 2   EKG   Result Value Ref Range    Report       [facility] Emergency Dept.    Test Date:  2023-07-04  Pt Name:    PHU RICHARDS                  Department: ER  MRN:        [id number]                      Room:        20  Gender:     Female                        Technician: [id number]  :        1980                   Requested By:ER TRIAGE PROTOCOL  Order #:    [id number]                    Reading MD: HUMAIRA MELTON MD    Measurements  Intervals                                Axis  Rate:       106                          P:          50  MN:         145                          QRS:        21  QRSD:       80                           T:          20  QT:         324  QTc:        431    Interpretive Statements  12 Lead EKG interpreted by me to show: -- Rate 106 -- Rhythm: Normal sinus  rhythm -- Axis: Normal -- MN and QRS Intervals: Normal -- T waves: No acute  changes -- ST segments: No acute changes -- Ectopy: None. My impression of  this EKG: Does not indicate acute ischemia at this time. No change 3/6/19  E lectronically Signed On 2023 13:09:14 PDT by HUMAIRA MELTON MD          RADIOLOGY  I have independently interpreted the diagnostic imaging associated with this visit and am waiting the final reading from the radiologist.   My preliminary interpretation is as follows: No pneumothorax no infiltrate  Radiologist interpretation:   CT-CTA CHEST PULMONARY ARTERY W/ RECONS   Final Result      1.  No evidence of pulmonary embolism. Segmental pulmonary emboli cannot be excluded in the bilateral lower lobes related to motion artifact.   2.  Sixth-10th posterior right rib fractures.   3.  Smaller adjacent pleural fluid, possibly hemothorax.   4.  Adjacent consolidation. Atelectasis versus contusion.   5.  Enlargement of the bilateral thyroid glands calcification in the right thyroid gland. Recommend nonemergent, dedicated ultrasound evaluation.            DX-CHEST-PORTABLE (1 VIEW)   Final Result      No acute cardiopulmonary abnormality identified.            COURSE & MEDICAL DECISION MAKING    ED Observation Status? Yes; I am placing the patient in to an observation status due to a diagnostic uncertainty as well as therapeutic intensity. Patient placed  in observation status at 1:03 PM, 7/4/2023.     Observation plan is as follows: Work-up, possible social work consultation, reevaluation and decision on disposition    Upon Reevaluation, the patient's condition has: Improved; and will be discharged.    Patient discharged from ED Observation status at 5:38 PM (Time) 7/4/2023 (Date).     INITIAL ASSESSMENT, COURSE AND PLAN  Care Narrative: 42-year-old developmentally female with pleuritic right-sided chest pain for the past day, sent by urgent care to rule out PE.  Tachycardic.  Exam is otherwise unremarkable.  Blood work and imaging will be obtained, she will be reevaluated  Differential includes: ACS, PE, pneumothorax, dehydration, electrolyte abnormalities, anemia    Troponins negative.  D-dimer comes back elevated.  CT scan is performed which excludes pulmonary embolism but identifies multiple rib fractures and hemothorax.  The patient denies ever falling or being injured.  The group home staff at the bedside report they did not witness her fall and do not know how she could have broken ribs.  At this point I did discuss the case with social work with the specific concerns of nonaccidental trauma.  She spoke with the county guardian, and EPS report has been filed.  Ultimately the guardian agrees to have the patient go back to the group home where she is being discharged to in stable condition    DISPOSITION AND DISCUSSIONS    Discussion of management with other [facility] or appropriate source(s): Social Work        Barriers to care at this time, including but not limited to:  Patient has developmental delay residing in a group home .     Decision tools and prescription drugs considered including, but not limited to: I did consider prescription of opiate analgesics given the rib fractures although with the unclear acuity and her relative comfortable appearance the prescription was not performed.    FINAL DIAGNOSIS  1. Closed fracture of multiple ribs of right side,  initial encounter    2. Hemothorax           Electronically signed by: John Pizarro M.D., 7/4/2023 1:26 PM

## 2023-07-04 NOTE — ED TRIAGE NOTES
"Chief Complaint   Patient presents with    Chest Pain     Right sided chest pain radiating to right back \"since yesterday.\" Described as \"sharp.\"    Shortness of Breath     X2 days.      Pt BIB EMS. Pt resides at a Group Home and has history of mental disability. Pt has history of DM and seizures, denies any cardiac/respiratory history. Pt is A&Ox4 and answers all questions appropriately. Pt is tachypneic at rest. EMS administered 324 ASA, 0.4 nitro, and approximately 500 cc NS.     /72   Pulse (!) 108   Temp 36.9 °C (98.4 °F) (Temporal)   Resp (!) 22   Ht 1.524 m (5')   Wt 99.8 kg (220 lb)   SpO2 95%   BMI 42.97 kg/m²     "

## 2023-07-05 NOTE — DISCHARGE PLANNING
CARLTON has discussed concerns with RIKI regarding Pt broken ribs.     Pt resides at a Group Home- [address]. [place] [address]  Owner is Matilda [phone]    Group Home Owner,  at Bedside and Pt are unable to verify or say how Pt obtained the broken ribs.   Pt is assigned to [facility] [employer]ians [employer]. RIKI spoke to On Call worker Alyson who e mailed guardianship papers. RIKI has placed the papers on file in the Media Tab.   Assigned  is Nay Seals- [phone]   Noelle is aware of Pt ED Visit she is in agreement for Pt to return to [facility] and they will follow up with . Alyson also requested an EPS report be filed. RIKI completed EPS report Case # [id number].  RIKI secured emailed ED Encounter Summary to Alyson with EPS Case Number for follow up.

## 2023-07-05 NOTE — ED NOTES
Provided pt & caretaker with written and verbal instructions regarding follow-up and activity. All questions answered and patient verbalized understanding. Pt ambulated out of unit with steady gait with caretaker back to group home.

## 2023-07-06 NOTE — DISCHARGE PLANNING
MSW received call from APS worker Justyna Echols. She has been assigned to pt's case and will f/u with the guardian.

## 2023-07-07 ENCOUNTER — APPOINTMENT (OUTPATIENT)
Dept: RADIOLOGY | Facility: MEDICAL CENTER | Age: 43
DRG: 200 | End: 2023-07-07
Attending: SURGERY
Payer: MEDICARE

## 2023-07-07 ENCOUNTER — OFFICE VISIT (OUTPATIENT)
Dept: MEDICAL GROUP | Facility: CLINIC | Age: 43
End: 2023-07-07
Payer: MEDICARE

## 2023-07-07 ENCOUNTER — APPOINTMENT (OUTPATIENT)
Dept: RADIOLOGY | Facility: MEDICAL CENTER | Age: 43
DRG: 200 | End: 2023-07-07
Attending: EMERGENCY MEDICINE
Payer: MEDICARE

## 2023-07-07 ENCOUNTER — HOSPITAL ENCOUNTER (INPATIENT)
Facility: MEDICAL CENTER | Age: 43
LOS: 10 days | DRG: 200 | End: 2023-07-17
Attending: EMERGENCY MEDICINE | Admitting: SURGERY
Payer: MEDICARE

## 2023-07-07 VITALS — BODY MASS INDEX: 42.1 KG/M2 | HEIGHT: 61 IN | WEIGHT: 223 LBS

## 2023-07-07 DIAGNOSIS — S22.41XA CLOSED FRACTURE OF MULTIPLE RIBS OF RIGHT SIDE, INITIAL ENCOUNTER: Primary | ICD-10-CM

## 2023-07-07 DIAGNOSIS — R07.81 RIB PAIN: ICD-10-CM

## 2023-07-07 DIAGNOSIS — T14.90XA TRAUMA: ICD-10-CM

## 2023-07-07 DIAGNOSIS — S22.49XA MULTIPLE RIB FRACTURES INVOLVING FOUR OR MORE RIBS: ICD-10-CM

## 2023-07-07 DIAGNOSIS — N39.0 URINARY TRACT INFECTION WITHOUT HEMATURIA, SITE UNSPECIFIED: ICD-10-CM

## 2023-07-07 DIAGNOSIS — J94.2 HEMOPNEUMOTHORAX ON RIGHT: ICD-10-CM

## 2023-07-07 DIAGNOSIS — R07.1 PAINFUL BREATHING: ICD-10-CM

## 2023-07-07 PROBLEM — E66.813 CLASS 3 SEVERE OBESITY IN ADULT (HCC): Status: ACTIVE | Noted: 2023-07-07

## 2023-07-07 PROBLEM — S27.1XXA: Status: ACTIVE | Noted: 2023-07-07

## 2023-07-07 PROBLEM — Z78.9 NO CONTRAINDICATION TO DEEP VEIN THROMBOSIS (DVT) PROPHYLAXIS: Status: ACTIVE | Noted: 2023-07-07

## 2023-07-07 PROBLEM — E66.01 CLASS 3 SEVERE OBESITY IN ADULT (HCC): Status: ACTIVE | Noted: 2023-07-07

## 2023-07-07 LAB
ALBUMIN SERPL BCP-MCNC: 3.9 G/DL (ref 3.2–4.9)
ALBUMIN/GLOB SERPL: 1.3 G/DL
ALP SERPL-CCNC: 55 U/L (ref 30–99)
ALT SERPL-CCNC: 5 U/L (ref 2–50)
ANION GAP SERPL CALC-SCNC: 12 MMOL/L (ref 7–16)
AST SERPL-CCNC: 8 U/L (ref 12–45)
BASOPHILS # BLD AUTO: 0.6 % (ref 0–1.8)
BASOPHILS # BLD: 0.08 K/UL (ref 0–0.12)
BILIRUB SERPL-MCNC: 0.5 MG/DL (ref 0.1–1.5)
BUN SERPL-MCNC: 15 MG/DL (ref 8–22)
CALCIUM ALBUM COR SERPL-MCNC: 9.2 MG/DL (ref 8.5–10.5)
CALCIUM SERPL-MCNC: 9.1 MG/DL (ref 8.5–10.5)
CHLORIDE SERPL-SCNC: 102 MMOL/L (ref 96–112)
CO2 SERPL-SCNC: 25 MMOL/L (ref 20–33)
CREAT SERPL-MCNC: 0.64 MG/DL (ref 0.5–1.4)
EOSINOPHIL # BLD AUTO: 0.18 K/UL (ref 0–0.51)
EOSINOPHIL NFR BLD: 1.2 % (ref 0–6.9)
ERYTHROCYTE [DISTWIDTH] IN BLOOD BY AUTOMATED COUNT: 46 FL (ref 35.9–50)
GFR SERPLBLD CREATININE-BSD FMLA CKD-EPI: 111 ML/MIN/1.73 M 2
GLOBULIN SER CALC-MCNC: 2.9 G/DL (ref 1.9–3.5)
GLUCOSE SERPL-MCNC: 96 MG/DL (ref 65–99)
HCT VFR BLD AUTO: 43.1 % (ref 37–47)
HGB BLD-MCNC: 13.9 G/DL (ref 12–16)
IMM GRANULOCYTES # BLD AUTO: 0.07 K/UL (ref 0–0.11)
IMM GRANULOCYTES NFR BLD AUTO: 0.5 % (ref 0–0.9)
LYMPHOCYTES # BLD AUTO: 2.52 K/UL (ref 1–4.8)
LYMPHOCYTES NFR BLD: 17.4 % (ref 22–41)
MCH RBC QN AUTO: 30 PG (ref 27–33)
MCHC RBC AUTO-ENTMCNC: 32.3 G/DL (ref 32.2–35.5)
MCV RBC AUTO: 93.1 FL (ref 81.4–97.8)
MONOCYTES # BLD AUTO: 1.31 K/UL (ref 0–0.85)
MONOCYTES NFR BLD AUTO: 9 % (ref 0–13.4)
NEUTROPHILS # BLD AUTO: 10.32 K/UL (ref 1.82–7.42)
NEUTROPHILS NFR BLD: 71.3 % (ref 44–72)
NRBC # BLD AUTO: 0 K/UL
NRBC BLD-RTO: 0 /100 WBC (ref 0–0.2)
PLATELET # BLD AUTO: 362 K/UL (ref 164–446)
PMV BLD AUTO: 9.8 FL (ref 9–12.9)
POTASSIUM SERPL-SCNC: 3.9 MMOL/L (ref 3.6–5.5)
PROT SERPL-MCNC: 6.8 G/DL (ref 6–8.2)
RBC # BLD AUTO: 4.63 M/UL (ref 4.2–5.4)
SODIUM SERPL-SCNC: 139 MMOL/L (ref 135–145)
WBC # BLD AUTO: 14.5 K/UL (ref 4.8–10.8)

## 2023-07-07 PROCEDURE — 700102 HCHG RX REV CODE 250 W/ 637 OVERRIDE(OP): Performed by: SURGERY

## 2023-07-07 PROCEDURE — 96374 THER/PROPH/DIAG INJ IV PUSH: CPT

## 2023-07-07 PROCEDURE — 700117 HCHG RX CONTRAST REV CODE 255: Performed by: SURGERY

## 2023-07-07 PROCEDURE — 82962 GLUCOSE BLOOD TEST: CPT

## 2023-07-07 PROCEDURE — 85025 COMPLETE CBC W/AUTO DIFF WBC: CPT

## 2023-07-07 PROCEDURE — 700105 HCHG RX REV CODE 258: Mod: JZ | Performed by: SURGERY

## 2023-07-07 PROCEDURE — 700111 HCHG RX REV CODE 636 W/ 250 OVERRIDE (IP): Mod: JZ,UD | Performed by: EMERGENCY MEDICINE

## 2023-07-07 PROCEDURE — 99285 EMERGENCY DEPT VISIT HI MDM: CPT

## 2023-07-07 PROCEDURE — 32551 INSERTION OF CHEST TUBE: CPT

## 2023-07-07 PROCEDURE — 36415 COLL VENOUS BLD VENIPUNCTURE: CPT

## 2023-07-07 PROCEDURE — 71045 X-RAY EXAM CHEST 1 VIEW: CPT

## 2023-07-07 PROCEDURE — C1729 CATH, DRAINAGE: HCPCS

## 2023-07-07 PROCEDURE — 700111 HCHG RX REV CODE 636 W/ 250 OVERRIDE (IP): Mod: JZ | Performed by: SURGERY

## 2023-07-07 PROCEDURE — 770001 HCHG ROOM/CARE - MED/SURG/GYN PRIV*

## 2023-07-07 PROCEDURE — 99213 OFFICE O/P EST LOW 20 MIN: CPT | Mod: GE | Performed by: STUDENT IN AN ORGANIZED HEALTH CARE EDUCATION/TRAINING PROGRAM

## 2023-07-07 PROCEDURE — 32555 ASPIRATE PLEURA W/ IMAGING: CPT | Mod: RT

## 2023-07-07 PROCEDURE — 0W9930Z DRAINAGE OF RIGHT PLEURAL CAVITY WITH DRAINAGE DEVICE, PERCUTANEOUS APPROACH: ICD-10-PCS | Performed by: SURGERY

## 2023-07-07 PROCEDURE — A9270 NON-COVERED ITEM OR SERVICE: HCPCS | Performed by: SURGERY

## 2023-07-07 PROCEDURE — 94669 MECHANICAL CHEST WALL OSCILL: CPT

## 2023-07-07 PROCEDURE — 96375 TX/PRO/DX INJ NEW DRUG ADDON: CPT

## 2023-07-07 PROCEDURE — 32551 INSERTION OF CHEST TUBE: CPT | Performed by: SURGERY

## 2023-07-07 PROCEDURE — 80053 COMPREHEN METABOLIC PANEL: CPT

## 2023-07-07 PROCEDURE — 71260 CT THORAX DX C+: CPT

## 2023-07-07 RX ORDER — ONDANSETRON 2 MG/ML
4 INJECTION INTRAMUSCULAR; INTRAVENOUS ONCE
Status: COMPLETED | OUTPATIENT
Start: 2023-07-07 | End: 2023-07-07

## 2023-07-07 RX ORDER — ONDANSETRON 2 MG/ML
4 INJECTION INTRAMUSCULAR; INTRAVENOUS EVERY 4 HOURS PRN
Status: DISCONTINUED | OUTPATIENT
Start: 2023-07-07 | End: 2023-07-17 | Stop reason: HOSPADM

## 2023-07-07 RX ORDER — AMOXICILLIN 250 MG
1 CAPSULE ORAL
Status: DISCONTINUED | OUTPATIENT
Start: 2023-07-07 | End: 2023-07-17 | Stop reason: HOSPADM

## 2023-07-07 RX ORDER — HYDROMORPHONE HYDROCHLORIDE 1 MG/ML
0.5 INJECTION, SOLUTION INTRAMUSCULAR; INTRAVENOUS; SUBCUTANEOUS ONCE
Status: COMPLETED | OUTPATIENT
Start: 2023-07-07 | End: 2023-07-07

## 2023-07-07 RX ORDER — BISACODYL 10 MG
10 SUPPOSITORY, RECTAL RECTAL
Status: DISCONTINUED | OUTPATIENT
Start: 2023-07-07 | End: 2023-07-17 | Stop reason: HOSPADM

## 2023-07-07 RX ORDER — DOCUSATE SODIUM 100 MG/1
100 CAPSULE, LIQUID FILLED ORAL 2 TIMES DAILY
Status: DISCONTINUED | OUTPATIENT
Start: 2023-07-07 | End: 2023-07-17 | Stop reason: HOSPADM

## 2023-07-07 RX ORDER — AMOXICILLIN 250 MG
1 CAPSULE ORAL NIGHTLY
Status: DISCONTINUED | OUTPATIENT
Start: 2023-07-07 | End: 2023-07-17 | Stop reason: HOSPADM

## 2023-07-07 RX ORDER — OXYCODONE HYDROCHLORIDE 10 MG/1
10 TABLET ORAL
Status: DISCONTINUED | OUTPATIENT
Start: 2023-07-07 | End: 2023-07-13

## 2023-07-07 RX ORDER — LEVETIRACETAM 500 MG/1
500 TABLET ORAL 2 TIMES DAILY
Status: DISCONTINUED | OUTPATIENT
Start: 2023-07-07 | End: 2023-07-17 | Stop reason: HOSPADM

## 2023-07-07 RX ORDER — ZIPRASIDONE HYDROCHLORIDE 60 MG/1
60 CAPSULE ORAL 2 TIMES DAILY
Status: DISCONTINUED | OUTPATIENT
Start: 2023-07-07 | End: 2023-07-17 | Stop reason: HOSPADM

## 2023-07-07 RX ORDER — CARBIDOPA/LEVODOPA 25MG-250MG
1 TABLET ORAL EVERY 8 HOURS
Status: DISCONTINUED | OUTPATIENT
Start: 2023-07-07 | End: 2023-07-17 | Stop reason: HOSPADM

## 2023-07-07 RX ORDER — ONDANSETRON 4 MG/1
4 TABLET, ORALLY DISINTEGRATING ORAL EVERY 4 HOURS PRN
Status: DISCONTINUED | OUTPATIENT
Start: 2023-07-07 | End: 2023-07-17 | Stop reason: HOSPADM

## 2023-07-07 RX ORDER — OXYCODONE HYDROCHLORIDE 5 MG/1
5 TABLET ORAL
Status: DISCONTINUED | OUTPATIENT
Start: 2023-07-07 | End: 2023-07-13

## 2023-07-07 RX ORDER — THIAMINE MONONITRATE (VIT B1) 100 MG
TABLET ORAL
Status: ON HOLD | COMMUNITY
Start: 2023-06-16 | End: 2023-07-07

## 2023-07-07 RX ORDER — IPRATROPIUM BROMIDE AND ALBUTEROL SULFATE 2.5; .5 MG/3ML; MG/3ML
3 SOLUTION RESPIRATORY (INHALATION)
Status: DISCONTINUED | OUTPATIENT
Start: 2023-07-07 | End: 2023-07-17 | Stop reason: HOSPADM

## 2023-07-07 RX ORDER — MIDAZOLAM HYDROCHLORIDE 1 MG/ML
5 INJECTION INTRAMUSCULAR; INTRAVENOUS ONCE
Status: COMPLETED | OUTPATIENT
Start: 2023-07-07 | End: 2023-07-07

## 2023-07-07 RX ORDER — HYDROMORPHONE HYDROCHLORIDE 1 MG/ML
0.5 INJECTION, SOLUTION INTRAMUSCULAR; INTRAVENOUS; SUBCUTANEOUS
Status: DISCONTINUED | OUTPATIENT
Start: 2023-07-07 | End: 2023-07-08

## 2023-07-07 RX ORDER — MORPHINE SULFATE 4 MG/ML
4 INJECTION INTRAVENOUS
Status: DISCONTINUED | OUTPATIENT
Start: 2023-07-07 | End: 2023-07-08

## 2023-07-07 RX ORDER — POLYETHYLENE GLYCOL 3350 17 G/17G
1 POWDER, FOR SOLUTION ORAL 2 TIMES DAILY
Status: DISCONTINUED | OUTPATIENT
Start: 2023-07-07 | End: 2023-07-17 | Stop reason: HOSPADM

## 2023-07-07 RX ORDER — ENEMA 19; 7 G/133ML; G/133ML
1 ENEMA RECTAL
Status: DISCONTINUED | OUTPATIENT
Start: 2023-07-07 | End: 2023-07-17 | Stop reason: HOSPADM

## 2023-07-07 RX ORDER — LAMOTRIGINE 100 MG/1
25 TABLET ORAL DAILY
Status: DISCONTINUED | OUTPATIENT
Start: 2023-07-08 | End: 2023-07-17 | Stop reason: HOSPADM

## 2023-07-07 RX ORDER — SODIUM CHLORIDE, SODIUM LACTATE, POTASSIUM CHLORIDE, CALCIUM CHLORIDE 600; 310; 30; 20 MG/100ML; MG/100ML; MG/100ML; MG/100ML
INJECTION, SOLUTION INTRAVENOUS CONTINUOUS
Status: DISCONTINUED | OUTPATIENT
Start: 2023-07-07 | End: 2023-07-08

## 2023-07-07 RX ADMIN — DOCUSATE SODIUM 100 MG: 100 CAPSULE, LIQUID FILLED ORAL at 20:57

## 2023-07-07 RX ADMIN — MORPHINE SULFATE 5 MG: 10 INJECTION INTRAVENOUS at 19:09

## 2023-07-07 RX ADMIN — ONDANSETRON 4 MG: 2 INJECTION INTRAMUSCULAR; INTRAVENOUS at 18:41

## 2023-07-07 RX ADMIN — MIDAZOLAM HYDROCHLORIDE 5 MG: 1 INJECTION, SOLUTION INTRAMUSCULAR; INTRAVENOUS at 19:00

## 2023-07-07 RX ADMIN — IOHEXOL 75 ML: 350 INJECTION, SOLUTION INTRAVENOUS at 19:42

## 2023-07-07 RX ADMIN — CARBIDOPA AND LEVODOPA 1 TABLET: 25; 250 TABLET ORAL at 22:01

## 2023-07-07 RX ADMIN — SODIUM CHLORIDE, POTASSIUM CHLORIDE, SODIUM LACTATE AND CALCIUM CHLORIDE: 600; 310; 30; 20 INJECTION, SOLUTION INTRAVENOUS at 21:04

## 2023-07-07 RX ADMIN — HYDROMORPHONE HYDROCHLORIDE 0.5 MG: 1 INJECTION, SOLUTION INTRAMUSCULAR; INTRAVENOUS; SUBCUTANEOUS at 18:49

## 2023-07-07 RX ADMIN — METFORMIN HYDROCHLORIDE 500 MG: 500 TABLET ORAL at 22:01

## 2023-07-07 RX ADMIN — SENNOSIDES AND DOCUSATE SODIUM 1 TABLET: 50; 8.6 TABLET ORAL at 20:57

## 2023-07-07 RX ADMIN — ZIPRASIDONE HYDROCHLORIDE 60 MG: 60 CAPSULE ORAL at 22:01

## 2023-07-07 RX ADMIN — OXYCODONE HYDROCHLORIDE 10 MG: 10 TABLET ORAL at 20:57

## 2023-07-07 RX ADMIN — MORPHINE SULFATE 4 MG: 4 INJECTION, SOLUTION INTRAMUSCULAR; INTRAVENOUS at 22:02

## 2023-07-07 RX ADMIN — HYDROMORPHONE HYDROCHLORIDE 0.5 MG: 1 INJECTION, SOLUTION INTRAMUSCULAR; INTRAVENOUS; SUBCUTANEOUS at 18:43

## 2023-07-07 RX ADMIN — LEVETIRACETAM 500 MG: 500 TABLET, FILM COATED ORAL at 22:02

## 2023-07-07 ASSESSMENT — FIBROSIS 4 INDEX
FIB4 SCORE: 0.61
FIB4 SCORE: 0.61

## 2023-07-07 ASSESSMENT — COPD QUESTIONNAIRES
DURING THE PAST 4 WEEKS HOW MUCH DID YOU FEEL SHORT OF BREATH: SOME OF THE TIME
DO YOU EVER COUGH UP ANY MUCUS OR PHLEGM?: NO/ONLY WITH OCCASIONAL COLDS OR INFECTIONS
COPD SCREENING SCORE: 1
HAVE YOU SMOKED AT LEAST 100 CIGARETTES IN YOUR ENTIRE LIFE: NO/DON'T KNOW

## 2023-07-07 ASSESSMENT — LIFESTYLE VARIABLES
EVER HAD A DRINK FIRST THING IN THE MORNING TO STEADY YOUR NERVES TO GET RID OF A HANGOVER: NO
EVER FELT BAD OR GUILTY ABOUT YOUR DRINKING: NO
ALCOHOL_USE: NO
CONSUMPTION TOTAL: INCOMPLETE
TOTAL SCORE: 0
HAVE PEOPLE ANNOYED YOU BY CRITICIZING YOUR DRINKING: NO
TOTAL SCORE: 0
HAVE YOU EVER FELT YOU SHOULD CUT DOWN ON YOUR DRINKING: NO
DOES PATIENT WANT TO STOP DRINKING: NO
TOTAL SCORE: 0

## 2023-07-07 ASSESSMENT — COGNITIVE AND FUNCTIONAL STATUS - GENERAL
SUGGESTED CMS G CODE MODIFIER MOBILITY: CL
DRESSING REGULAR UPPER BODY CLOTHING: A LOT
WALKING IN HOSPITAL ROOM: A LOT
TOILETING: A LOT
MOVING FROM LYING ON BACK TO SITTING ON SIDE OF FLAT BED: A LOT
MOVING TO AND FROM BED TO CHAIR: A LOT
SUGGESTED CMS G CODE MODIFIER DAILY ACTIVITY: CK
CLIMB 3 TO 5 STEPS WITH RAILING: A LOT
DAILY ACTIVITIY SCORE: 17
STANDING UP FROM CHAIR USING ARMS: A LOT
DRESSING REGULAR LOWER BODY CLOTHING: A LOT
TURNING FROM BACK TO SIDE WHILE IN FLAT BAD: A LOT
HELP NEEDED FOR BATHING: A LITTLE
MOBILITY SCORE: 12

## 2023-07-07 ASSESSMENT — PATIENT HEALTH QUESTIONNAIRE - PHQ9
SUM OF ALL RESPONSES TO PHQ9 QUESTIONS 1 AND 2: 0
1. LITTLE INTEREST OR PLEASURE IN DOING THINGS: NOT AT ALL
2. FEELING DOWN, DEPRESSED, IRRITABLE, OR HOPELESS: NOT AT ALL

## 2023-07-07 ASSESSMENT — ENCOUNTER SYMPTOMS: FALLS: 1

## 2023-07-08 ENCOUNTER — APPOINTMENT (OUTPATIENT)
Dept: RADIOLOGY | Facility: MEDICAL CENTER | Age: 43
DRG: 200 | End: 2023-07-08
Attending: SURGERY
Payer: MEDICARE

## 2023-07-08 LAB
ANION GAP SERPL CALC-SCNC: 11 MMOL/L (ref 7–16)
BASOPHILS # BLD AUTO: 0.7 % (ref 0–1.8)
BASOPHILS # BLD: 0.11 K/UL (ref 0–0.12)
BUN SERPL-MCNC: 15 MG/DL (ref 8–22)
CALCIUM SERPL-MCNC: 8.9 MG/DL (ref 8.5–10.5)
CHLORIDE SERPL-SCNC: 101 MMOL/L (ref 96–112)
CO2 SERPL-SCNC: 25 MMOL/L (ref 20–33)
CREAT SERPL-MCNC: 0.63 MG/DL (ref 0.5–1.4)
EOSINOPHIL # BLD AUTO: 0.14 K/UL (ref 0–0.51)
EOSINOPHIL NFR BLD: 0.8 % (ref 0–6.9)
ERYTHROCYTE [DISTWIDTH] IN BLOOD BY AUTOMATED COUNT: 47.3 FL (ref 35.9–50)
GFR SERPLBLD CREATININE-BSD FMLA CKD-EPI: 111 ML/MIN/1.73 M 2
GLUCOSE BLD STRIP.AUTO-MCNC: 110 MG/DL (ref 65–99)
GLUCOSE BLD STRIP.AUTO-MCNC: 115 MG/DL (ref 65–99)
GLUCOSE BLD STRIP.AUTO-MCNC: 98 MG/DL (ref 65–99)
GLUCOSE SERPL-MCNC: 117 MG/DL (ref 65–99)
HCT VFR BLD AUTO: 42.2 % (ref 37–47)
HGB BLD-MCNC: 13.3 G/DL (ref 12–16)
IMM GRANULOCYTES # BLD AUTO: 0.12 K/UL (ref 0–0.11)
IMM GRANULOCYTES NFR BLD AUTO: 0.7 % (ref 0–0.9)
LYMPHOCYTES # BLD AUTO: 2 K/UL (ref 1–4.8)
LYMPHOCYTES NFR BLD: 12 % (ref 22–41)
MCH RBC QN AUTO: 29.6 PG (ref 27–33)
MCHC RBC AUTO-ENTMCNC: 31.5 G/DL (ref 32.2–35.5)
MCV RBC AUTO: 94 FL (ref 81.4–97.8)
MONOCYTES # BLD AUTO: 1.55 K/UL (ref 0–0.85)
MONOCYTES NFR BLD AUTO: 9.3 % (ref 0–13.4)
NEUTROPHILS # BLD AUTO: 12.71 K/UL (ref 1.82–7.42)
NEUTROPHILS NFR BLD: 76.5 % (ref 44–72)
NRBC # BLD AUTO: 0 K/UL
NRBC BLD-RTO: 0 /100 WBC (ref 0–0.2)
PLATELET # BLD AUTO: 352 K/UL (ref 164–446)
PMV BLD AUTO: 9.5 FL (ref 9–12.9)
POTASSIUM SERPL-SCNC: 4.7 MMOL/L (ref 3.6–5.5)
RBC # BLD AUTO: 4.49 M/UL (ref 4.2–5.4)
SODIUM SERPL-SCNC: 137 MMOL/L (ref 135–145)
WBC # BLD AUTO: 16.6 K/UL (ref 4.8–10.8)

## 2023-07-08 PROCEDURE — 700111 HCHG RX REV CODE 636 W/ 250 OVERRIDE (IP): Mod: JZ | Performed by: NURSE PRACTITIONER

## 2023-07-08 PROCEDURE — 99222 1ST HOSP IP/OBS MODERATE 55: CPT | Mod: AI | Performed by: SURGERY

## 2023-07-08 PROCEDURE — 94669 MECHANICAL CHEST WALL OSCILL: CPT

## 2023-07-08 PROCEDURE — 770001 HCHG ROOM/CARE - MED/SURG/GYN PRIV*

## 2023-07-08 PROCEDURE — 80048 BASIC METABOLIC PNL TOTAL CA: CPT

## 2023-07-08 PROCEDURE — 82962 GLUCOSE BLOOD TEST: CPT

## 2023-07-08 PROCEDURE — 71045 X-RAY EXAM CHEST 1 VIEW: CPT

## 2023-07-08 PROCEDURE — 700102 HCHG RX REV CODE 250 W/ 637 OVERRIDE(OP): Performed by: SURGERY

## 2023-07-08 PROCEDURE — 85025 COMPLETE CBC W/AUTO DIFF WBC: CPT

## 2023-07-08 PROCEDURE — 36415 COLL VENOUS BLD VENIPUNCTURE: CPT

## 2023-07-08 PROCEDURE — A9270 NON-COVERED ITEM OR SERVICE: HCPCS | Performed by: SURGERY

## 2023-07-08 RX ORDER — ENOXAPARIN SODIUM 100 MG/ML
40 INJECTION SUBCUTANEOUS EVERY 12 HOURS
Status: DISCONTINUED | OUTPATIENT
Start: 2023-07-08 | End: 2023-07-17 | Stop reason: HOSPADM

## 2023-07-08 RX ADMIN — DOCUSATE SODIUM 100 MG: 100 CAPSULE, LIQUID FILLED ORAL at 17:58

## 2023-07-08 RX ADMIN — OXYCODONE HYDROCHLORIDE 5 MG: 5 TABLET ORAL at 14:40

## 2023-07-08 RX ADMIN — LEVETIRACETAM 500 MG: 500 TABLET, FILM COATED ORAL at 06:15

## 2023-07-08 RX ADMIN — ZIPRASIDONE HYDROCHLORIDE 60 MG: 60 CAPSULE ORAL at 17:57

## 2023-07-08 RX ADMIN — ENOXAPARIN SODIUM 40 MG: 100 INJECTION SUBCUTANEOUS at 20:44

## 2023-07-08 RX ADMIN — METFORMIN HYDROCHLORIDE 500 MG: 500 TABLET ORAL at 08:26

## 2023-07-08 RX ADMIN — OXYCODONE HYDROCHLORIDE 5 MG: 5 TABLET ORAL at 06:16

## 2023-07-08 RX ADMIN — CARBIDOPA AND LEVODOPA 1 TABLET: 25; 250 TABLET ORAL at 06:15

## 2023-07-08 RX ADMIN — DOCUSATE SODIUM 100 MG: 100 CAPSULE, LIQUID FILLED ORAL at 06:15

## 2023-07-08 RX ADMIN — LAMOTRIGINE 25 MG: 100 TABLET ORAL at 06:15

## 2023-07-08 RX ADMIN — ZIPRASIDONE HYDROCHLORIDE 60 MG: 60 CAPSULE ORAL at 06:15

## 2023-07-08 RX ADMIN — METFORMIN HYDROCHLORIDE 500 MG: 500 TABLET ORAL at 17:57

## 2023-07-08 RX ADMIN — OXYCODONE HYDROCHLORIDE 5 MG: 5 TABLET ORAL at 20:45

## 2023-07-08 RX ADMIN — POLYETHYLENE GLYCOL 3350 1 PACKET: 17 POWDER, FOR SOLUTION ORAL at 17:58

## 2023-07-08 RX ADMIN — ENOXAPARIN SODIUM 40 MG: 100 INJECTION SUBCUTANEOUS at 08:27

## 2023-07-08 RX ADMIN — LEVETIRACETAM 500 MG: 500 TABLET, FILM COATED ORAL at 17:57

## 2023-07-08 RX ADMIN — SENNOSIDES AND DOCUSATE SODIUM 1 TABLET: 50; 8.6 TABLET ORAL at 20:44

## 2023-07-08 RX ADMIN — CARBIDOPA AND LEVODOPA 1 TABLET: 25; 250 TABLET ORAL at 22:29

## 2023-07-08 RX ADMIN — CARBIDOPA AND LEVODOPA 1 TABLET: 25; 250 TABLET ORAL at 14:41

## 2023-07-08 ASSESSMENT — ENCOUNTER SYMPTOMS
RESPIRATORY NEGATIVE: 1
ROS GI COMMENTS: BM PTA
NEUROLOGICAL NEGATIVE: 1
PSYCHIATRIC NEGATIVE: 1
MYALGIAS: 1

## 2023-07-08 ASSESSMENT — PAIN DESCRIPTION - PAIN TYPE: TYPE: SURGICAL PAIN

## 2023-07-08 NOTE — CARE PLAN
Problem: Hyperinflation  Goal: Prevent or improve atelectasis  Description: Target End Date:  3 to 4 days    1. Instruct incentive spirometry usage  2.  Perform hyperinflation therapy as indicated  Outcome: Not Met   PEP QID -500

## 2023-07-08 NOTE — H&P
DATE OF ADMISSION:  07/07/2023     TRAUMA ADMISSION HISTORY AND PHYSICAL.     IDENTIFICATION:  This is a 42-year-old female.     HISTORY OF PRESENT ILLNESS:  The patient apparently fell in the shower a few   days ago and was complaining of right rib pain.  She was seen in the emergency   room on 07/04 and was found to have multiple rib fractures, but was   discharged.  She has had worsening pain.  She was brought back to the   emergency room where repeat chest x-ray shows not only now rib fractures, but   also what appears to be a small pneumohemothorax.  I have been asked to see   her in regards to this.     PAST MEDICAL HISTORY:  ILLNESSES:  Bipolar disorder as well as developmental delay.  PAST SURGICAL HISTORY:  Excision of a lesion.     MEDICATIONS:  Currently are Sinemet, Lamictal, Keppra, Glucophage as well as   Geodon.     ALLERGIES:  ACETAMINOPHEN, PENICILLIN AND SHELLFISH.     PHYSICAL EXAMINATION:  VITAL SIGNS:  Her blood pressure was 115/100.  Her sats were in the 80s on   room air and she was placed on oxygen.  HEENT:   Head is without evidence of trauma.  Pupils are 4 mm.  NECK:  Nontender.  CHEST:   Tender along the right lateral chest.  ABDOMEN:  Soft.  PELVIS:  Stable.  EXTREMITIES:  Without evidence of injury.  NEUROLOGIC:  At baseline.     LABORATORY DATA:  Blood work demonstrated a hemoglobin of 13.9, platelet count   of 362,000.  Electrolytes are normal.     DIAGNOSTIC DATA:  Chest x-ray demonstrated a small right hydropneumothorax and   multiple right-sided rib fractures.  CT scan was obtained after chest tube   placement, which demonstrated bilateral airspace disease with right posterior   rib fractures and possible atelectasis, but no residual hemopneumothorax.     IMPRESSION:  A 42-year-old female status post a remote fall 3-days ago with   right-sided rib fractures and hydropneumothorax.     PLAN:  Will be for chest tube placement in the emergency room and then   admission.  This has  been explained to her caregiver and they understand the   process. We will plan on pulmonary toilet as well as serial chest x-rays.           ______________________________  MD BERNARDA LYONS/FARZANA/ANNA    DD:  07/08/2023 04:43  DT:  07/08/2023 06:14    Job#:  [id number]

## 2023-07-08 NOTE — ASSESSMENT & PLAN NOTE
Right 6th, 7th, and 8th right posterior rib fractures and possibly 9th posterior rib fracture.  Aggressive pulmonary hygiene and multimodal pain management and serial chest radiography.

## 2023-07-08 NOTE — PROGRESS NOTES
Trauma / Surgical Daily Progress Note    Date of Service  7/8/2023    Chief Complaint  42 y.o. female admitted 7/7/2023 as a non trauma activation - Fall in bathroom 3 d PTA - Right rib fx 6-9 and pneumothorax.    Interval Events  Tertiary complete - no further findings  ROMI/SBIRT complete  Adequate pain control   CXR without pneumo  CT output 375cc / no air leak / suction   cc   On Lovenox   Home meds resumed      Encourage pulmonary hygiene  OOB all meals  Mobilize     Review of Systems  Review of Systems   Constitutional:  Positive for malaise/fatigue.   HENT: Negative.     Respiratory: Negative.     Gastrointestinal:         BM PTA   Genitourinary:         Voiding   Musculoskeletal:  Positive for myalgias.   Neurological: Negative.    Psychiatric/Behavioral: Negative.     All other systems reviewed and are negative.       Vital Signs  Temp:  [36.6 °C (97.9 °F)-37.3 °C (99.1 °F)] 36.8 °C (98.2 °F)  Pulse:  [] 99  Resp:  [16-20] 19  BP: (115-130)/() 123/83  SpO2:  [92 %-97 %] 97 %    Physical Exam  Physical Exam  Vitals and nursing note reviewed.   Constitutional:       General: She is not in acute distress.     Appearance: Normal appearance.   HENT:      Right Ear: External ear normal.      Left Ear: External ear normal.      Nose: Nose normal.      Mouth/Throat:      Mouth: Mucous membranes are moist.      Pharynx: Oropharynx is clear.   Eyes:      General:         Right eye: No discharge.         Left eye: No discharge.      Pupils: Pupils are equal, round, and reactive to light.   Pulmonary:      Effort: Pulmonary effort is normal. No respiratory distress.      Comments: Diminished   CT site with some slight leaking - nursing to reinforce   Chest:      Chest wall: Tenderness present.   Abdominal:      General: There is no distension.      Palpations: Abdomen is soft.      Tenderness: There is no abdominal tenderness.   Musculoskeletal:         General: No tenderness.      Cervical back:  Normal range of motion. No rigidity. No muscular tenderness.   Skin:     General: Skin is warm.      Capillary Refill: Capillary refill takes less than 2 seconds.   Neurological:      General: No focal deficit present.      Mental Status: She is alert and oriented to person, place, and time.   Psychiatric:         Attention and Perception: Attention normal.         Mood and Affect: Affect is labile.         Behavior: Behavior normal.         Thought Content: Thought content normal.       Core Measures & Quality Metrics  Medications reviewed, Labs reviewed and Radiology images reviewed  Tolbert catheter: No Tolbert      DVT Prophylaxis: Enoxaparin (Lovenox)  DVT prophylaxis - mechanical: SCDs  Ulcer prophylaxis: Not indicated    Assessed for rehab: Patient was assess for and/or received rehabilitation services during this hospitalization    RAP Score Total: 6    CAGE Results: negative Blood Alcohol>0.08: no       Assessment/Plan  * Traumatic hemothorax without open wound into thorax, initial encounter- (present on admission)  Assessment & Plan  Small right hemopneumothorax.  Chest tube placed in ED.  Aggressive pulmonary hygiene. Serial chest radiographs.     Fracture four ribs-closed, right, initial encounter- (present on admission)  Assessment & Plan  Right rib fractures 6-9.  Aggressive multimodal pain management, and pulmonary hygiene. Serial chest radiographs.    No contraindication to deep vein thrombosis (DVT) prophylaxis- (present on admission)  Assessment & Plan  Prophylactic dose enoxaparin 40 mg BID initiated upon admission.    Prediabetes- (present on admission)  Assessment & Plan  Chronic condition treated with metformin.  Resumed maintenance medication on admission.    Parkinson disease (HCC)- (present on admission)  Assessment & Plan  Chronic condition treated with carbidopa-levodopa.  Resumed maintenance medication on admission.    Bipolar 1 disorder, depressed, moderate (HCC)- (present on  admission)  Assessment & Plan  Chronic condition treated with Geodon.  Resumed maintenance medication on admission.    Partial epilepsy with impairment of consciousness, intractable (HCC)- (present on admission)  Assessment & Plan  Chronic condition treated with Lamictal and Keppra.  Resumed maintenance medication on admission.    Class 3 severe obesity in adult (HCC)- (present on admission)  Assessment & Plan  BMI 42    Trauma- (present on admission)  Assessment & Plan  Fell in bathroom on 7/4.  Worsening hypoxia and pain.  Non Trauma Activation.  Vale Nieves MD. Trauma Surgery.    Mental status adequate for full examination?: Yes    Spine cleared (radiologically and/or clinically): Yes    All current laboratory studies/radiology exams reviewed: Yes    Medications reconciliation has been reviewed: Yes    Completed Consultations:  None     Pending Consultations:  None    Newly Identified Diagnoses and Injuries:  None    Discussed patient condition with RN, Patient, and Dr. Nieves .

## 2023-07-08 NOTE — PROGRESS NOTES
Subjective:     CC: rib pain, difficulty breathing    HPI:   Erin presents today accompanied by her caregiver for complaints of worsening rib pain.  Patient was seen in the ER on 7/4/2023 and found to have 4 posterior rib fractures as well as a hemothorax was discharged home.  Since going home patient has continued to have difficulty taking a full breath and rates the pain as an 8 out of 10.  Patient reports that she did fall while she was in the shower however this was unwitnessed by any caregivers in the group home.  She states that she fell onto her right side at the edge of the tub.  Her caregiver also reports that she has had a change in her breathing pattern and appears uncomfortable.    No problems updated.    Current Outpatient Medications Ordered in Epic   Medication Sig Dispense Refill    Multiple Vitamin (MULTIVITAMIN) Tab TAKE 1 TABLET BY MOUTH ONCE DAILY. 30 Tablet 0    melatonin 5 mg Tab Take 1 Tablet by mouth at bedtime as needed (insomnia). 30 Tablet 3    metFORMIN ER (GLUCOPHAGE XR) 500 MG TABLET SR 24 HR TAKE 1 TABLET BY MOUTH ONCE DAILY FOR PRE-DIABETES. 90 Tablet 3    Incontinence Supply Disposable (PREVAIL SUPER PLUS XLARGE) Misc WEAR DAILY, CHANGE AS NEEDED. 98 Each 3    lamoTRIgine (LAMICTAL) 25 MG Tab Take 1 Tab by mouth every day. 90 Tab 1    carbidopa-levodopa (SINEMET)  MG Tab Take 1 Tab by mouth 3 times a day. 270 Tab 1    levETIRAcetam (KEPPRA) 500 MG Tab Take 1 Tab by mouth 3 times a day. 270 Tab 1    ziprasidone (GEODON) 60 MG Cap Take 60 mg by mouth every evening.      thiamine (VITAMIN B-1) 100 MG Tab       thiamine (VITAMIN B-1) 100 MG Tab       thiamine (VITAMIN B-1) 100 MG Tab TAKE 1 TABLET BY MOUTH ONCE DAILY. 90 Tablet 3     No current Epic-ordered facility-administered medications on file.     ROS:  Review of Systems   Respiratory:          + Difficulty breathing   Musculoskeletal:  Positive for falls.        + Rib pain   Neurological:         Developmentally delayed.  At  "reported baseline.       Objective:     Exam:  BP (P) 125/84 (BP Location: Right arm, Patient Position: Sitting, BP Cuff Size: Adult)   Pulse (P) 100   Temp (P) 36.6 °C (97.8 °F) (Temporal)   Ht 1.549 m (5' 1\")   Wt 101 kg (223 lb)   SpO2 (P) 92%   BMI 42.14 kg/m²  Body mass index is 42.14 kg/m².    Physical Exam  Vitals and nursing note reviewed.   Constitutional:       Comments: Appears uncomfortable   HENT:      Nose: Nose normal.   Eyes:      Conjunctiva/sclera: Conjunctivae normal.   Cardiovascular:      Rate and Rhythm: Tachycardia present.   Pulmonary:      Comments: Appears uncomfortable.  Unable to take full breaths due to pain.  Musculoskeletal:      Cervical back: Normal range of motion and neck supple.   Skin:     Findings: Bruising (right lateral abdomen) present.   Neurological:      Mental Status: She is alert.         Assessment & Plan:     42 y.o. female with the following -     #Multiple rib fractures involving 4 or more ribs  #Rib pain  #Painful breathing  Patient had a fall in the shower and was seen in the emergency department on 7/4/2023.  She was found to have 5 posterior rib fractures and a possible hemothorax.  APS report was made per ER physician note. She was discharged home.  She does continue to have severe pain, rating it at 8 out of 10 at the best.  She does continue to have breathing difficulties as she cannot take a full breath due to the pain.  Vital signs in clinic today include a tachycardic heart rate as well as a an oxygen saturation of 92%.  Patient is unable to safely be given opioid pain medications in an outpatient setting and due to vital signs, recommended patient be admitted to inpatient service for pain management as well as further evaluation.  Caregiver was with her in the office today and will take her to the emergency department.  I tried to contact patient's legal guardian Nay Seals and number listed on file went to the office and said the office was " closed.   -Present to emergency department for evaluation for admission for pain management and further evaluation     Problem List Items Addressed This Visit    None  Visit Diagnoses       Multiple rib fractures involving four or more ribs        Rib pain        Painful breathing                I spent a total of 40 minutes with record review, exam, communication with the patient, communication with other providers, and documentation of this encounter.      Paulette Gomez MD  [facility] PGY-2

## 2023-07-08 NOTE — ASSESSMENT & PLAN NOTE
Fell in bathroom on 7/4 and discharged home from ED.   Presented back to ER with worsening pain.  Non Trauma Activation.  Vale Nieves MD. Trauma Surgery.

## 2023-07-08 NOTE — ED TRIAGE NOTES
Ambulatory to triage with   Chief Complaint   Patient presents with    Rib Pain    Sent by MD Hunter     Hypoxia at follow-up appointment     Fell in the bathtub on 7/4/23 and fractured R posterior rib 6-10. Seen at PCP today and was hypoxic on RA and c/o uncontrolled pain. Care giver states she has not been prescribed pain medications at home. Pulse ox is 92% on RA; .

## 2023-07-08 NOTE — PROCEDURES
Procedure Report    Date of Procedure: 7/8/2023    Procedure: Right tube thoracostomy    Surgeon: Vale Nieves MD    Diagnosis: Trauma, right  rib fractures    Indications: Pneumothorax    Procedure in detail: The patient's right lateral chest wall was prepped and draped in the standard sterile fashion. Lidocaine was injected in the skin and subcutaneous tissues in the 4th or 5th intercostal space down to the pleura. A 2cm skin incision was made at the level of the nipple in the anterior axillary line. Blunt dissection was used to enter the thoracic cavity. A 3236Fr chest tube was inserted and secured to the skin at 16cm with suture.  Sterile dressings were applied. The chest tube was attached to -20cm water suction. The patient tolerated the procedure well.  A chest x-ray was ordered for verification.       Vale Nieves MD  [facility]  [phone]

## 2023-07-08 NOTE — ASSESSMENT & PLAN NOTE
Small right hemopneumothorax.  7/7 Chest tube placed in ED.   7/11 Chest xray with increased pneumo without air leak. Increased suction to - 30 cm.  7/12 Chest xray with resolved right pneumothorax. Chest tube with 130 mL of serosanguinous output over last 24 hours. Decreased suction to - 20 cm.  7/13 Chest xray with recurrent 6 mm right apical pneumothorax, no air leak appreciated. Chest tube with 120 mL of serous output over 24 hours.   7/15 Chest tube removed  7/16 Follow up imaging pending

## 2023-07-08 NOTE — ED NOTES
Unable to obtain med rec at this time  Contacted [facility], representative is going to call back with medication list

## 2023-07-08 NOTE — ED PROVIDER NOTES
ER Provider Note    Scribed for Fabricio Reynoso M.d. by Ellie Chakraborty. 7/7/2023  6:15 PM    Primary Care Provider: Xuan Levi M.D.    CHIEF COMPLAINT  Chief Complaint   Patient presents with    Rib Pain    Sent by MD    Other     Hypoxia at follow-up appointment     LIMITATION TO HISTORY   Select: : None    HPI/ROS  OUTSIDE HISTORIAN(S):  None    EXTERNAL RECORDS REVIEWED  Inpatient Notes Patient was seen 3 days ago for right sided chest pain. She has a history of developmental delay and is at a group home. She was found to have 5 posterior rib fracture on right. She was discharged him. She then saw her PCP who felt she was hypoxic. PCP felt in patient management would be appropriate.     Erin Santiago is a 42 y.o. female who presents to the ED for evaluation per MD. Patient fell in the shower a few days ago. She is having continued pain in the right rib area. Today, patient was seeing her PCP today and was hypoxic on room air. She denies fever or cough . No alleviating factors were reported.          PAST MEDICAL HISTORY  Past Medical History:   Diagnosis Date    Anxiety     Depression     depression     Diabetes     dx 2013 blood sugars not checked at home    Epilepsy (HCC)     seizure free    Mental disability     Sleep apnea     no cpap medicaid took it away due to non compliance    Snoring     Unspecified urinary incontinence     behavioral       SURGICAL HISTORY  Past Surgical History:   Procedure Laterality Date    LESION EXCISION GENERAL  2/20/2014    Performed by Lemuel Fernandez M.D. at SURGERY SAME DAY [facility]       FAMILY HISTORY  None noted.     SOCIAL HISTORY   reports that she has quit smoking. Her smoking use included cigarettes. She has never used smokeless tobacco. She reports that she does not drink alcohol and does not use drugs.    CURRENT MEDICATIONS  Current Discharge Medication List        CONTINUE these medications which have NOT CHANGED    Details   Multiple Vitamin  "(MULTIVITAMIN) Tab TAKE 1 TABLET BY MOUTH ONCE DAILY.  Qty: 30 Tablet, Refills: 0      thiamine (VITAMIN B-1) 100 MG Tab Take 100 mg by mouth every day.      metFORMIN ER (GLUCOPHAGE XR) 500 MG TABLET SR 24 HR TAKE 1 TABLET BY MOUTH ONCE DAILY FOR PRE-DIABETES.  Qty: 90 Tablet, Refills: 3      Incontinence Supply Disposable (PREVAIL SUPER PLUS XLARGE) Misc WEAR DAILY, CHANGE AS NEEDED.  Qty: 98 Each, Refills: 3      lamoTRIgine (LAMICTAL) 25 MG Tab Take 1 Tab by mouth every day.  Qty: 90 Tab, Refills: 1      carbidopa-levodopa (SINEMET)  MG Tab Take 1 Tab by mouth 3 times a day.  Qty: 270 Tab, Refills: 1      levETIRAcetam (KEPPRA) 500 MG Tab Take 1 Tab by mouth 3 times a day.  Qty: 270 Tab, Refills: 1      ziprasidone (GEODON) 60 MG Cap Take 60 mg by mouth every evening.             ALLERGIES  Acetaminophen, Other misc, Penicillins, and Shellfish allergy    PHYSICAL EXAM  BP (!) 115/103   Pulse (!) 102   Temp 36.6 °C (97.9 °F) (Temporal)   Resp 16   Ht 1.549 m (5' 1\")   Wt 101 kg (223 lb 1.7 oz)   LMP  (LMP Unknown)   SpO2 92%   BMI 42.16 kg/m²     Constitutional: Well developed, Well nourished, mild distress,    HENT: Normocephalic, Atraumatic   Eyes: PERRLA, EOMI, Conjunctiva normal, No discharge.   Neck: No tenderness, Supple, No stridor.   Cardiovascular: Tachycardic heart rate, Normal rhythm.   Thorax & Lungs: Shallow respirations. Clear to auscultation bilaterally, No respiratory distress, No wheezing, No crackles.   Abdomen: Soft, No tenderness, No masses, No pulsatile masses.   Skin: Warm, Dry, No erythema, No rash.    Back: Tenderness right posterior thoracic area.   Musculoskeletal: No major deformities noted.  Intact distal pulses  Neurologic: Awake, alert. Moves all extremities spontaneously.  Psychiatric: Affect normal, Judgment normal, Mood normal.       DIAGNOSTIC STUDIES & PROCEDURES    Labs:   Results for orders placed or performed during the hospital encounter of 07/04/23   CBC with " Differential   Result Value Ref Range    WBC 15.9 (H) 4.8 - 10.8 K/uL    RBC 4.36 4.20 - 5.40 M/uL    Hemoglobin 12.9 12.0 - 16.0 g/dL    Hematocrit 40.2 37.0 - 47.0 %    MCV 92.2 81.4 - 97.8 fL    MCH 29.6 27.0 - 33.0 pg    MCHC 32.1 (L) 32.2 - 35.5 g/dL    RDW 46.0 35.9 - 50.0 fL    Platelet Count 263 164 - 446 K/uL    MPV 10.2 9.0 - 12.9 fL    Neutrophils-Polys 83.30 (H) 44.00 - 72.00 %    Lymphocytes 8.20 (L) 22.00 - 41.00 %    Monocytes 7.70 0.00 - 13.40 %    Eosinophils 0.00 0.00 - 6.90 %    Basophils 0.30 0.00 - 1.80 %    Immature Granulocytes 0.50 0.00 - 0.90 %    Nucleated RBC 0.00 0.00 - 0.20 /100 WBC    Neutrophils (Absolute) 13.24 (H) 1.82 - 7.42 K/uL    Lymphs (Absolute) 1.31 1.00 - 4.80 K/uL    Monos (Absolute) 1.23 (H) 0.00 - 0.85 K/uL    Eos (Absolute) 0.00 0.00 - 0.51 K/uL    Baso (Absolute) 0.05 0.00 - 0.12 K/uL    Immature Granulocytes (abs) 0.08 0.00 - 0.11 K/uL    NRBC (Absolute) 0.00 K/uL   Complete Metabolic Panel (CMP)   Result Value Ref Range    Sodium 137 135 - 145 mmol/L    Potassium 3.8 3.6 - 5.5 mmol/L    Chloride 103 96 - 112 mmol/L    Co2 21 20 - 33 mmol/L    Anion Gap 13.0 7.0 - 16.0    Glucose 141 (H) 65 - 99 mg/dL    Bun 13 8 - 22 mg/dL    Creatinine 0.69 0.50 - 1.40 mg/dL    Calcium 8.5 8.5 - 10.5 mg/dL    AST(SGOT) 12 12 - 45 U/L    ALT(SGPT) 11 2 - 50 U/L    Alkaline Phosphatase 58 30 - 99 U/L    Total Bilirubin 0.4 0.1 - 1.5 mg/dL    Albumin 3.8 3.2 - 4.9 g/dL    Total Protein 6.6 6.0 - 8.2 g/dL    Globulin 2.8 1.9 - 3.5 g/dL    A-G Ratio 1.4 g/dL   Troponins NOW   Result Value Ref Range    Troponin T <6 6 - 19 ng/L   Troponins in two (2) hours   Result Value Ref Range    Troponin T <6 6 - 19 ng/L   HCG Qual Serum   Result Value Ref Range    Beta-Hcg Qualitative Serum Negative Negative   D-DIMER   Result Value Ref Range    D-Dimer 1.46 (H) 0.00 - 0.50 ug/mL (FEU)   CORRECTED CALCIUM   Result Value Ref Range    Correct Calcium 8.7 8.5 - 10.5 mg/dL   ESTIMATED GFR   Result Value  Ref Range    GFR (CKD-EPI) 109 >60 mL/min/1.73 m 2                         All labs reviewed by me.      Radiology:   The attending Emergency Physician has independently interpreted the diagnostic imaging associated with this visit and is awaiting the final reading from the radiologist, which will be displayed below.    My preliminary interpretation is a follows: Pneumothorax    Radiologist interpretation:     CT-CHEST (THORAX) WITH   Final Result      1.  Interval placement of a right apical chest tube with a small amount of residual right apical pneumothorax.   2.  Bilateral airspace disease, right greater the left most likely due to atelectasis.   3.  Right posterior rib fractures again seen.   4.  There is hepatic steatosis.         DX-CHEST TUBE PLACEMENT RIGHT   Final Result      Interval placement of right chest tube.      No appreciable pneumothorax.      DX-CHEST-PORTABLE (1 VIEW)   Final Result         Small right hydropneumothorax.      Multiple right rib fractures.      Preliminary findings texted to Dr. ROSA WALLS in the Emergency Department via Vo[employer] on 7/7/2023 6:28 PM            DX-CHEST-PORTABLE (1 VIEW)    (Results Pending)        COURSE & MEDICAL DECISION MAKING    ED Observation Status? No; Patient does not meet criteria for ED Observation.     INITIAL ASSESSMENT AND PLAN  Care Narrative: Patient is coming in with increasing pain, questionable respiratory failure.  Chest x-ray shows the patient now has a pneumothorax with likely hemocomponent to it.  Discussed the case with Dr. Nieves who will evaluate the patient and likely place a chest tube, the patient will be admitted to the hospital.      6:15 PM - Patient seen and evaluated at bedside. Erin Santiago is a 42 y.o. female who presents per MD. Patient will be treated with Dilaudid injection 0.5 mg for her symptoms. Ordered labs and scans to evaluate. She understands and agrees to the plan of care.   6:17 PM - Patient will be  treated with Zofran injection 4 mg.     6:31 PM - Paged Trauma.     6:34 PM I discussed the patient's case and the above findings with Dr. Nieves (Trauma) who kindly agreed to evaluate the patient.                 DISPOSITION AND DISCUSSIONS  I have discussed management of the patient with the following physicians and JACQUIE's: Dr. Nieves (Trauma)    Discussion of management with other [facility] or appropriate source(s): None   DISPOSITION:  Patient will be hospitalized by Dr. Nieves in guarded condition.    FINAL IMPRESSION   1. Closed fracture of multiple ribs of right side, initial encounter    2. Hemopneumothorax on right         IEllie (Scribe), am scribing for, and in the presence of, Fabricio Reynoso M.D..    Electronically signed by: Ellie Chakraborty (Scribe), 7/7/2023    IFabricio M.D. personally performed the services described in this documentation, as scribed by Ellie Chakraborty in my presence, and it is both accurate and complete.    The note accurately reflects work and decisions made by me.  Fabricio Reynoso M.D.  7/7/2023  10:35 PM

## 2023-07-09 ENCOUNTER — APPOINTMENT (OUTPATIENT)
Dept: RADIOLOGY | Facility: MEDICAL CENTER | Age: 43
DRG: 200 | End: 2023-07-09
Attending: SURGERY
Payer: MEDICARE

## 2023-07-09 PROBLEM — Z78.9 LIVES IN GROUP HOME: Status: ACTIVE | Noted: 2023-07-09

## 2023-07-09 LAB
ANION GAP SERPL CALC-SCNC: 10 MMOL/L (ref 7–16)
BASOPHILS # BLD AUTO: 0.4 % (ref 0–1.8)
BASOPHILS # BLD: 0.05 K/UL (ref 0–0.12)
BUN SERPL-MCNC: 8 MG/DL (ref 8–22)
CALCIUM SERPL-MCNC: 8.3 MG/DL (ref 8.5–10.5)
CHLORIDE SERPL-SCNC: 102 MMOL/L (ref 96–112)
CO2 SERPL-SCNC: 25 MMOL/L (ref 20–33)
CREAT SERPL-MCNC: 0.5 MG/DL (ref 0.5–1.4)
EOSINOPHIL # BLD AUTO: 0.51 K/UL (ref 0–0.51)
EOSINOPHIL NFR BLD: 3.9 % (ref 0–6.9)
ERYTHROCYTE [DISTWIDTH] IN BLOOD BY AUTOMATED COUNT: 45.2 FL (ref 35.9–50)
GFR SERPLBLD CREATININE-BSD FMLA CKD-EPI: 118 ML/MIN/1.73 M 2
GLUCOSE SERPL-MCNC: 102 MG/DL (ref 65–99)
HCT VFR BLD AUTO: 38.7 % (ref 37–47)
HGB BLD-MCNC: 12.6 G/DL (ref 12–16)
IMM GRANULOCYTES # BLD AUTO: 0.05 K/UL (ref 0–0.11)
IMM GRANULOCYTES NFR BLD AUTO: 0.4 % (ref 0–0.9)
LYMPHOCYTES # BLD AUTO: 2.26 K/UL (ref 1–4.8)
LYMPHOCYTES NFR BLD: 17.3 % (ref 22–41)
MCH RBC QN AUTO: 29.9 PG (ref 27–33)
MCHC RBC AUTO-ENTMCNC: 32.6 G/DL (ref 32.2–35.5)
MCV RBC AUTO: 91.9 FL (ref 81.4–97.8)
MONOCYTES # BLD AUTO: 1.03 K/UL (ref 0–0.85)
MONOCYTES NFR BLD AUTO: 7.9 % (ref 0–13.4)
NEUTROPHILS # BLD AUTO: 9.2 K/UL (ref 1.82–7.42)
NEUTROPHILS NFR BLD: 70.1 % (ref 44–72)
NRBC # BLD AUTO: 0 K/UL
NRBC BLD-RTO: 0 /100 WBC (ref 0–0.2)
PLATELET # BLD AUTO: 311 K/UL (ref 164–446)
PMV BLD AUTO: 9.5 FL (ref 9–12.9)
POTASSIUM SERPL-SCNC: 4.1 MMOL/L (ref 3.6–5.5)
RBC # BLD AUTO: 4.21 M/UL (ref 4.2–5.4)
SODIUM SERPL-SCNC: 137 MMOL/L (ref 135–145)
WBC # BLD AUTO: 13.1 K/UL (ref 4.8–10.8)

## 2023-07-09 PROCEDURE — 94669 MECHANICAL CHEST WALL OSCILL: CPT

## 2023-07-09 PROCEDURE — 700111 HCHG RX REV CODE 636 W/ 250 OVERRIDE (IP): Mod: JZ | Performed by: NURSE PRACTITIONER

## 2023-07-09 PROCEDURE — 80048 BASIC METABOLIC PNL TOTAL CA: CPT

## 2023-07-09 PROCEDURE — 700102 HCHG RX REV CODE 250 W/ 637 OVERRIDE(OP): Performed by: SURGERY

## 2023-07-09 PROCEDURE — 36415 COLL VENOUS BLD VENIPUNCTURE: CPT

## 2023-07-09 PROCEDURE — 85025 COMPLETE CBC W/AUTO DIFF WBC: CPT

## 2023-07-09 PROCEDURE — A9270 NON-COVERED ITEM OR SERVICE: HCPCS | Performed by: SURGERY

## 2023-07-09 PROCEDURE — 99232 SBSQ HOSP IP/OBS MODERATE 35: CPT | Performed by: NURSE PRACTITIONER

## 2023-07-09 PROCEDURE — 71045 X-RAY EXAM CHEST 1 VIEW: CPT

## 2023-07-09 PROCEDURE — 770001 HCHG ROOM/CARE - MED/SURG/GYN PRIV*

## 2023-07-09 RX ADMIN — POLYETHYLENE GLYCOL 3350 1 PACKET: 17 POWDER, FOR SOLUTION ORAL at 06:13

## 2023-07-09 RX ADMIN — ZIPRASIDONE HYDROCHLORIDE 60 MG: 60 CAPSULE ORAL at 06:13

## 2023-07-09 RX ADMIN — SENNOSIDES AND DOCUSATE SODIUM 1 TABLET: 50; 8.6 TABLET ORAL at 21:17

## 2023-07-09 RX ADMIN — CARBIDOPA AND LEVODOPA 1 TABLET: 25; 250 TABLET ORAL at 21:21

## 2023-07-09 RX ADMIN — LEVETIRACETAM 500 MG: 500 TABLET, FILM COATED ORAL at 06:14

## 2023-07-09 RX ADMIN — ENOXAPARIN SODIUM 40 MG: 100 INJECTION SUBCUTANEOUS at 21:26

## 2023-07-09 RX ADMIN — LAMOTRIGINE 25 MG: 100 TABLET ORAL at 06:13

## 2023-07-09 RX ADMIN — CARBIDOPA AND LEVODOPA 1 TABLET: 25; 250 TABLET ORAL at 14:01

## 2023-07-09 RX ADMIN — DOCUSATE SODIUM 100 MG: 100 CAPSULE, LIQUID FILLED ORAL at 06:13

## 2023-07-09 RX ADMIN — POLYETHYLENE GLYCOL 3350 1 PACKET: 17 POWDER, FOR SOLUTION ORAL at 17:02

## 2023-07-09 RX ADMIN — OXYCODONE HYDROCHLORIDE 5 MG: 5 TABLET ORAL at 03:17

## 2023-07-09 RX ADMIN — METFORMIN HYDROCHLORIDE 500 MG: 500 TABLET ORAL at 17:01

## 2023-07-09 RX ADMIN — DOCUSATE SODIUM 100 MG: 100 CAPSULE, LIQUID FILLED ORAL at 17:02

## 2023-07-09 RX ADMIN — CARBIDOPA AND LEVODOPA 1 TABLET: 25; 250 TABLET ORAL at 06:13

## 2023-07-09 RX ADMIN — LEVETIRACETAM 500 MG: 500 TABLET, FILM COATED ORAL at 17:01

## 2023-07-09 RX ADMIN — MAGNESIUM HYDROXIDE 30 ML: 1200 LIQUID ORAL at 06:14

## 2023-07-09 RX ADMIN — ZIPRASIDONE HYDROCHLORIDE 60 MG: 60 CAPSULE ORAL at 17:01

## 2023-07-09 RX ADMIN — ENOXAPARIN SODIUM 40 MG: 100 INJECTION SUBCUTANEOUS at 08:47

## 2023-07-09 RX ADMIN — METFORMIN HYDROCHLORIDE 500 MG: 500 TABLET ORAL at 08:47

## 2023-07-09 ASSESSMENT — ENCOUNTER SYMPTOMS
PSYCHIATRIC NEGATIVE: 1
ROS GI COMMENTS: BM 7/7
RESPIRATORY NEGATIVE: 1
NEUROLOGICAL NEGATIVE: 1
MYALGIAS: 1

## 2023-07-09 ASSESSMENT — PAIN DESCRIPTION - PAIN TYPE: TYPE: SURGICAL PAIN

## 2023-07-09 NOTE — PROGRESS NOTES
Trauma / Surgical Daily Progress Note    Date of Service  7/9/2023    Chief Complaint  42 y.o. female admitted 7/7/2023 as a non trauma activation - Fall in bathroom 3 d PTA - Right rib fx 6-9 and pneumothorax.    Interval Events  CXR without pneumo  Chest tube output 590 cc since placement  No air leak  Continue suction   cc with a lot of encouragement     Chest x-ray in the morning.  If stable and output less than 100 cc then will transition to waterseal.  Earliest CT removal 7/11 with medical clearance pending progress.    She does reside at a group home.  She does have a guardian.  She was previously independent.  I have asked case management to reach out to the group home to see what their discharge requirements are such as being fully independent.  I have also put in a rehab consult should she need PT/OT and some tuning up prior to returning to her group home.    Review of Systems  Review of Systems   Constitutional:  Positive for malaise/fatigue.   HENT: Negative.     Respiratory: Negative.     Gastrointestinal:         BM 7/7   Genitourinary:         Voiding   Musculoskeletal:  Positive for myalgias.   Neurological: Negative.    Psychiatric/Behavioral: Negative.     All other systems reviewed and are negative.       Vital Signs  Temp:  [36 °C (96.8 °F)-37.2 °C (99 °F)] 36 °C (96.8 °F)  Pulse:  [74-90] 90  Resp:  [15-18] 15  BP: (102-128)/(64-86) 113/76  SpO2:  [93 %-97 %] 94 %    Physical Exam  Physical Exam  Vitals and nursing note reviewed.   Constitutional:       General: She is not in acute distress.     Appearance: Normal appearance.   HENT:      Right Ear: External ear normal.      Left Ear: External ear normal.      Nose: Nose normal.      Mouth/Throat:      Mouth: Mucous membranes are moist.      Pharynx: Oropharynx is clear.   Eyes:      General:         Right eye: No discharge.         Left eye: No discharge.      Pupils: Pupils are equal, round, and reactive to light.   Pulmonary:       Effort: Pulmonary effort is normal. No respiratory distress.      Comments: Diminished   CT without air leak  Chest:      Chest wall: Tenderness present.   Abdominal:      General: There is no distension.      Palpations: Abdomen is soft.      Tenderness: There is no abdominal tenderness.   Musculoskeletal:         General: No tenderness.      Cervical back: Normal range of motion. No rigidity. No muscular tenderness.   Skin:     General: Skin is warm.      Capillary Refill: Capillary refill takes less than 2 seconds.   Neurological:      General: No focal deficit present.      Mental Status: She is alert and oriented to person, place, and time.   Psychiatric:         Attention and Perception: Attention normal.         Mood and Affect: Affect is labile.         Behavior: Behavior normal.         Thought Content: Thought content normal.         Core Measures & Quality Metrics  Medications reviewed, Labs reviewed and Radiology images reviewed  Tolbert catheter: No Tolbert      DVT Prophylaxis: Enoxaparin (Lovenox)  DVT prophylaxis - mechanical: SCDs  Ulcer prophylaxis: Not indicated    Assessed for rehab: Patient was assess for and/or received rehabilitation services during this hospitalization    RAP Score Total: 6    CAGE Results: negative Blood Alcohol>0.08: no       Assessment/Plan  * Traumatic hemothorax without open wound into thorax, initial encounter- (present on admission)  Assessment & Plan  Small right hemopneumothorax.  Chest tube placed in ED.  Aggressive pulmonary hygiene. Serial chest radiographs.     Fracture four ribs-closed, right, initial encounter- (present on admission)  Assessment & Plan  Right rib fractures 6-9.  Aggressive multimodal pain management, and pulmonary hygiene. Serial chest radiographs.    No contraindication to deep vein thrombosis (DVT) prophylaxis- (present on admission)  Assessment & Plan  Prophylactic dose enoxaparin 40 mg BID initiated upon admission.    Prediabetes- (present on  admission)  Assessment & Plan  Chronic condition treated with metformin.  Resumed maintenance medication on admission.    Parkinson disease (HCC)- (present on admission)  Assessment & Plan  Chronic condition treated with carbidopa-levodopa.  Resumed maintenance medication on admission.    Bipolar 1 disorder, depressed, moderate (HCC)- (present on admission)  Assessment & Plan  Chronic condition treated with Geodon.  Resumed maintenance medication on admission.    Partial epilepsy with impairment of consciousness, intractable (HCC)- (present on admission)  Assessment & Plan  Chronic condition treated with Lamictal and Keppra.  Resumed maintenance medication on admission.    Lives in group home- (present on admission)  Assessment & Plan  Lives in group home  Has guardian    Class 3 severe obesity in adult (HCC)- (present on admission)  Assessment & Plan  BMI 42    Trauma- (present on admission)  Assessment & Plan  Fell in bathroom on 7/4.  Worsening hypoxia and pain.  Non Trauma Activation.  Vale Nieves MD. Trauma Surgery.    Discussed patient condition with RN, Patient, and Dr. Loya .

## 2023-07-09 NOTE — DIETARY
NUTRITION SERVICES: BMI - Pt with BMI >40 (=Body mass index is 42.16 kg/m².), Class III obesity. Weight loss counseling not appropriate in acute care setting. RECOMMEND - If appropriate at DC please refer to outpatient nutrition services for weight management.

## 2023-07-09 NOTE — CARE PLAN
Problem: Hyperinflation  Goal: Prevent or improve atelectasis  Description: Target End Date:  3 to 4 days    1. Instruct incentive spirometry usage  2.  Perform hyperinflation therapy as indicated  Outcome: Not Progressing

## 2023-07-09 NOTE — CARE PLAN
The patient is Watcher - Medium risk of patient condition declining or worsening    Shift Goals  Clinical Goals: monitor chest tube  Patient Goals: comfort  Family Goals: Not present    Progress made toward(s) clinical / shift goals:    Problem: Pain - Standard  Goal: Alleviation of pain or a reduction in pain to the patient’s comfort goal  Outcome: Progressing   Pt denies pain.   Problem: Knowledge Deficit - Standard  Goal: Patient and family/care givers will demonstrate understanding of plan of care, disease process/condition, diagnostic tests and medications  Outcome: Progressing   POC discussed-pt verbalized understanding. Chest tube in place.    Patient is not progressing towards the following goals:

## 2023-07-10 ENCOUNTER — APPOINTMENT (OUTPATIENT)
Dept: RADIOLOGY | Facility: MEDICAL CENTER | Age: 43
DRG: 200 | End: 2023-07-10
Attending: SURGERY
Payer: MEDICARE

## 2023-07-10 LAB
ANION GAP SERPL CALC-SCNC: 10 MMOL/L (ref 7–16)
BASOPHILS # BLD AUTO: 0.5 % (ref 0–1.8)
BASOPHILS # BLD: 0.06 K/UL (ref 0–0.12)
BUN SERPL-MCNC: 7 MG/DL (ref 8–22)
CALCIUM SERPL-MCNC: 8.6 MG/DL (ref 8.5–10.5)
CHLORIDE SERPL-SCNC: 104 MMOL/L (ref 96–112)
CO2 SERPL-SCNC: 26 MMOL/L (ref 20–33)
CREAT SERPL-MCNC: 0.55 MG/DL (ref 0.5–1.4)
EOSINOPHIL # BLD AUTO: 0.44 K/UL (ref 0–0.51)
EOSINOPHIL NFR BLD: 3.5 % (ref 0–6.9)
ERYTHROCYTE [DISTWIDTH] IN BLOOD BY AUTOMATED COUNT: 44 FL (ref 35.9–50)
GFR SERPLBLD CREATININE-BSD FMLA CKD-EPI: 115 ML/MIN/1.73 M 2
GLUCOSE SERPL-MCNC: 86 MG/DL (ref 65–99)
HCT VFR BLD AUTO: 40.8 % (ref 37–47)
HGB BLD-MCNC: 13.1 G/DL (ref 12–16)
IMM GRANULOCYTES # BLD AUTO: 0.05 K/UL (ref 0–0.11)
IMM GRANULOCYTES NFR BLD AUTO: 0.4 % (ref 0–0.9)
LYMPHOCYTES # BLD AUTO: 2.18 K/UL (ref 1–4.8)
LYMPHOCYTES NFR BLD: 17.5 % (ref 22–41)
MCH RBC QN AUTO: 29.6 PG (ref 27–33)
MCHC RBC AUTO-ENTMCNC: 32.1 G/DL (ref 32.2–35.5)
MCV RBC AUTO: 92.3 FL (ref 81.4–97.8)
MONOCYTES # BLD AUTO: 1.06 K/UL (ref 0–0.85)
MONOCYTES NFR BLD AUTO: 8.5 % (ref 0–13.4)
NEUTROPHILS # BLD AUTO: 8.7 K/UL (ref 1.82–7.42)
NEUTROPHILS NFR BLD: 69.6 % (ref 44–72)
NRBC # BLD AUTO: 0 K/UL
NRBC BLD-RTO: 0 /100 WBC (ref 0–0.2)
PLATELET # BLD AUTO: 355 K/UL (ref 164–446)
PMV BLD AUTO: 9.4 FL (ref 9–12.9)
POTASSIUM SERPL-SCNC: 4.4 MMOL/L (ref 3.6–5.5)
RBC # BLD AUTO: 4.42 M/UL (ref 4.2–5.4)
SODIUM SERPL-SCNC: 140 MMOL/L (ref 135–145)
WBC # BLD AUTO: 12.5 K/UL (ref 4.8–10.8)

## 2023-07-10 PROCEDURE — 97535 SELF CARE MNGMENT TRAINING: CPT

## 2023-07-10 PROCEDURE — 97165 OT EVAL LOW COMPLEX 30 MIN: CPT

## 2023-07-10 PROCEDURE — A9270 NON-COVERED ITEM OR SERVICE: HCPCS | Performed by: SURGERY

## 2023-07-10 PROCEDURE — 700102 HCHG RX REV CODE 250 W/ 637 OVERRIDE(OP): Performed by: SURGERY

## 2023-07-10 PROCEDURE — 80048 BASIC METABOLIC PNL TOTAL CA: CPT

## 2023-07-10 PROCEDURE — 770001 HCHG ROOM/CARE - MED/SURG/GYN PRIV*

## 2023-07-10 PROCEDURE — 36415 COLL VENOUS BLD VENIPUNCTURE: CPT

## 2023-07-10 PROCEDURE — 700111 HCHG RX REV CODE 636 W/ 250 OVERRIDE (IP): Mod: JZ | Performed by: NURSE PRACTITIONER

## 2023-07-10 PROCEDURE — 85025 COMPLETE CBC W/AUTO DIFF WBC: CPT

## 2023-07-10 PROCEDURE — 99233 SBSQ HOSP IP/OBS HIGH 50: CPT | Performed by: NURSE PRACTITIONER

## 2023-07-10 PROCEDURE — 97162 PT EVAL MOD COMPLEX 30 MIN: CPT

## 2023-07-10 PROCEDURE — 71045 X-RAY EXAM CHEST 1 VIEW: CPT

## 2023-07-10 RX ADMIN — CARBIDOPA AND LEVODOPA 1 TABLET: 25; 250 TABLET ORAL at 22:40

## 2023-07-10 RX ADMIN — LEVETIRACETAM 500 MG: 500 TABLET, FILM COATED ORAL at 04:29

## 2023-07-10 RX ADMIN — POLYETHYLENE GLYCOL 3350 1 PACKET: 17 POWDER, FOR SOLUTION ORAL at 04:24

## 2023-07-10 RX ADMIN — DOCUSATE SODIUM 100 MG: 100 CAPSULE, LIQUID FILLED ORAL at 04:25

## 2023-07-10 RX ADMIN — CARBIDOPA AND LEVODOPA 1 TABLET: 25; 250 TABLET ORAL at 15:03

## 2023-07-10 RX ADMIN — MAGNESIUM HYDROXIDE 30 ML: 1200 LIQUID ORAL at 04:33

## 2023-07-10 RX ADMIN — CARBIDOPA AND LEVODOPA 1 TABLET: 25; 250 TABLET ORAL at 04:25

## 2023-07-10 RX ADMIN — LEVETIRACETAM 500 MG: 500 TABLET, FILM COATED ORAL at 18:17

## 2023-07-10 RX ADMIN — LAMOTRIGINE 25 MG: 100 TABLET ORAL at 04:31

## 2023-07-10 RX ADMIN — ZIPRASIDONE HYDROCHLORIDE 60 MG: 60 CAPSULE ORAL at 04:29

## 2023-07-10 RX ADMIN — POLYETHYLENE GLYCOL 3350 1 PACKET: 17 POWDER, FOR SOLUTION ORAL at 18:00

## 2023-07-10 RX ADMIN — ENOXAPARIN SODIUM 40 MG: 100 INJECTION SUBCUTANEOUS at 08:39

## 2023-07-10 RX ADMIN — OXYCODONE HYDROCHLORIDE 10 MG: 10 TABLET ORAL at 20:30

## 2023-07-10 RX ADMIN — ZIPRASIDONE HYDROCHLORIDE 60 MG: 60 CAPSULE ORAL at 18:17

## 2023-07-10 RX ADMIN — METFORMIN HYDROCHLORIDE 500 MG: 500 TABLET ORAL at 08:39

## 2023-07-10 RX ADMIN — METFORMIN HYDROCHLORIDE 500 MG: 500 TABLET ORAL at 18:17

## 2023-07-10 RX ADMIN — DOCUSATE SODIUM 100 MG: 100 CAPSULE, LIQUID FILLED ORAL at 18:00

## 2023-07-10 RX ADMIN — ENOXAPARIN SODIUM 40 MG: 100 INJECTION SUBCUTANEOUS at 20:30

## 2023-07-10 ASSESSMENT — PAIN DESCRIPTION - PAIN TYPE
TYPE: SURGICAL PAIN
TYPE: ACUTE PAIN
TYPE: ACUTE PAIN

## 2023-07-10 ASSESSMENT — COGNITIVE AND FUNCTIONAL STATUS - GENERAL
DRESSING REGULAR UPPER BODY CLOTHING: A LITTLE
HELP NEEDED FOR BATHING: A LITTLE
STANDING UP FROM CHAIR USING ARMS: A LITTLE
MOBILITY SCORE: 16
SUGGESTED CMS G CODE MODIFIER DAILY ACTIVITY: CJ
SUGGESTED CMS G CODE MODIFIER MOBILITY: CK
TOILETING: A LITTLE
DAILY ACTIVITIY SCORE: 20
MOVING FROM LYING ON BACK TO SITTING ON SIDE OF FLAT BED: A LITTLE
TURNING FROM BACK TO SIDE WHILE IN FLAT BAD: A LITTLE
DRESSING REGULAR LOWER BODY CLOTHING: A LITTLE
CLIMB 3 TO 5 STEPS WITH RAILING: TOTAL
WALKING IN HOSPITAL ROOM: A LITTLE
MOVING TO AND FROM BED TO CHAIR: A LITTLE

## 2023-07-10 ASSESSMENT — ENCOUNTER SYMPTOMS
RESPIRATORY NEGATIVE: 1
NEUROLOGICAL NEGATIVE: 1
PSYCHIATRIC NEGATIVE: 1
ROS GI COMMENTS: BM 7/7
MYALGIAS: 1

## 2023-07-10 ASSESSMENT — GAIT ASSESSMENTS
DEVIATION: SHUFFLED GAIT;DECREASED HEEL STRIKE;DECREASED TOE OFF
GAIT LEVEL OF ASSIST: STANDBY ASSIST
ASSISTIVE DEVICE: FRONT WHEEL WALKER
DISTANCE (FEET): 40

## 2023-07-10 ASSESSMENT — ACTIVITIES OF DAILY LIVING (ADL): TOILETING: INDEPENDENT

## 2023-07-10 NOTE — DISCHARGE PLANNING
Case Management Discharge Planning    Admission Date: 7/7/2023  GMLOS: 3.1  ALOS: 3    6-Clicks ADL Score: 20  6-Clicks Mobility Score: 16  PT and/or OT Eval ordered: Yes  Post-acute Referrals Ordered: No  Post-acute Choice Obtained: No  Has referral(s) been sent to post-acute provider:  No      Anticipated Discharge Dispo: Discharge Disposition: D/T to facility providing group home/supportive care (04)  Discharge Address: [address] [place], [place] [address] [address]  Discharge Contact Phone Number: [phone] (guardian/Nay Seals)    DME Needed: No    Action(s) Taken: Patient Conference    LSW met with patient at bedside to confirm demographic information and complete assessment. Patient reportedly lived at [facility] and desires to return to the group home once medically cleared. Patient reported to have a guardian/ Nay Seals. LSW thanked patient for information and will follow up with guardian to discuss recommendations to dc with home health.     No other CM needs identified at this time.     Escalations Completed: None    Medically Clear: No    Next Steps: Pending medical clearance. LSW to follow up with Nay Seals & [facility] to determine if they are willing to accept patient back with home health.    Barriers to Discharge: Medical clearance and Outpatient referrals pending    Is the patient up for discharge tomorrow: No

## 2023-07-10 NOTE — CONSULTS
Physical Medicine and Rehabilitation Consultation          Chart review     Date of initial consultation: 7/10/2023  Requesting provider: ordered by JAVIER James at 07/10/23 1227   Consulting provider: Sindi Butterfield D.O.  Reason for consultation: assess for acute inpatient rehab appropriateness  LOS: 3 Day(s)    Chief complaint: s/p fall in shower     HPI: The patient is a 42 y.o.  female with a past medical history of bipolar disorder, seizure disorder and developmental delay;  who presented on 7/7/2023  5:59 PM with rib pain after a fall in the shower. Patient had a fall in the shower at her group home and complained of R sided  pain after the fall. Upon eval in the ED, images showed right sided rib fractures and a small penumothorax. A chute tube was placed in the ED, Patient continue to have pleuravac in place. Patient has been able to participate with therapies, she is functioning near her baseline, but is limited by chest tube       Social Hx:  Patient lives at a group home   0 ANDRY  At prior level of function mod I with FWW     Tobacco: Denies   Alcohol: Denies   Drugs: Denies     THERAPY:  Restrictions: Fall Risk, Chest tube   PT: Functional mobility   7/10  SBA with FWW x 40 ft, SBA sit to stand     OT: ADLs  7/10 SBA bathing, SBA toilet transfers     SLP:   None     IMAGING:  DX-CHEST-PORTABLE (1 VIEW)  Narrative: 7/10/2023 6:15 AM    HISTORY/REASON FOR EXAM:  Abnormal finding in lung field.    TECHNIQUE/EXAM DESCRIPTION AND NUMBER OF VIEWS:  Single portable view of the chest.    COMPARISON: 7/9/2023    FINDINGS:  2 mm right apical pneumothorax. Chest tube unchanged. Stable low lung volumes . Mild bibasilar pleural-parenchymal opacification.  Impression: New, 2 mm trace right apical pneumothorax. Low lung volumes and bibasilar pleural-parenchymal opacification.    PROCEDURES:  7/7 chest tube placement by Dr. Nieves     PMH:  Past Medical History:   Diagnosis Date    Anxiety     Depression      depression     Diabetes     dx 2013 blood sugars not checked at home    Epilepsy (HCC)     seizure free    Mental disability     Sleep apnea     no cpap medicaid took it away due to non compliance    Snoring     Unspecified urinary incontinence     behavioral       PSH:  Past Surgical History:   Procedure Laterality Date    LESION EXCISION GENERAL  2/20/2014    Performed by Lemuel Fernandez M.D. at SURGERY SAME DAY [facility]       FHX:  No family history on file.    Medications:  Current Facility-Administered Medications   Medication Dose    enoxaparin (Lovenox) inj 40 mg  40 mg    Respiratory Therapy Consult      ondansetron (Zofran) syringe/vial injection 4 mg  4 mg    ondansetron (Zofran ODT) dispertab 4 mg  4 mg    docusate sodium (Colace) capsule 100 mg  100 mg    senna-docusate (Pericolace Or Senokot S) 8.6-50 MG per tablet 1 Tablet  1 Tablet    senna-docusate (Pericolace Or Senokot S) 8.6-50 MG per tablet 1 Tablet  1 Tablet    polyethylene glycol/lytes (Miralax) PACKET 1 Packet  1 Packet    magnesium hydroxide (Milk Of Magnesia) suspension 30 mL  30 mL    bisacodyl (Dulcolax) suppository 10 mg  10 mg    sodium phosphate (Fleet) enema 133 mL  1 Each    oxyCODONE immediate-release (Roxicodone) tablet 5 mg  5 mg    Or    oxyCODONE immediate release (Roxicodone) tablet 10 mg  10 mg    carbidopa-levodopa (Sinemet)  MG tablet 1 Tablet  1 Tablet    lamoTRIgine (LaMICtal) tablet 25 mg  25 mg    levETIRAcetam (Keppra) tablet 500 mg  500 mg    metFORMIN (Glucophage) tablet 500 mg  500 mg    ziprasidone (Geodon) capsule 60 mg  60 mg    ipratropium-albuterol (DUONEB) nebulizer solution  3 mL       Allergies:  Allergies   Allergen Reactions    Acetaminophen      Unknown per patient and caregiver    Other Misc      bees    Penicillins Hives     Per patien    Shellfish Allergy Hives     Get hives per patient       Physical Exam:  Vitals: /82   Pulse 95   Temp 36.4 °C (97.6 °F) (Temporal)   Resp 16    "Ht 1.549 m (5' 1\")   Wt 101 kg (223 lb 1.7 oz)   SpO2 94%     Labs: Reviewed and significant for   Recent Labs     07/08/23  0048 07/09/23  0029 07/10/23  0247   RBC 4.49 4.21 4.42   HEMOGLOBIN 13.3 12.6 13.1   HEMATOCRIT 42.2 38.7 40.8   PLATELETCT 352 311 355     Recent Labs     07/08/23  0048 07/09/23  0029 07/10/23  0247   SODIUM 137 137 140   POTASSIUM 4.7 4.1 4.4   CHLORIDE 101 102 104   CO2 25 25 26   GLUCOSE 117* 102* 86   BUN 15 8 7*   CREATININE 0.63 0.50 0.55   CALCIUM 8.9 8.3* 8.6     Recent Results (from the past 24 hour(s))   CBC with Differential: Tomorrow AM    Collection Time: 07/10/23  2:47 AM   Result Value Ref Range    WBC 12.5 (H) 4.8 - 10.8 K/uL    RBC 4.42 4.20 - 5.40 M/uL    Hemoglobin 13.1 12.0 - 16.0 g/dL    Hematocrit 40.8 37.0 - 47.0 %    MCV 92.3 81.4 - 97.8 fL    MCH 29.6 27.0 - 33.0 pg    MCHC 32.1 (L) 32.2 - 35.5 g/dL    RDW 44.0 35.9 - 50.0 fL    Platelet Count 355 164 - 446 K/uL    MPV 9.4 9.0 - 12.9 fL    Neutrophils-Polys 69.60 44.00 - 72.00 %    Lymphocytes 17.50 (L) 22.00 - 41.00 %    Monocytes 8.50 0.00 - 13.40 %    Eosinophils 3.50 0.00 - 6.90 %    Basophils 0.50 0.00 - 1.80 %    Immature Granulocytes 0.40 0.00 - 0.90 %    Nucleated RBC 0.00 0.00 - 0.20 /100 WBC    Neutrophils (Absolute) 8.70 (H) 1.82 - 7.42 K/uL    Lymphs (Absolute) 2.18 1.00 - 4.80 K/uL    Monos (Absolute) 1.06 (H) 0.00 - 0.85 K/uL    Eos (Absolute) 0.44 0.00 - 0.51 K/uL    Baso (Absolute) 0.06 0.00 - 0.12 K/uL    Immature Granulocytes (abs) 0.05 0.00 - 0.11 K/uL    NRBC (Absolute) 0.00 K/uL   Basic Metabolic Panel (BMP): Tomorrow AM    Collection Time: 07/10/23  2:47 AM   Result Value Ref Range    Sodium 140 135 - 145 mmol/L    Potassium 4.4 3.6 - 5.5 mmol/L    Chloride 104 96 - 112 mmol/L    Co2 26 20 - 33 mmol/L    Glucose 86 65 - 99 mg/dL    Bun 7 (L) 8 - 22 mg/dL    Creatinine 0.55 0.50 - 1.40 mg/dL    Calcium 8.6 8.5 - 10.5 mg/dL    Anion Gap 10.0 7.0 - 16.0   ESTIMATED GFR    Collection Time: " 07/10/23  2:47 AM   Result Value Ref Range    GFR (CKD-EPI) 115 >60 mL/min/1.73 m 2         ASSESSMENT:  Patient is a 42 y.o. female admitted with rib fractures and pneumothorax     [facility] Code / Diagnosis to Support: 0010.9 - Pulmonary Disorders: Other Pulmonary    Rehabilitation: Impaired ADLs and mobility  Patient is a poor candidate for inpatient rehab based on near baseline level of function     Barriers to transfer include: Insurance authorization, TCCs to verify disposition, medical clearance and bed availability     Additional Recommendations:  Right sided rib fractures  Pneumothorax   - sustained during fall in shower at group home   - images showed R posterior rib fractures and a right apical pneumothorax   - chest tube in place   - at baseline with therapies, anticipate safe for return to group home with HH after chest tube removed     Developmental delay   - lives at group home at baseline   - has assistance for mobility and ADLs at group home   - agree with plan for home with HH and assistance at group home     History of bipolar disorder   - continue home dose geodon     Dispo:  - patient is currently functioning below their level of baseline, recommend on going therapy   - patient is mostly limited in her mobility due to chest tube, but is near baseline   - agree with therapy for home with HH         Medical Complexity:  Right sided rib fractures  Pneumothorax   Developmental delay   History of bipolar disorder   Impaired mobility and ADLs       DVT PPX: Lovenox       Thank you for allowing us to participate in the care of this patient.     Sindi Butterfield D.O.   Physical Medicine and Rehabilitation     Please note that this dictation was created using voice recognition software. I have made every reasonable attempt to correct obvious errors, but there may be errors of grammar and possibly content that I did not discover before finalizing the note.

## 2023-07-10 NOTE — CARE PLAN
The patient is Stable - Low risk of patient condition declining or worsening    Shift Goals  Clinical Goals: Safety and and chest tube monitorinf  Patient Goals: Sleep  Family Goals: Not present    Progress made toward(s) clinical / shift goals:    Problem: Pain - Standard  Goal: Alleviation of pain or a reduction in pain to the patient’s comfort goal  Outcome: Progressing     Problem: Knowledge Deficit - Standard  Goal: Patient and family/care givers will demonstrate understanding of plan of care, disease process/condition, diagnostic tests and medications  Outcome: Progressing     Problem: Skin Integrity  Goal: Skin integrity is maintained or improved  Outcome: Progressing       Patient is not progressing towards the following goals:

## 2023-07-10 NOTE — THERAPY
Physical Therapy   Initial Evaluation     Patient Name: Erin Santiago  Age:  42 y.o., Sex:  female  Medical Record #: [id number]  Today's Date: 7/10/2023     Precautions  Precautions: (P) Fall Risk;Chest Tube    Assessment  Patient is 42 y.o. female who sustained a fall a her group home with subsequent right side rib fracture and  hydropneumothorax. Pt currently requiring assistance with mobility due to chest tubes and decreased activity tolerance. Pt will benefit from continued PT while in house, recommend return to group home with  follow up .   Plan    Physical Therapy Initial Treatment Plan   Treatment Plan : (P) Bed Mobility, Gait Training, Neuro Re-Education / Balance, Self Care / Home Evaluation, Therapeutic Activities  Treatment Frequency: (P) 3 Times per Week  Duration: (P) Until Therapy Goals Met    DC Equipment Recommendations: (P) None  Discharge Recommendations: (P) Recommend home health for continued physical therapy services         Initial Contact Note    Initial Contact Note Order Received and Verified, Physical Therapy Evaluation in Progress with Full Report to Follow.   Precautions   Precautions Fall Risk;Chest Tube   Vitals   O2 (LPM) 3   O2 Delivery Device Silicone Nasal Cannula   Pain   Intervention Declines   Prior Living Situation   Prior Services None   Housing / Facility Group Home   Bathroom Set up Bathtub / Shower Combination;Grab Bars   Equipment Owned Front-Wheel Walker   Lives with - Patient's Self Care Capacity Attendant / Paid Care Giver   Prior Level of Functional Mobility   Bed Mobility Independent   Transfer Status Independent   Ambulation Independent   Ambulation Distance short household   Assistive Devices Used Front-Wheel Walker   Cognition    Level of Consciousness Alert   Comments pt developmentally delayed. Pleasant and willing to work with therapy   Active ROM Lower Body    Active ROM Lower Body  WDL   Strength Lower Body   Lower Body Strength  WDL   Balance Assessment    Sitting Balance (Static) Good   Sitting Balance (Dynamic) Fair +   Standing Balance (Static) Fair +   Standing Balance (Dynamic) Fair   Weight Shift Sitting Fair   Weight Shift Standing Fair   Comments w/FWW   Bed Mobility    Supine to Sit Supervised   Sit to Supine Supervised   Comments limted by pt stature and  bed height.   Gait Analysis   Gait Level Of Assist Standby Assist   Assistive Device Front Wheel Walker   Distance (Feet) 40   # of Times Distance was Traveled 1   Deviation Shuffled Gait;Decreased Heel Strike;Decreased Toe Off   Comments assist with O2 management and chest tube   Functional Mobility   Sit to Stand Standby Assist   Bed, Chair, Wheelchair Transfer Standby Assist   Transfer Method Stand Step   Mobility supine>EOB> gait in room > BTB   How much difficulty does the patient currently have...   Turning over in bed (including adjusting bedclothes, sheets and blankets)? 3   Sitting down on and standing up from a chair with arms (e.g., wheelchair, bedside commode, etc.) 3   Moving from lying on back to sitting on the side of the bed? 3   How much help from another person does the patient currently need...   Moving to and from a bed to a chair (including a wheelchair)? 3   Need to walk in a hospital room? 3   Climbing 3-5 steps with a railing? 1   6 clicks Mobility Score 16   Short Term Goals    Short Term Goal # 1 Pt will be IND with bed mobility by tx   Short Term Goal # 2 Pt will transfer with FWW a S level for DC home by tx 6   Short Term Goal # 3 Gait with FWW for 100 ft with S by tx 6   Education Group   Education Provided Role of Physical Therapist;Gait Training;Use of Assistive Device   Role of Physical Therapist Patient Response Patient;Acceptance;Explanation;Verbal Demonstration   Gait Training Patient Response Patient;Acceptance;Explanation;Reinforcement Needed   Use of Assistive Device Patient Response Patient;Acceptance;Explanation;Reinforcement Needed   Physical Therapy Initial  Treatment Plan    Treatment Plan  Bed Mobility;Gait Training;Neuro Re-Education / Balance;Self Care / Home Evaluation;Therapeutic Activities   Treatment Frequency 3 Times per Week   Duration Until Therapy Goals Met   Problem List    Problems Decreased Activity Tolerance;Impaired Ambulation;Impaired Transfers;Impaired Bed Mobility   Anticipated Discharge Equipment and Recommendations   DC Equipment Recommendations None   Discharge Recommendations Recommend home health for continued physical therapy services   Interdisciplinary Plan of Care Collaboration   IDT Collaboration with  Nursing;Occupational Therapist   Patient Position at End of Therapy In Bed;Tray Table within Reach;Call Light within Reach   Collaboration Comments Staff updated.

## 2023-07-10 NOTE — PROGRESS NOTES
Trauma / Surgical Daily Progress Note    Date of Service  7/10/2023    Chief Complaint  42 y.o. female admitted 7/7/2023 as a non trauma activation - Fall in bathroom 3 d PTA - Right rib fx 6-9 and pneumothorax.    Interval Events  CXR with tiny apical pneumo  Output ~ 100 cc / Water seal at 1800  IS 1000 cc    Chest x-ray in the morning.  If stable and output less than 100 cc then will DC.  Earliest CT removal 7/11 with medical clearance pending progress    HH ordered   Discussed disposition and planning with Martha Brown    Review of Systems  Review of Systems   Constitutional:  Positive for malaise/fatigue.   HENT: Negative.     Respiratory: Negative.     Gastrointestinal:         BM 7/7   Genitourinary:         Voiding   Musculoskeletal:  Positive for myalgias.   Neurological: Negative.    Psychiatric/Behavioral: Negative.     All other systems reviewed and are negative.       Vital Signs  Temp:  [36.4 °C (97.6 °F)-37.1 °C (98.8 °F)] 36.4 °C (97.6 °F)  Pulse:  [51-95] 95  Resp:  [16-19] 16  BP: (103-116)/(64-82) 112/82  SpO2:  [93 %-100 %] 94 %    Physical Exam  Physical Exam  Vitals and nursing note reviewed.   Constitutional:       General: She is not in acute distress.     Appearance: Normal appearance.   HENT:      Right Ear: External ear normal.      Left Ear: External ear normal.      Nose: Nose normal.      Mouth/Throat:      Mouth: Mucous membranes are moist.      Pharynx: Oropharynx is clear.   Eyes:      General:         Right eye: No discharge.         Left eye: No discharge.      Pupils: Pupils are equal, round, and reactive to light.   Pulmonary:      Effort: Pulmonary effort is normal. No respiratory distress.      Comments: Diminished   CT without air leak  Chest:      Chest wall: Tenderness present.   Abdominal:      General: There is no distension.      Palpations: Abdomen is soft.      Tenderness: There is no abdominal tenderness.   Musculoskeletal:         General: No tenderness.       Cervical back: Normal range of motion. No rigidity. No muscular tenderness.   Skin:     General: Skin is warm.      Capillary Refill: Capillary refill takes less than 2 seconds.   Neurological:      General: No focal deficit present.      Mental Status: She is alert and oriented to person, place, and time.   Psychiatric:         Attention and Perception: Attention normal.         Mood and Affect: Affect is labile.         Behavior: Behavior normal.         Thought Content: Thought content normal.       Core Measures & Quality Metrics  Medications reviewed, Labs reviewed and Radiology images reviewed  Tolbert catheter: No Tolbert      DVT Prophylaxis: Enoxaparin (Lovenox)  DVT prophylaxis - mechanical: SCDs  Ulcer prophylaxis: Not indicated    Assessed for rehab: Patient was assess for and/or received rehabilitation services during this hospitalization    RAP Score Total: 6    CAGE Results: negative Blood Alcohol>0.08: no       Assessment/Plan  * Traumatic hemothorax without open wound into thorax, initial encounter- (present on admission)  Assessment & Plan  Small right hemopneumothorax.  Chest tube placed in ED.  7/9 Pleuravac total 590 cc   Aggressive pulmonary hygiene. Serial chest radiographs.     Fracture four ribs-closed, right, initial encounter- (present on admission)  Assessment & Plan  Right rib fractures 6-9.  Aggressive multimodal pain management, and pulmonary hygiene. Serial chest radiographs.    No contraindication to deep vein thrombosis (DVT) prophylaxis- (present on admission)  Assessment & Plan  Prophylactic dose enoxaparin 40 mg BID initiated upon admission.    Prediabetes- (present on admission)  Assessment & Plan  Chronic condition treated with metformin.  Resumed maintenance medication on admission.    Parkinson disease (HCC)- (present on admission)  Assessment & Plan  Chronic condition treated with carbidopa-levodopa.  Resumed maintenance medication on admission.    Bipolar 1 disorder, depressed,  moderate (HCC)- (present on admission)  Assessment & Plan  Chronic condition treated with Geodon.  Resumed maintenance medication on admission.    Partial epilepsy with impairment of consciousness, intractable (HCC)- (present on admission)  Assessment & Plan  Chronic condition treated with Lamictal and Keppra.  Resumed maintenance medication on admission.    Lives in group home- (present on admission)  Assessment & Plan  Lives in group home  Has guardian    Class 3 severe obesity in adult (HCC)- (present on admission)  Assessment & Plan  BMI 42    Trauma- (present on admission)  Assessment & Plan  Fell in bathroom on 7/4.  Worsening hypoxia and pain.  Non Trauma Activation.  Vale Nieves MD. Trauma Surgery.    Discussed patient condition with RN, Patient, and Dr. Loya .

## 2023-07-10 NOTE — PROGRESS NOTES
Report received from night nurse at 0700. Pt seen at bedside and pt care assumed. PT is A&Ox2, on 3L NC, and denies pain. PIV flushed and intact. PT is medical and not on tele monitor. PT is x2 assist.      Plan of care reviewed with pt, call light, phone, and personal belongings within reach. Bed alarm on, and bed in low locked position. All Pt's needs met at this time.

## 2023-07-10 NOTE — PROGRESS NOTES
4 Eyes Skin Assessment Completed by JAROD Neely and AJROD Ortiz.    Head WDL  Ears WDL  Nose WDL  Mouth WDL  Neck WDL  Breast/Chest chest tube to R chest with dressing  Shoulder Blades WDL  Spine WDL  (R) Arm/Elbow/Hand WDL  (L) Arm/Elbow/Hand WDL  Abdomen WDL  Groin WDL  Scrotum/Coccyx/Buttocks Redness and Blanching  (R) Leg WDL  (L) Leg WDL  (R) Heel/Foot/Toe WDL  (L) Heel/Foot/Toe WDL          Devices In Places Pulse Ox, SCD's, and Nasal Cannula      Interventions In Place Gray Ear Foams, NC W/Ear Foams, TAP System, Pillows, Q2 Turns, Low Air Loss Mattress, and Dri-Edgar Pads    Possible Skin Injury No    Pictures Uploaded Into Epic N/A  Wound Consult Placed N/A  RN Wound Prevention Protocol Ordered No

## 2023-07-10 NOTE — DISCHARGE PLANNING
[facility] Transitional Care Coordination    Referral from: VIVIAN castro    Insurance Provider on Facesheet: MCR/HERON FFS    Potential Rehab Diagnosis: Other Ortho    Chart review indicates patient may have on going medical management and may have therapy needs to possibly meet inpatient rehab facility criteria with the goal of returning to community.    D/C support will need to be verified: TBD    Physiatry consultation pended per protocol.  TX pending.     Thank you for the referral.

## 2023-07-10 NOTE — FACE TO FACE
Face to Face Supporting Documentation - Home Health    The encounter with this patient was in whole or in part the primary reason for home health admission.    Date of encounter:   Patient:                    MRN:                       YOB: 2025  Erin Santiago  [id number]  1980     Home health to see patient for:  Skilled Nursing care for assessment, interventions & education and Physical Therapy evaluation and treatment    Skilled need for:  Comment: Fall and debility and aggressive pulmonary hygiene    Skilled nursing interventions to include:  Comment: pulmonary hygiene    Homebound status evidenced by:  Need the aid of supportive devices such as crutches, canes, wheelchairs or walkers or Needs the assistance of another person in order to leave the home. Leaving home requires a considerable and taxing effort. There is a normal inability to leave the home.    Community Physician to provide follow up care: Xuan Levi M.D.     Optional Interventions? No      I certify the face to face encounter for this home health care referral meets the CMS requirements and the encounter/clinical assessment with the patient was, in whole, or in part, for the medical condition(s) listed above, which is the primary reason for home health care. Based on my clinical findings: the service(s) are medically necessary, support the need for home health care, and the homebound criteria are met.  I certify that this patient has had a face to face encounter by myself.  DARIEL James. - NPI: [id number]

## 2023-07-10 NOTE — THERAPY
"Occupational Therapy   Initial Evaluation     Patient Name: Erin Santiago  Age:  42 y.o., Sex:  female  Medical Record #: [id number]  Today's Date: 7/10/2023     Precautions  Precautions: Fall Risk, Chest Tube    Assessment  Patient is 42 y.o. female admitted to [facility] after she fell in the shower resulting in multiple rib fx (6-10) s/p right tube thoracostomy. PMHx of intellectual disability, bipolar disorder, parkinson's disease, prediabetes, and class 3 severe obesity. During OT eval, pt presented with good activity tolerance, pain control, AROM, endurance. However, pt had difficulty w/ dynamic standing balance which impacts her functional mobility to complete ADLs. Pt would benefit from acute IP OT services 3x/wk to address functional mobility needs in order to increase IND w/ADLs.    Plan  Occupational Therapy Initial Treatment Plan   Duration: Discharge Needs Only    DC Equipment Recommendations: Tub Transfer Bench  Discharge Recommendations: Recommend home health for continued occupational therapy services     Subjective  Pt stated, \"My favorite tv show is Curjose enrique Orozco.\"     Objective   07/10/23 0851   Charge Group   OT Evaluation OT Evaluation Low   OT Self Care / ADL (Units) 1   Total Time Spent   OT Time Spent Yes   OT Self Care / ADL (Minutes) 8   OT Evaluation (Minutes) 19   OT Total Time Spent (Calculated) 27   Initial Contact Note    Initial Contact Note Order Received and Verified, Occupational Therapy Evaluation in Progress with Full Report to Follow.   Prior Living Situation   Housing / Facility Group Home   Bathroom Set up Bathtub / Shower Combination;Grab Bars   Equipment Owned None   Lives with - Patient's Self Care Capacity Attendant / Paid Care Giver   Comments Pt reports that she is IND w/all ADLs   Prior Level of ADL Function   Self Feeding Independent   Grooming / Hygiene Independent   Bathing Independent   Dressing Independent   Toileting Independent   Prior Level of IADL Function "   Prior Level Of Mobility Independent Without Device in Community;Independent Without Device in Home   Leisure Interests Television;Crafts  (Pt enjoys coloring and watching Curious Ernesto. Pt explained that she attends outings with other members of her group home.)   History of Falls   History of Falls No   Precautions   Precautions Fall Risk;Chest Tube   Vitals   Pulse Oximetry 94 %   O2 (LPM) 3   O2 Delivery Device Silicone Nasal Cannula   Pain 0 - 10 Group   Location Orientation Right   Description Sore   Therapist Pain Assessment Post Activity Pain Same as Prior to Activity;Nurse Notified  (Pt reported not being in any pain but that she just feels sore when lifting BUE above her head.)   Cognition    Cognition / Consciousness X   Level of Consciousness Alert   Initiation Impaired   Comments Due to pt's developmental delay, pt required min-mod verbal cues to initiate ADL tasks.   Active ROM Upper Body   Active ROM Upper Body  WDL   Dominant Hand Left   Comments Pt reports that she has pain when she lifts her RUE above her head.   Strength Upper Body   Upper Body Strength  WDL   Neurological Concerns   Neurological Concerns No   Coordination Upper Body   Coordination WDL   Balance Assessment   Sitting Balance (Static) Fair   Sitting Balance (Dynamic) Fair   Standing Balance (Static) Fair   Standing Balance (Dynamic) Fair   Weight Shift Sitting Fair   Weight Shift Standing Fair   Comments Handheld therapist assistance    Bed Mobility    Supine to Sit Supervised   Sit to Supine Standby Assist   Scooting Minimal Assist   Comments Upon OT arrival, pt sitting in semi fowlers position. Pt was able to walk BLE across bed to sit EOB w/SBA. Pt required Alex to scoot anteriorly to EOB.   ADL Assessment   Bathing Standby Assist   Comments Pt was able to stand at bedside w/SBA w/o AD to complete sponge bath. Pt washed chest, axillary area, upper portion of BLE, perineal area, and buttocks.   How much help from another person  does the patient currently need...   Putting on and taking off regular lower body clothing? 3   Bathing (including washing, rinsing, and drying)? 3   Toileting, which includes using a toilet, bedpan, or urinal? 3   Putting on and taking off regular upper body clothing? 3   Taking care of personal grooming such as brushing teeth? 4   Eating meals? 4   6 Clicks Daily Activity Score 20   Functional Mobility   Sit to Stand Standby Assist   Bed, Chair, Wheelchair Transfer Standby Assist   Toilet Transfers Standby Assist   Transfer Method Stand Step   Mobility Pt txf from EOB<BSC via stand step method w/SBA. Pt held onto therapist's hands while side stepping.   Activity Tolerance   Sitting Edge of Bed <4 mins   Standing <3 mins   Comments Pt demonstrated good activliy tolerance as she was able to sit EOB and stand at EOB to complete ADLs.   Education Group   Education Provided Role of Occupational Therapist;Activities of Daily Living   Role of Occupational Therapist Patient Response Patient;Acceptance;Verbal Demonstration   ADL Patient Response Patient;Acceptance;Action Demonstration;Verbal Demonstration   Occupational Therapy Initial Treatment Plan    Duration Until Therapy Goals Met   Short Term Goal #1                      Pt will complete functional ADL txf w/spv   Short Term Goal #2                     Pt will complete grooming task while standing w/spv.   Short Term Goal #3                   Pt will complete simulated tub txf w/spv.   Anticipated Discharge Equipment and Recommendations   DC Equipment Recommendations Tub Transfer Bench   Discharge Recommendations Recommend home health for continued occupational therapy services   Interdisciplinary Plan of Care Collaboration   IDT Collaboration with  Nursing   Patient Position at End of Therapy In Bed;Tray Table within Reach;Call Light within Reach;Phone within Reach   Collaboration Comments RN updated   Session Information   Date / Session Number  7/10 #1 (1/3, 7/16)

## 2023-07-10 NOTE — CARE PLAN
Problem: Pain - Standard  Goal: Alleviation of pain or a reduction in pain to the patient’s comfort goal  Outcome: Progressing     Problem: Knowledge Deficit - Standard  Goal: Patient and family/care givers will demonstrate understanding of plan of care, disease process/condition, diagnostic tests and medications  Outcome: Progressing     Problem: Skin Integrity  Goal: Skin integrity is maintained or improved  Outcome: Progressing   The patient is Stable - Low risk of patient condition declining or worsening    Shift Goals  Clinical Goals: Safety, monitor chest tube  Patient Goals: Rest and comfort  Family Goals: NIKA    Progress made toward(s) clinical / shift goals:      Pt educated on plan of care, pt understands plan of care, reinforcement needed. Pt educated on pain scales, alleviating factors, pain medications and side effects, and need for pain reassessment, reinforcement needed, pain controlled at this time. Pt educated on the need to reposition frequently and remain dry to avoid skin breakdown, reinforcement needed, no skin break down during shift. Fall risk education provided to pt, pt educated about the need to call for assistance while ambulating, reinforcement needed, pt remains free of falls.

## 2023-07-11 ENCOUNTER — HOSPITAL ENCOUNTER (INPATIENT)
Facility: REHABILITATION | Age: 43
End: 2023-07-11
Attending: PHYSICAL MEDICINE & REHABILITATION | Admitting: PHYSICAL MEDICINE & REHABILITATION
Payer: MEDICARE

## 2023-07-11 ENCOUNTER — APPOINTMENT (OUTPATIENT)
Dept: RADIOLOGY | Facility: MEDICAL CENTER | Age: 43
DRG: 200 | End: 2023-07-11
Attending: SURGERY
Payer: MEDICARE

## 2023-07-11 LAB
ANION GAP SERPL CALC-SCNC: 10 MMOL/L (ref 7–16)
BASOPHILS # BLD AUTO: 0.6 % (ref 0–1.8)
BASOPHILS # BLD: 0.08 K/UL (ref 0–0.12)
BUN SERPL-MCNC: 9 MG/DL (ref 8–22)
CALCIUM SERPL-MCNC: 8.4 MG/DL (ref 8.5–10.5)
CHLORIDE SERPL-SCNC: 101 MMOL/L (ref 96–112)
CO2 SERPL-SCNC: 27 MMOL/L (ref 20–33)
CREAT SERPL-MCNC: 0.69 MG/DL (ref 0.5–1.4)
EOSINOPHIL # BLD AUTO: 0.34 K/UL (ref 0–0.51)
EOSINOPHIL NFR BLD: 2.5 % (ref 0–6.9)
ERYTHROCYTE [DISTWIDTH] IN BLOOD BY AUTOMATED COUNT: 43.4 FL (ref 35.9–50)
GFR SERPLBLD CREATININE-BSD FMLA CKD-EPI: 109 ML/MIN/1.73 M 2
GLUCOSE SERPL-MCNC: 93 MG/DL (ref 65–99)
HCT VFR BLD AUTO: 42.9 % (ref 37–47)
HGB BLD-MCNC: 13.9 G/DL (ref 12–16)
IMM GRANULOCYTES # BLD AUTO: 0.07 K/UL (ref 0–0.11)
IMM GRANULOCYTES NFR BLD AUTO: 0.5 % (ref 0–0.9)
LYMPHOCYTES # BLD AUTO: 2.27 K/UL (ref 1–4.8)
LYMPHOCYTES NFR BLD: 17 % (ref 22–41)
MCH RBC QN AUTO: 29.6 PG (ref 27–33)
MCHC RBC AUTO-ENTMCNC: 32.4 G/DL (ref 32.2–35.5)
MCV RBC AUTO: 91.5 FL (ref 81.4–97.8)
MONOCYTES # BLD AUTO: 1.08 K/UL (ref 0–0.85)
MONOCYTES NFR BLD AUTO: 8.1 % (ref 0–13.4)
NEUTROPHILS # BLD AUTO: 9.5 K/UL (ref 1.82–7.42)
NEUTROPHILS NFR BLD: 71.3 % (ref 44–72)
NRBC # BLD AUTO: 0 K/UL
NRBC BLD-RTO: 0 /100 WBC (ref 0–0.2)
PLATELET # BLD AUTO: 423 K/UL (ref 164–446)
PMV BLD AUTO: 9.2 FL (ref 9–12.9)
POTASSIUM SERPL-SCNC: 4.5 MMOL/L (ref 3.6–5.5)
RBC # BLD AUTO: 4.69 M/UL (ref 4.2–5.4)
SODIUM SERPL-SCNC: 138 MMOL/L (ref 135–145)
WBC # BLD AUTO: 13.3 K/UL (ref 4.8–10.8)

## 2023-07-11 PROCEDURE — 71045 X-RAY EXAM CHEST 1 VIEW: CPT

## 2023-07-11 PROCEDURE — 80048 BASIC METABOLIC PNL TOTAL CA: CPT

## 2023-07-11 PROCEDURE — 36415 COLL VENOUS BLD VENIPUNCTURE: CPT

## 2023-07-11 PROCEDURE — 770001 HCHG ROOM/CARE - MED/SURG/GYN PRIV*

## 2023-07-11 PROCEDURE — 99232 SBSQ HOSP IP/OBS MODERATE 35: CPT | Performed by: NURSE PRACTITIONER

## 2023-07-11 PROCEDURE — 700111 HCHG RX REV CODE 636 W/ 250 OVERRIDE (IP): Mod: JZ | Performed by: NURSE PRACTITIONER

## 2023-07-11 PROCEDURE — 85025 COMPLETE CBC W/AUTO DIFF WBC: CPT

## 2023-07-11 PROCEDURE — A9270 NON-COVERED ITEM OR SERVICE: HCPCS | Performed by: SURGERY

## 2023-07-11 PROCEDURE — 700102 HCHG RX REV CODE 250 W/ 637 OVERRIDE(OP): Performed by: SURGERY

## 2023-07-11 RX ADMIN — LEVETIRACETAM 500 MG: 500 TABLET, FILM COATED ORAL at 16:59

## 2023-07-11 RX ADMIN — CARBIDOPA AND LEVODOPA 1 TABLET: 25; 250 TABLET ORAL at 21:37

## 2023-07-11 RX ADMIN — ZIPRASIDONE HYDROCHLORIDE 60 MG: 60 CAPSULE ORAL at 04:36

## 2023-07-11 RX ADMIN — METFORMIN HYDROCHLORIDE 500 MG: 500 TABLET ORAL at 08:54

## 2023-07-11 RX ADMIN — ZIPRASIDONE HYDROCHLORIDE 60 MG: 60 CAPSULE ORAL at 16:59

## 2023-07-11 RX ADMIN — DOCUSATE SODIUM 100 MG: 100 CAPSULE, LIQUID FILLED ORAL at 04:37

## 2023-07-11 RX ADMIN — ENOXAPARIN SODIUM 40 MG: 100 INJECTION SUBCUTANEOUS at 08:54

## 2023-07-11 RX ADMIN — ENOXAPARIN SODIUM 40 MG: 100 INJECTION SUBCUTANEOUS at 21:37

## 2023-07-11 RX ADMIN — DOCUSATE SODIUM 100 MG: 100 CAPSULE, LIQUID FILLED ORAL at 16:58

## 2023-07-11 RX ADMIN — POLYETHYLENE GLYCOL 3350 1 PACKET: 17 POWDER, FOR SOLUTION ORAL at 04:37

## 2023-07-11 RX ADMIN — LAMOTRIGINE 25 MG: 100 TABLET ORAL at 04:36

## 2023-07-11 RX ADMIN — CARBIDOPA AND LEVODOPA 1 TABLET: 25; 250 TABLET ORAL at 14:13

## 2023-07-11 RX ADMIN — LEVETIRACETAM 500 MG: 500 TABLET, FILM COATED ORAL at 04:36

## 2023-07-11 RX ADMIN — METFORMIN HYDROCHLORIDE 500 MG: 500 TABLET ORAL at 16:59

## 2023-07-11 RX ADMIN — CARBIDOPA AND LEVODOPA 1 TABLET: 25; 250 TABLET ORAL at 04:36

## 2023-07-11 RX ADMIN — MAGNESIUM HYDROXIDE 30 ML: 1200 LIQUID ORAL at 04:37

## 2023-07-11 RX ADMIN — POLYETHYLENE GLYCOL 3350 1 PACKET: 17 POWDER, FOR SOLUTION ORAL at 16:58

## 2023-07-11 ASSESSMENT — PAIN DESCRIPTION - PAIN TYPE
TYPE: ACUTE PAIN
TYPE: ACUTE PAIN

## 2023-07-11 ASSESSMENT — ENCOUNTER SYMPTOMS
RESPIRATORY NEGATIVE: 1
MYALGIAS: 1
ROS GI COMMENTS: BM 7/7
NEUROLOGICAL NEGATIVE: 1
PSYCHIATRIC NEGATIVE: 1

## 2023-07-11 NOTE — CARE PLAN
The patient is Watcher - Medium risk of patient condition declining or worsening    Shift Goals  Clinical Goals: skin integrity, rest, pain control, CT I&Os  Patient Goals: rest  Family Goals: NIKA    Progress made toward(s) clinical / shift goals:  Patient reported pain to be managed with PRN and scheduled medications. Educated on pharmacological and non pharmacological modalities. Patient was able to rest intermittently overnight. CT I&OS documented per flowsheets. Skin integrity was maintained with Q2 turns and frequent repositioning when patient agreeable.     Patient is not progressing towards the following goals:

## 2023-07-11 NOTE — PROGRESS NOTES
"Assumed care of patient at 1845. Bedside report received. Assessment complete.  AA&Ox4. Denies CP/SOB.  Reporting 8/10 pain. Medicated per MAR.   Educated patient regarding pharmacologic and non pharmacologic modalities for pain management.  Skin per flowsheet.  R CT set to -20 suction per order. No air leak present at this time.  Tolerating CHO diet. Denies N/V at this time.   + void via purewick. +BM, Last BM 7/7 per report. +flatus.   Pt transfers x1-2 assist.  Q2 turns in place when patient agreeable.   All needs met at this time. Call light within reach. Bed frame alarm on and audible. Pt calls appropriately. Bed low and locked, non skid socks in place. Hourly rounding in place.     BP (!) 116/96   Pulse 93   Temp 37.4 °C (99.3 °F) (Temporal)   Resp 16   Ht 1.549 m (5' 1\")   Wt 101 kg (223 lb 1.7 oz)   LMP  (LMP Unknown)   SpO2 92%   BMI 42.16 kg/m²     "

## 2023-07-11 NOTE — DISCHARGE PLANNING
Per physiatry patient is not a candidate for IPR, anticipate dc back to group home. [facility] will no longer follow.

## 2023-07-11 NOTE — PROGRESS NOTES
Trauma / Surgical Daily Progress Note    Date of Service  7/11/2023    Chief Complaint  42 y.o. female admitted 7/7/2023 as a non trauma activation - Fall in bathroom 3 d PTA - Right rib fx 6-9 and pneumothorax.    Interval Events  CXR with increased pneumo  CT to 30 cm suction  IS 1250 cc with encouragement     Anticipate back to group home with home health.  Home health has been ordered.    Review of Systems  Review of Systems   Constitutional:  Positive for malaise/fatigue.   HENT: Negative.     Respiratory: Negative.     Gastrointestinal:         BM 7/7   Genitourinary:         Voiding   Musculoskeletal:  Positive for myalgias.   Neurological: Negative.    Psychiatric/Behavioral: Negative.     All other systems reviewed and are negative.       Vital Signs  Temp:  [36.6 °C (97.8 °F)-37.4 °C (99.3 °F)] 36.7 °C (98 °F)  Pulse:  [82-93] 86  Resp:  [15-17] 15  BP: ()/(67-96) 112/72  SpO2:  [92 %-96 %] 96 %    Physical Exam  Physical Exam  Vitals and nursing note reviewed.   Constitutional:       General: She is not in acute distress.     Appearance: Normal appearance.   HENT:      Right Ear: External ear normal.      Left Ear: External ear normal.      Nose: Nose normal.      Mouth/Throat:      Mouth: Mucous membranes are moist.      Pharynx: Oropharynx is clear.   Eyes:      General:         Right eye: No discharge.         Left eye: No discharge.      Pupils: Pupils are equal, round, and reactive to light.   Pulmonary:      Effort: Pulmonary effort is normal. No respiratory distress.      Comments: Diminished   CT without air leak  Chest:      Chest wall: Tenderness present.   Abdominal:      General: There is no distension.      Palpations: Abdomen is soft.      Tenderness: There is no abdominal tenderness.   Musculoskeletal:         General: No tenderness.      Cervical back: Normal range of motion. No rigidity. No muscular tenderness.   Skin:     General: Skin is warm.      Capillary Refill: Capillary  refill takes less than 2 seconds.   Neurological:      General: No focal deficit present.      Mental Status: She is alert and oriented to person, place, and time. Mental status is at baseline.   Psychiatric:         Attention and Perception: Attention normal.         Mood and Affect: Affect is labile.         Behavior: Behavior normal.         Thought Content: Thought content normal.       Core Measures & Quality Metrics  Medications reviewed, Labs reviewed and Radiology images reviewed  Tolbert catheter: No Tolbert      DVT Prophylaxis: Enoxaparin (Lovenox)  DVT prophylaxis - mechanical: SCDs  Ulcer prophylaxis: Not indicated    Assessed for rehab: Patient was assess for and/or received rehabilitation services during this hospitalization    RAP Score Total: 6    CAGE Results: negative Blood Alcohol>0.08: no       Assessment/Plan  * Traumatic hemothorax without open wound into thorax, initial encounter- (present on admission)  Assessment & Plan  Small right hemopneumothorax.  Chest tube placed in ED.   7/11 CXR with increased pneumo / no air leak / increased suction to 30 cm  Aggressive pulmonary hygiene. Serial chest radiographs.     Fracture four ribs-closed, right, initial encounter- (present on admission)  Assessment & Plan  Right rib fractures 6-9.  Aggressive multimodal pain management, and pulmonary hygiene. Serial chest radiographs.    No contraindication to deep vein thrombosis (DVT) prophylaxis- (present on admission)  Assessment & Plan  Prophylactic dose enoxaparin 40 mg BID initiated upon admission.    Prediabetes- (present on admission)  Assessment & Plan  Chronic condition treated with metformin.  Resumed maintenance medication on admission.    Parkinson disease (HCC)- (present on admission)  Assessment & Plan  Chronic condition treated with carbidopa-levodopa.  Resumed maintenance medication on admission.    Bipolar 1 disorder, depressed, moderate (HCC)- (present on admission)  Assessment & Plan  Chronic  condition treated with Geodon.  Resumed maintenance medication on admission.    Partial epilepsy with impairment of consciousness, intractable (HCC)- (present on admission)  Assessment & Plan  Chronic condition treated with Lamictal and Keppra.  Resumed maintenance medication on admission.    Lives in group home- (present on admission)  Assessment & Plan  Lives in group home  Has guardian    Class 3 severe obesity in adult (HCC)- (present on admission)  Assessment & Plan  BMI 42    Trauma- (present on admission)  Assessment & Plan  Fell in bathroom on 7/4.  Worsening hypoxia and pain.  Non Trauma Activation.  Vale Nieves MD. Trauma Surgery.    Discussed patient condition with RN, Patient, and Dr. Loya .  .

## 2023-07-11 NOTE — PROGRESS NOTES
Patient has been set up for lunch in bed, she is without complaints of pain or discomfort. Chest tube remains to suction 30 mmg without issue. Will monitor closely.

## 2023-07-12 ENCOUNTER — APPOINTMENT (OUTPATIENT)
Dept: RADIOLOGY | Facility: MEDICAL CENTER | Age: 43
DRG: 200 | End: 2023-07-12
Attending: SURGERY
Payer: MEDICARE

## 2023-07-12 PROBLEM — D72.829 LEUKOCYTOSIS: Status: ACTIVE | Noted: 2023-07-12

## 2023-07-12 LAB
ANION GAP SERPL CALC-SCNC: 13 MMOL/L (ref 7–16)
BASOPHILS # BLD AUTO: 0.7 % (ref 0–1.8)
BASOPHILS # BLD: 0.1 K/UL (ref 0–0.12)
BUN SERPL-MCNC: 12 MG/DL (ref 8–22)
CALCIUM SERPL-MCNC: 8.6 MG/DL (ref 8.5–10.5)
CHLORIDE SERPL-SCNC: 105 MMOL/L (ref 96–112)
CO2 SERPL-SCNC: 24 MMOL/L (ref 20–33)
CREAT SERPL-MCNC: 0.7 MG/DL (ref 0.5–1.4)
EOSINOPHIL # BLD AUTO: 0.45 K/UL (ref 0–0.51)
EOSINOPHIL NFR BLD: 3 % (ref 0–6.9)
ERYTHROCYTE [DISTWIDTH] IN BLOOD BY AUTOMATED COUNT: 43.8 FL (ref 35.9–50)
GFR SERPLBLD CREATININE-BSD FMLA CKD-EPI: 109 ML/MIN/1.73 M 2
GLUCOSE SERPL-MCNC: 99 MG/DL (ref 65–99)
HCT VFR BLD AUTO: 46.3 % (ref 37–47)
HGB BLD-MCNC: 14.7 G/DL (ref 12–16)
IMM GRANULOCYTES # BLD AUTO: 0.11 K/UL (ref 0–0.11)
IMM GRANULOCYTES NFR BLD AUTO: 0.7 % (ref 0–0.9)
LYMPHOCYTES # BLD AUTO: 2.52 K/UL (ref 1–4.8)
LYMPHOCYTES NFR BLD: 17 % (ref 22–41)
MCH RBC QN AUTO: 29.3 PG (ref 27–33)
MCHC RBC AUTO-ENTMCNC: 31.7 G/DL (ref 32.2–35.5)
MCV RBC AUTO: 92.2 FL (ref 81.4–97.8)
MONOCYTES # BLD AUTO: 1.03 K/UL (ref 0–0.85)
MONOCYTES NFR BLD AUTO: 6.9 % (ref 0–13.4)
NEUTROPHILS # BLD AUTO: 10.62 K/UL (ref 1.82–7.42)
NEUTROPHILS NFR BLD: 71.7 % (ref 44–72)
NRBC # BLD AUTO: 0 K/UL
NRBC BLD-RTO: 0 /100 WBC (ref 0–0.2)
PLATELET # BLD AUTO: 430 K/UL (ref 164–446)
PMV BLD AUTO: 9.2 FL (ref 9–12.9)
POTASSIUM SERPL-SCNC: 5.3 MMOL/L (ref 3.6–5.5)
RBC # BLD AUTO: 5.02 M/UL (ref 4.2–5.4)
SODIUM SERPL-SCNC: 142 MMOL/L (ref 135–145)
WBC # BLD AUTO: 14.8 K/UL (ref 4.8–10.8)

## 2023-07-12 PROCEDURE — A9270 NON-COVERED ITEM OR SERVICE: HCPCS | Performed by: SURGERY

## 2023-07-12 PROCEDURE — 85025 COMPLETE CBC W/AUTO DIFF WBC: CPT

## 2023-07-12 PROCEDURE — 97535 SELF CARE MNGMENT TRAINING: CPT

## 2023-07-12 PROCEDURE — 770001 HCHG ROOM/CARE - MED/SURG/GYN PRIV*

## 2023-07-12 PROCEDURE — 97112 NEUROMUSCULAR REEDUCATION: CPT

## 2023-07-12 PROCEDURE — 94669 MECHANICAL CHEST WALL OSCILL: CPT

## 2023-07-12 PROCEDURE — 97530 THERAPEUTIC ACTIVITIES: CPT

## 2023-07-12 PROCEDURE — 700111 HCHG RX REV CODE 636 W/ 250 OVERRIDE (IP): Mod: JZ | Performed by: NURSE PRACTITIONER

## 2023-07-12 PROCEDURE — 71045 X-RAY EXAM CHEST 1 VIEW: CPT

## 2023-07-12 PROCEDURE — 99232 SBSQ HOSP IP/OBS MODERATE 35: CPT

## 2023-07-12 PROCEDURE — 700102 HCHG RX REV CODE 250 W/ 637 OVERRIDE(OP): Performed by: SURGERY

## 2023-07-12 PROCEDURE — 36415 COLL VENOUS BLD VENIPUNCTURE: CPT

## 2023-07-12 PROCEDURE — 80048 BASIC METABOLIC PNL TOTAL CA: CPT

## 2023-07-12 RX ORDER — BISACODYL 10 MG
10 SUPPOSITORY, RECTAL RECTAL ONCE
Status: DISCONTINUED | OUTPATIENT
Start: 2023-07-12 | End: 2023-07-13

## 2023-07-12 RX ADMIN — CARBIDOPA AND LEVODOPA 1 TABLET: 25; 250 TABLET ORAL at 22:27

## 2023-07-12 RX ADMIN — POLYETHYLENE GLYCOL 3350 1 PACKET: 17 POWDER, FOR SOLUTION ORAL at 05:44

## 2023-07-12 RX ADMIN — METFORMIN HYDROCHLORIDE 500 MG: 500 TABLET ORAL at 05:43

## 2023-07-12 RX ADMIN — DOCUSATE SODIUM 100 MG: 100 CAPSULE, LIQUID FILLED ORAL at 05:43

## 2023-07-12 RX ADMIN — CARBIDOPA AND LEVODOPA 1 TABLET: 25; 250 TABLET ORAL at 14:59

## 2023-07-12 RX ADMIN — ENOXAPARIN SODIUM 40 MG: 100 INJECTION SUBCUTANEOUS at 22:26

## 2023-07-12 RX ADMIN — CARBIDOPA AND LEVODOPA 1 TABLET: 25; 250 TABLET ORAL at 05:43

## 2023-07-12 RX ADMIN — ZIPRASIDONE HYDROCHLORIDE 60 MG: 60 CAPSULE ORAL at 18:16

## 2023-07-12 RX ADMIN — LAMOTRIGINE 25 MG: 100 TABLET ORAL at 05:43

## 2023-07-12 RX ADMIN — ENOXAPARIN SODIUM 40 MG: 100 INJECTION SUBCUTANEOUS at 08:24

## 2023-07-12 RX ADMIN — LEVETIRACETAM 500 MG: 500 TABLET, FILM COATED ORAL at 18:16

## 2023-07-12 RX ADMIN — MAGNESIUM HYDROXIDE 30 ML: 1200 LIQUID ORAL at 05:44

## 2023-07-12 RX ADMIN — LEVETIRACETAM 500 MG: 500 TABLET, FILM COATED ORAL at 05:44

## 2023-07-12 RX ADMIN — ZIPRASIDONE HYDROCHLORIDE 60 MG: 60 CAPSULE ORAL at 05:44

## 2023-07-12 RX ADMIN — METFORMIN HYDROCHLORIDE 500 MG: 500 TABLET ORAL at 18:16

## 2023-07-12 ASSESSMENT — PAIN DESCRIPTION - PAIN TYPE
TYPE: ACUTE PAIN
TYPE: ACUTE PAIN

## 2023-07-12 ASSESSMENT — COGNITIVE AND FUNCTIONAL STATUS - GENERAL
DRESSING REGULAR LOWER BODY CLOTHING: A LOT
DAILY ACTIVITIY SCORE: 17
DRESSING REGULAR UPPER BODY CLOTHING: A LITTLE
HELP NEEDED FOR BATHING: A LOT
TOILETING: A LOT
SUGGESTED CMS G CODE MODIFIER DAILY ACTIVITY: CK

## 2023-07-12 NOTE — CARE PLAN
The patient is Stable - Low risk of patient condition declining or worsening    Shift Goals  Clinical Goals: monitor chest tube  Patient Goals: feel better  Family Goals: NIKA    Progress made toward(s) clinical / shift goals:      Problem: Pain - Standard  Goal: Alleviation of pain or a reduction in pain to the patient’s comfort goal  Outcome: Progressing     Problem: Knowledge Deficit - Standard  Goal: Patient and family/care givers will demonstrate understanding of plan of care, disease process/condition, diagnostic tests and medications  Outcome: Progressing     Problem: Skin Integrity  Goal: Skin integrity is maintained or improved  Outcome: Progressing       Patient is not progressing towards the following goals:

## 2023-07-12 NOTE — DISCHARGE PLANNING
Received Choice form @: 2953  Agency/Facility Name: [facility]  Referral sent per Choice form @: 9425

## 2023-07-12 NOTE — PROGRESS NOTES
Trauma / Surgical Daily Progress Note    Date of Service  7/12/2023    Chief Complaint  42 y.o. female admitted 7/7/2023 with right rib fractures & right hemopneumothorax after sustaining a fall in the bathroom.    Interval Events  Chest tube suction increased to - 30 cm yesterday, AM chest xray with resolved right pneumothorax.  Chest tube with 130 mL of serosanguinous output over last 24 hours & no air leak appreciated on exam.  Poor incentive spirometer use at 500 and tolerating room air.  WBC increased to 14.8 from 13.3 and remains afebrile.    - Decrease suction to - 20 cm  - Continue aggressive pulmonary hygiene, PEP ordered QID  - Repeat chest xray in AM  - Urinalysis ordered to rule out UTI  - Administer suppository & continue scheduled bowel regimen  - Disposition: PT and OT recommend home health. Home health referral pending. Anticipate discharge back to assisted with home health once medically cleared.    Review of Systems  Review of Systems   Constitutional:  Positive for malaise/fatigue. Negative for chills, diaphoresis and fever.   HENT: Negative.     Eyes: Negative.    Respiratory:  Negative for cough and shortness of breath.    Cardiovascular: Negative.    Gastrointestinal: Negative.         Last BM prior to admission   Genitourinary: Negative.    Musculoskeletal:  Positive for myalgias (right chest wall).   Neurological: Negative.       Vital Signs  Temp:  [36.4 °C (97.6 °F)-37.1 °C (98.8 °F)] 36.4 °C (97.6 °F)  Pulse:  [83-98] 95  Resp:  [15-17] 16  BP: (104-124)/(65-79) 124/65  SpO2:  [91 %-96 %] 96 %    Physical Exam  Physical Exam  Vitals reviewed.   Constitutional:       General: She is not in acute distress.     Appearance: She is not toxic-appearing or diaphoretic.   Cardiovascular:      Rate and Rhythm: Normal rate and regular rhythm.      Heart sounds: Normal heart sounds.   Pulmonary:      Effort: Pulmonary effort is normal. No respiratory distress.      Breath sounds: Normal breath  sounds.   Chest:      Chest wall: Tenderness (right chest wall) present.      Comments: Right chest tube intact without air leak appreciated on exam. Serous drainage noted in tubing.  Abdominal:      General: There is no distension.      Palpations: Abdomen is soft.      Tenderness: There is no abdominal tenderness.   Genitourinary:     Comments: Purewick in place with concentrated foul smelling yellow urine.  Musculoskeletal:      Cervical back: No muscular tenderness.   Skin:     General: Skin is warm and dry.   Neurological:      Mental Status: She is alert and oriented to person, place, and time. Mental status is at baseline.   Psychiatric:         Attention and Perception: Attention normal.       Core Measures & Quality Metrics  Medications reviewed, Labs reviewed and Radiology images reviewed  Tolbert catheter: No Tolbert      DVT Prophylaxis: Enoxaparin (Lovenox)  DVT prophylaxis - mechanical: SCDs  Ulcer prophylaxis: Not indicated    Assessed for rehab: Patient was assess for and/or received rehabilitation services during this hospitalization    RAP Score Total: 6    CAGE Results: negative Blood Alcohol>0.08: no       Assessment/Plan  * Traumatic hemothorax without open wound into thorax, initial encounter- (present on admission)  Assessment & Plan  Small right hemopneumothorax.  7/7 Chest tube placed in ED.   7/11 Chest xray with increased pneumo without air leak. Increased suction to - 30 cm.  7/12 Chest xray with resolved right pneumothorax. Chest tube with 130 mL of serosanguinous output over last 24 hours. Decreased suction to - 20 cm.  Aggressive pulmonary hygiene and serial chest radiography.    Leukocytosis  Assessment & Plan  7/12 WBC increased to 14.8. Patient afebrile and tolerating room air.  - Chest xray with stable atelectasis.  - Urinalysis ordered & pending.    Lives in group [place]- (present on admission)  Assessment & Plan  Lives at [facility].  7/12 Home health referral sent &  pending.    Fracture four ribs-closed, right, initial encounter- (present on admission)  Assessment & Plan  Right 6th, 7th, and 8th right posterior rib fractures and possibly 9th posterior rib fracture.  Aggressive pulmonary hygiene and multimodal pain management and serial chest radiography.    No contraindication to deep vein thrombosis (DVT) prophylaxis- (present on admission)  Assessment & Plan  Prophylactic dose enoxaparin 40 mg BID initiated upon admission.    Prediabetes- (present on admission)  Assessment & Plan  Chronic condition treated with metformin.  Resumed maintenance medication on admission.    Parkinson disease (HCC)- (present on admission)  Assessment & Plan  Chronic condition treated with carbidopa-levodopa.  Resumed maintenance medication on admission.    Bipolar 1 disorder, depressed, moderate (HCC)- (present on admission)  Assessment & Plan  Chronic condition treated with Geodon.  Resumed maintenance medication on admission.    Partial epilepsy with impairment of consciousness, intractable ([facility])- (present on admission)  Assessment & Plan  Chronic condition treated with Lamictal and Keppra.  Resumed maintenance medication on admission.    Class 3 severe obesity in adult (HCC)- (present on admission)  Assessment & Plan  BMI 42.16    Trauma- (present on admission)  Assessment & Plan  Fell in bathroom on 7/4 and discharged home from ED.   Presented back to ER with worsening pain.  Non Trauma Activation.  Vale Nieves MD. Trauma Surgery.    Discussed patient condition with RN, , Patient, and trauma surgery.

## 2023-07-12 NOTE — CARE PLAN
Bedside report received. Assumed care of patient this evening. Assessment completed        Patient is A&O x4  Reports 0/10 pain, will continue to monitor  Pt is on RA, oxygen saturation 93%  Voiding- perwick in place  Flatus         Plan of care reviewed with the patient. Bed is locked and in the lowest position. Call light is within reach. Patient encouraged to voice needs and concerns, all needs met at this time. Hourly rounding in place.       The patient is Stable - Low risk of patient condition declining or worsening    Shift Goals  Clinical Goals: rest, chest tube output  Patient Goals: rest  Family Goals: NA    Progress made toward(s) clinical / shift goals:    Problem: Pain - Standard  Goal: Alleviation of pain or a reduction in pain to the patient’s comfort goal  Outcome: Progressing     Problem: Knowledge Deficit - Standard  Goal: Patient and family/care givers will demonstrate understanding of plan of care, disease process/condition, diagnostic tests and medications  Outcome: Progressing     Problem: Skin Integrity  Goal: Skin integrity is maintained or improved  Outcome: Progressing     Problem: Fall Risk  Goal: Patient will remain free from falls  Outcome: Progressing

## 2023-07-12 NOTE — PROGRESS NOTES
Assessment completed. Pt A&Ox2-4. Respirations are even and unlabored on RA. Pt denies pain at this time. Monitors applied, VS stable, call light and belongings within reach. POC updated (monitor chest tube, pain control). Pt educated on room and call light, pt verbalized understanding. Communication board updated. Needs met.

## 2023-07-12 NOTE — ASSESSMENT & PLAN NOTE
7/12 WBC increased to 14.8. Patient afebrile and tolerating room air.  - Chest xray with stable atelectasis.  - Urinalysis positive   - Bilateral lower extremity duplex negative  7/15 Transitioned to Macrobid

## 2023-07-12 NOTE — DISCHARGE PLANNING
Care Transition Team Assessment    Information Source  Orientation Level: Disoriented to time  Information Given By: Legal Guardian  Informant's Name: Nay Seals  Who is responsible for making decisions for patient? : Guardian    Readmission Evaluation  Is this a readmission?: No    Elopement Risk  Legal Hold: No  Ambulatory or Self Mobile in Wheelchair: No-Not an Elopement Risk  Disoriented: No  Psychiatric Symptoms: None  History of Wandering: No  Elopement this Admit: No  Vocalizing Wanting to Leave: No  Displays Behaviors, Body Language Wanting to Leave: No-Not at Risk for Elopement  Elopement Risk: Not at Risk for Elopement    Interdisciplinary Discharge Planning  Does Admitting Nurse Feel This Could be a Complex Discharge?: No  Primary Care Physician: CYNTHIA MONTALVO*  Lives with - Patient's Self Care Capacity: Other (Comments) ([facility]  Housing / Facility: Group Home  Mobility Issues: Yes (Patient has Parkinson, uses a FWW)  Prior Services: None  Patient Prefers to be Discharged to:: [facility]  Assistance Needed: Yes  Durable Medical Equipment: Walker    Discharge Preparedness  What is your plan after discharge?: Group home ([facility])  What are your discharge supports?: Guardian, Other (comment) (Caregivers)     Finances  Financial Barriers to Discharge: No  Prescription Coverage: Yes    Vision / Hearing Impairment  Vision Impairment : Yes  Hearing Impairment : No     Advance Directive  Advance Directive?: None (Per the Guardian Nay Seals, she  is not allowed to make end of life decisions for the patient.)  Advance Directive offered?: AD Booklet refused (Per the guardian the sister Rea Quesada would make the catastrophic decisions, but the guardian make her decisions at this time.)    Domestic Abuse  Have you ever been the victim of abuse or violence?: No  Physical Abuse or Sexual Abuse: No  Verbal Abuse or Emotional Abuse: No  Possible  Abuse/Neglect Reported to:: Not Applicable    Psychological Assessment  History of Substance Abuse: None  History of Psychiatric Problems: Yes (Per the guardian, the patient has anger issues but landa not take medications for it.)  Non-compliant with Treatment: No      Anticipated Discharge Information  Discharge Disposition: D/T to facility providing long-term/supportive care (04)  Discharge Address: [address] [address]  Discharge Contact Phone Number: [phone] (guardian/Nay Seals)    POLI received a call from Judit Daley,  for [facility] @ [phone] and she identified herself as part of the provider agency for [facility].   CM also received a voicemail from Nay Rodriguez who identified herself as the patient's guardian with the group home.   CM reached out to the guardian of the patient. CM spoke with Nay Seals. CM interviewed the guardian for information for the interview. Per Nay her group home has used [email or web address] and it is her choice for the home health company that the patient will need on discharge.   [email or web address] has accepted the patient for home health care.      Dutasteride Male Counseling: Dustasteride Counseling:  I discussed with the patient the risks of use of dutasteride including but not limited to decreased libido, decreased ejaculate volume, and gynecomastia. Women who can become pregnant should not handle medication.  All of the patient's questions and concerns were addressed. Dutasteride Counseling: Dustasteride Counseling:  I discussed with the patient the risks of use of dutasteride including but not limited to decreased libido, decreased ejaculate volume, and gynecomastia. Women who can become pregnant should not handle medication.  All of the patient's questions and concerns were addressed.

## 2023-07-13 ENCOUNTER — APPOINTMENT (OUTPATIENT)
Dept: RADIOLOGY | Facility: MEDICAL CENTER | Age: 43
DRG: 200 | End: 2023-07-13
Payer: MEDICARE

## 2023-07-13 ENCOUNTER — APPOINTMENT (OUTPATIENT)
Dept: RADIOLOGY | Facility: MEDICAL CENTER | Age: 43
DRG: 200 | End: 2023-07-13
Attending: SURGERY
Payer: MEDICARE

## 2023-07-13 LAB
ANION GAP SERPL CALC-SCNC: 12 MMOL/L (ref 7–16)
APPEARANCE UR: CLEAR
BACTERIA #/AREA URNS HPF: ABNORMAL /HPF
BASOPHILS # BLD AUTO: 0.8 % (ref 0–1.8)
BASOPHILS # BLD: 0.12 K/UL (ref 0–0.12)
BILIRUB UR QL STRIP.AUTO: NEGATIVE
BUN SERPL-MCNC: 12 MG/DL (ref 8–22)
CALCIUM SERPL-MCNC: 8.6 MG/DL (ref 8.5–10.5)
CHLORIDE SERPL-SCNC: 103 MMOL/L (ref 96–112)
CO2 SERPL-SCNC: 23 MMOL/L (ref 20–33)
COLOR UR: YELLOW
CREAT SERPL-MCNC: 0.6 MG/DL (ref 0.5–1.4)
EOSINOPHIL # BLD AUTO: 0.46 K/UL (ref 0–0.51)
EOSINOPHIL NFR BLD: 3.1 % (ref 0–6.9)
EPI CELLS #/AREA URNS HPF: NEGATIVE /HPF
ERYTHROCYTE [DISTWIDTH] IN BLOOD BY AUTOMATED COUNT: 43 FL (ref 35.9–50)
GFR SERPLBLD CREATININE-BSD FMLA CKD-EPI: 113 ML/MIN/1.73 M 2
GLUCOSE SERPL-MCNC: 115 MG/DL (ref 65–99)
GLUCOSE UR STRIP.AUTO-MCNC: NEGATIVE MG/DL
HCT VFR BLD AUTO: 46.4 % (ref 37–47)
HGB BLD-MCNC: 15.2 G/DL (ref 12–16)
HYALINE CASTS #/AREA URNS LPF: ABNORMAL /LPF
IMM GRANULOCYTES # BLD AUTO: 0.12 K/UL (ref 0–0.11)
IMM GRANULOCYTES NFR BLD AUTO: 0.8 % (ref 0–0.9)
KETONES UR STRIP.AUTO-MCNC: NEGATIVE MG/DL
LEUKOCYTE ESTERASE UR QL STRIP.AUTO: ABNORMAL
LYMPHOCYTES # BLD AUTO: 2.42 K/UL (ref 1–4.8)
LYMPHOCYTES NFR BLD: 16.6 % (ref 22–41)
MCH RBC QN AUTO: 29.8 PG (ref 27–33)
MCHC RBC AUTO-ENTMCNC: 32.8 G/DL (ref 32.2–35.5)
MCV RBC AUTO: 91 FL (ref 81.4–97.8)
MICRO URNS: ABNORMAL
MONOCYTES # BLD AUTO: 1.07 K/UL (ref 0–0.85)
MONOCYTES NFR BLD AUTO: 7.3 % (ref 0–13.4)
NEUTROPHILS # BLD AUTO: 10.43 K/UL (ref 1.82–7.42)
NEUTROPHILS NFR BLD: 71.4 % (ref 44–72)
NITRITE UR QL STRIP.AUTO: NEGATIVE
NRBC # BLD AUTO: 0 K/UL
NRBC BLD-RTO: 0 /100 WBC (ref 0–0.2)
PH UR STRIP.AUTO: 7 [PH] (ref 5–8)
PLATELET # BLD AUTO: 434 K/UL (ref 164–446)
PMV BLD AUTO: 9.1 FL (ref 9–12.9)
POTASSIUM SERPL-SCNC: 4.6 MMOL/L (ref 3.6–5.5)
PROT UR QL STRIP: NEGATIVE MG/DL
RBC # BLD AUTO: 5.1 M/UL (ref 4.2–5.4)
RBC # URNS HPF: ABNORMAL /HPF
RBC UR QL AUTO: NEGATIVE
SODIUM SERPL-SCNC: 138 MMOL/L (ref 135–145)
SP GR UR STRIP.AUTO: 1.01
UROBILINOGEN UR STRIP.AUTO-MCNC: 0.2 MG/DL
WBC # BLD AUTO: 14.6 K/UL (ref 4.8–10.8)
WBC #/AREA URNS HPF: ABNORMAL /HPF

## 2023-07-13 PROCEDURE — 97116 GAIT TRAINING THERAPY: CPT

## 2023-07-13 PROCEDURE — 36415 COLL VENOUS BLD VENIPUNCTURE: CPT

## 2023-07-13 PROCEDURE — 99232 SBSQ HOSP IP/OBS MODERATE 35: CPT

## 2023-07-13 PROCEDURE — 85025 COMPLETE CBC W/AUTO DIFF WBC: CPT

## 2023-07-13 PROCEDURE — 770001 HCHG ROOM/CARE - MED/SURG/GYN PRIV*

## 2023-07-13 PROCEDURE — A9270 NON-COVERED ITEM OR SERVICE: HCPCS | Performed by: SURGERY

## 2023-07-13 PROCEDURE — 700111 HCHG RX REV CODE 636 W/ 250 OVERRIDE (IP): Mod: JZ | Performed by: NURSE PRACTITIONER

## 2023-07-13 PROCEDURE — 700102 HCHG RX REV CODE 250 W/ 637 OVERRIDE(OP): Performed by: SURGERY

## 2023-07-13 PROCEDURE — 93970 EXTREMITY STUDY: CPT

## 2023-07-13 PROCEDURE — 80048 BASIC METABOLIC PNL TOTAL CA: CPT

## 2023-07-13 PROCEDURE — 81001 URINALYSIS AUTO W/SCOPE: CPT

## 2023-07-13 PROCEDURE — 97530 THERAPEUTIC ACTIVITIES: CPT

## 2023-07-13 PROCEDURE — 71045 X-RAY EXAM CHEST 1 VIEW: CPT

## 2023-07-13 RX ORDER — OXYCODONE HYDROCHLORIDE 5 MG/1
5 TABLET ORAL
Status: DISCONTINUED | OUTPATIENT
Start: 2023-07-13 | End: 2023-07-17 | Stop reason: HOSPADM

## 2023-07-13 RX ORDER — OXYCODONE HYDROCHLORIDE 5 MG/1
2.5 TABLET ORAL
Status: DISCONTINUED | OUTPATIENT
Start: 2023-07-13 | End: 2023-07-17 | Stop reason: HOSPADM

## 2023-07-13 RX ADMIN — CARBIDOPA AND LEVODOPA 1 TABLET: 25; 250 TABLET ORAL at 20:55

## 2023-07-13 RX ADMIN — ZIPRASIDONE HYDROCHLORIDE 60 MG: 60 CAPSULE ORAL at 06:29

## 2023-07-13 RX ADMIN — DOCUSATE SODIUM 100 MG: 100 CAPSULE, LIQUID FILLED ORAL at 18:05

## 2023-07-13 RX ADMIN — LEVETIRACETAM 500 MG: 500 TABLET, FILM COATED ORAL at 06:29

## 2023-07-13 RX ADMIN — ZIPRASIDONE HYDROCHLORIDE 60 MG: 60 CAPSULE ORAL at 18:06

## 2023-07-13 RX ADMIN — ENOXAPARIN SODIUM 40 MG: 100 INJECTION SUBCUTANEOUS at 10:11

## 2023-07-13 RX ADMIN — CARBIDOPA AND LEVODOPA 1 TABLET: 25; 250 TABLET ORAL at 13:45

## 2023-07-13 RX ADMIN — LAMOTRIGINE 25 MG: 100 TABLET ORAL at 06:30

## 2023-07-13 RX ADMIN — LEVETIRACETAM 500 MG: 500 TABLET, FILM COATED ORAL at 18:04

## 2023-07-13 RX ADMIN — CARBIDOPA AND LEVODOPA 1 TABLET: 25; 250 TABLET ORAL at 06:29

## 2023-07-13 RX ADMIN — METFORMIN HYDROCHLORIDE 500 MG: 500 TABLET ORAL at 06:30

## 2023-07-13 RX ADMIN — ENOXAPARIN SODIUM 40 MG: 100 INJECTION SUBCUTANEOUS at 20:55

## 2023-07-13 RX ADMIN — METFORMIN HYDROCHLORIDE 500 MG: 500 TABLET ORAL at 18:04

## 2023-07-13 ASSESSMENT — COGNITIVE AND FUNCTIONAL STATUS - GENERAL
CLIMB 3 TO 5 STEPS WITH RAILING: TOTAL
WALKING IN HOSPITAL ROOM: A LITTLE
STANDING UP FROM CHAIR USING ARMS: A LITTLE
TURNING FROM BACK TO SIDE WHILE IN FLAT BAD: A LITTLE
MOBILITY SCORE: 16
SUGGESTED CMS G CODE MODIFIER MOBILITY: CK
MOVING TO AND FROM BED TO CHAIR: A LITTLE
MOVING FROM LYING ON BACK TO SITTING ON SIDE OF FLAT BED: A LITTLE

## 2023-07-13 ASSESSMENT — ENCOUNTER SYMPTOMS
COUGH: 0
NEUROLOGICAL NEGATIVE: 1
CHILLS: 0
SHORTNESS OF BREATH: 0
GASTROINTESTINAL NEGATIVE: 1
DIAPHORESIS: 0
MYALGIAS: 1
RESPIRATORY NEGATIVE: 1
ROS GI COMMENTS: LAST BM 7/12
FEVER: 0
CARDIOVASCULAR NEGATIVE: 1
EYES NEGATIVE: 1

## 2023-07-13 ASSESSMENT — GAIT ASSESSMENTS
DISTANCE (FEET): 150
DEVIATION: SHUFFLED GAIT;DECREASED HEEL STRIKE;DECREASED TOE OFF
GAIT LEVEL OF ASSIST: CONTACT GUARD ASSIST
ASSISTIVE DEVICE: FRONT WHEEL WALKER

## 2023-07-13 ASSESSMENT — PAIN DESCRIPTION - PAIN TYPE: TYPE: ACUTE PAIN

## 2023-07-13 NOTE — CARE PLAN
The patient is Stable - Low risk of patient condition declining or worsening      Problem: Knowledge Deficit - Standard  Goal: Patient and family/care givers will demonstrate understanding of plan of care, disease process/condition, diagnostic tests and medications  Outcome: Progressing  Patient updated on POC. All questions answered.       Problem: Bowel Elimination  Goal: Establish and maintain regular bowel function  Outcome: Progressing  Patient had a large BM 7/13

## 2023-07-13 NOTE — CARE PLAN
Plan of care discussed with patient, A&Ox4. Patient is being monitored and medicated for pain per the MAR. Bed is locked and in lowest position, call light and belongings within reach. Hourly rounding in place.       The patient is Stable - Low risk of patient condition declining or worsening    Shift Goals  Clinical Goals: monitor chest tube, rest  Patient Goals: feel better  Family Goals: NA    Progress made toward(s) clinical / shift goals:    Problem: Pain - Standard  Goal: Alleviation of pain or a reduction in pain to the patient’s comfort goal  Outcome: Progressing     Problem: Knowledge Deficit - Standard  Goal: Patient and family/care givers will demonstrate understanding of plan of care, disease process/condition, diagnostic tests and medications  Outcome: Progressing     Problem: Skin Integrity  Goal: Skin integrity is maintained or improved  Outcome: Progressing     Problem: Fall Risk  Goal: Patient will remain free from falls  Outcome: Progressing       Patient is not progressing towards the following goals:

## 2023-07-13 NOTE — PROGRESS NOTES
Trauma / Surgical Daily Progress Note    Date of Service  7/13/2023    Chief Complaint  42 y.o. female admitted 7/7/2023 with right rib fractures & right hemopneumothorax after sustaining a fall in the bathroom.    Interval Events  Chest tube decreased to - 20 cm suction yesterday.   AM chest xray with recurrent 6 mm right apical pneumothorax.  Chest tube with 120 mL of serous output over last 24 hours & no air leak appreciated on exam.  Poor incentive spirometer use at 500 and tolerating room air.  WBC remains elevated at 14.6 and remains afebrile.    - Continue chest tube to - 20 cm suction  - Continue aggressive pulmonary hygiene, continue PEP BID  - Repeat chest xray in AM  - Urinalysis ordered to rule out UTI, discuss with nursing who with obtain sample ASAP  - Duplex ordered to rule out DVT  - Disposition: Accepted by [facility]. Anticipate discharge back to [facility] with home health once medically cleared.    Review of Systems  Review of Systems   Constitutional:  Positive for malaise/fatigue. Negative for chills, diaphoresis and fever.   HENT: Negative.     Eyes: Negative.    Respiratory: Negative.     Cardiovascular: Negative.    Gastrointestinal: Negative.         Last BM 7/12   Genitourinary: Negative.    Musculoskeletal:  Positive for myalgias (right chest wall).   Neurological: Negative.       Vital Signs  Temp:  [36 °C (96.8 °F)-36.7 °C (98.1 °F)] 36.3 °C (97.3 °F)  Pulse:  [] 88  Resp:  [15-18] 15  BP: (114-150)/() 114/77  SpO2:  [93 %-96 %] 93 %    Physical Exam  Physical Exam  Vitals reviewed.   Constitutional:       General: She is not in acute distress.     Appearance: She is not toxic-appearing or diaphoretic.   Cardiovascular:      Rate and Rhythm: Normal rate and regular rhythm.      Heart sounds: Normal heart sounds.   Pulmonary:      Effort: Pulmonary effort is normal. No respiratory distress.      Breath sounds: Normal breath sounds.   Chest:      Chest wall:  Tenderness (right chest wall) present.      Comments: Right chest tube intact without air leak appreciated on exam. Serous drainage noted in tubing.  Abdominal:      General: Bowel sounds are normal. There is no distension.      Palpations: Abdomen is soft.      Tenderness: There is no abdominal tenderness.   Genitourinary:     Comments: Purewick in place with clear yellow urine.  Skin:     General: Skin is warm and dry.   Neurological:      Mental Status: She is alert and oriented to person, place, and time. Mental status is at baseline.   Psychiatric:         Attention and Perception: Attention normal.     Core Measures & Quality Metrics  Medications reviewed, Labs reviewed and Radiology images reviewed  Tolbert catheter: No Tolbert      DVT Prophylaxis: Enoxaparin (Lovenox)  DVT prophylaxis - mechanical: SCDs  Ulcer prophylaxis: Not indicated    Assessed for rehab: Patient returned to prior level of function, rehabilitation not indicated at this time    RAP Score Total: 6    CAGE Results: negative Blood Alcohol>0.08: no       Assessment/Plan  * Traumatic hemothorax without open wound into thorax, initial encounter- (present on admission)  Assessment & Plan  Small right hemopneumothorax.  7/7 Chest tube placed in ED.   7/11 Chest xray with increased pneumo without air leak. Increased suction to - 30 cm.  7/12 Chest xray with resolved right pneumothorax. Chest tube with 130 mL of serosanguinous output over last 24 hours. Decreased suction to - 20 cm.  7/13 Chest xray with recurrent 6 mm right apical pneumothorax, no air leak appreciated. Chest tube with 120 mL of serous output over 24 hours. Continue - 20 cm suction.  Aggressive pulmonary hygiene and serial chest radiography.    Leukocytosis  Assessment & Plan  7/12 WBC increased to 14.8. Patient afebrile and tolerating room air.  - Chest xray with stable atelectasis.  - Urinalysis ordered & pending.  - Bilateral lower extremity duplex ordered & pending    Lives in  group home- (present on admission)  Assessment & Plan  Lives at [facility].  7/12 Accepted by [facility].    Fracture four ribs-closed, right, initial encounter- (present on admission)  Assessment & Plan  Right 6th, 7th, and 8th right posterior rib fractures and possibly 9th posterior rib fracture.  Aggressive pulmonary hygiene and multimodal pain management and serial chest radiography.    No contraindication to deep vein thrombosis (DVT) prophylaxis- (present on admission)  Assessment & Plan  Prophylactic dose enoxaparin 40 mg BID initiated upon admission.    Prediabetes- (present on admission)  Assessment & Plan  Chronic condition treated with metformin.  Resumed maintenance medication on admission.    Parkinson disease (HCC)- (present on admission)  Assessment & Plan  Chronic condition treated with carbidopa-levodopa.  Resumed maintenance medication on admission.    Bipolar 1 disorder, depressed, moderate (HCC)- (present on admission)  Assessment & Plan  Chronic condition treated with Geodon.  Resumed maintenance medication on admission.    Partial epilepsy with impairment of consciousness, intractable (HCC)- (present on admission)  Assessment & Plan  Chronic condition treated with Lamictal and Keppra.  Resumed maintenance medication on admission.    Class 3 severe obesity in adult (HCC)- (present on admission)  Assessment & Plan  BMI 42.16    Trauma- (present on admission)  Assessment & Plan  Fell in bathroom on 7/4 and discharged home from ED.   Presented back to ER with worsening pain.  Non Trauma Activation.  Vale Nieves MD. Trauma Surgery.    Discussed patient condition with RN, , Patient, and trauma surgery, Dr. Loya.

## 2023-07-13 NOTE — THERAPY
Occupational Therapy  Daily Treatment     Patient Name: Erin Santiago  Age:  42 y.o., Sex:  female  Medical Record #: [id number]  Today's Date: 7/12/2023     Precautions  Precautions: Fall Risk, Chest Tube    Assessment  Pt agreeable for OOB activity, chest tube in place. Slow to progress with goals. Pt with limited cognition, impaired balance, decreased functional mobility; requires MaxA with hygiene following incontinence, CGA/Allie with ADL transfers, MaxA with LB dressing, Allie iwht UB. Grooming with SBA, Feeding with Indep. OT will continue to follow.         Plan  Treatment Plan Status: (P) Continue Current Treatment Plan  Type of Treatment: (P) Self Care / Activities of Daily Living, Neuro Re-Education / Balance, Therapeutic Activity  Treatment Frequency: (P) 3 Times per Week  Treatment Duration: (P) Until Therapy Goals Met    DC Equipment Recommendations: (P) Tub Transfer Bench  Discharge Recommendations: (P) Recommend home health for continued occupational therapy services (with increased assistance at group home)    Subjective  Agreeable     Objective     07/12/23 0609   Cognition    Cognition / Consciousness X   Level of Consciousness Alert   Initiation Impaired   Comments pt with developmental delay at baseline, pleasant and cooperative.   Balance   Sitting Balance (Static) Good   Sitting Balance (Dynamic) Fair +   Standing Balance (Static) Fair +   Standing Balance (Dynamic) Fair   Weight Shift Sitting Fair   Weight Shift Standing Fair   Skilled Intervention Verbal Cuing;Facilitation;Postural Facilitation   Bed Mobility    Supine to Sit Contact Guard Assist   Sit to Supine Minimal Assist   Scooting Minimal Assist   Skilled Intervention Verbal Cuing   Activities of Daily Living   Eating Independent   Grooming Seated;Contact Guard Assist   Upper Body Dressing Minimal Assist   Lower Body Dressing Maximal Assist   Toileting Maximal Assist   Skilled Intervention Verbal Cuing;Sequencing   How much help from  another person does the patient currently need...   Putting on and taking off regular lower body clothing? 2   Bathing (including washing, rinsing, and drying)? 2   Toileting, which includes using a toilet, bedpan, or urinal? 2   Putting on and taking off regular upper body clothing? 3   Taking care of personal grooming such as brushing teeth? 4   Eating meals? 4   6 Clicks Daily Activity Score 17   Functional Mobility   Sit to Stand Contact Guard Assist   Bed, Chair, Wheelchair Transfer Minimal Assist   Transfer Method Stand Step   Comments fww   Activity Tolerance   Sitting in Chair 17   Sitting Edge of Bed 12   Standing 2x3   Short Term Goals   Goal Outcome # 1 Progressing as expected   Goal Outcome # 2 Progressing as expected   Goal Outcome # 3 Progressing as expected   Education Group   Role of Occupational Therapist Patient Response Patient;Acceptance;Explanation;Verbal Demonstration   ADL Patient Response Action Demonstration;Explanation;Acceptance;Patient   Occupational Therapy Treatment Plan    O.T. Treatment Plan Continue Current Treatment Plan   Treatment Interventions Self Care / Activities of Daily Living;Neuro Re-Education / Balance;Therapeutic Activity   Treatment Frequency 3 Times per Week   Duration Until Therapy Goals Met   Anticipated Discharge Equipment and Recommendations   DC Equipment Recommendations Tub Transfer Bench   Discharge Recommendations Recommend home health for continued occupational therapy services  (with increased assistance at group home)   Interdisciplinary Plan of Care Collaboration   IDT Collaboration with  Nursing;Certified Nursing Assistant   Patient Position at End of Therapy In Bed;Bed Alarm On;Tray Table within Reach;Phone within Reach;Call Light within Reach   Collaboration Comments CLOF. Incontinent of bm.

## 2023-07-13 NOTE — NON-PROVIDER
This note is intended for the purposes of medical student education and feedback only.   Please refer to the documentation by this patient's assigned medical practitioner for details of care and plans.    Medical Student Note:    Reason for admission: Patient is a 42 y.o. female admitted on 07/07/2023 with right rib fractures and a right hemopneumothorax as a result of a fall in the bathroom.     HD/POD#: Admitted on 7/7/2023 (Hospital Day 6)    SUBJECTIVE  Patient verbally reported that her pain level is better today. She indicated her pain is currently well controlled. Patient had a bowel movement last night.     OBJECTIVE     Vital Signs:  Temp:  [36 °C (96.8 °F)-36.7 °C (98.1 °F)] 36.3 °C (97.3 °F)  Pulse:  [] 88  Resp:  [15-18] 15  BP: (114-150)/() 114/77  SpO2:  [93 %-96 %] 93 %    Physical Exam:  General: Patient appeared to be in no acute distress.   Respiratory: Patient still has a chest tube placed, and it is set to a suction pressure of -20. Poor incentive spirometer use at 500.      Lab Results:  Recent Labs     07/11/23  0231 07/12/23 0223 07/13/23  0058   WBC 13.3* 14.8* 14.6*   RBC 4.69 5.02 5.10   HEMOGLOBIN 13.9 14.7 15.2   HEMATOCRIT 42.9 46.3 46.4   MCV 91.5 92.2 91.0   MCH 29.6 29.3 29.8   MCHC 32.4 31.7* 32.8   RDW 43.4 43.8 43.0   PLATELETCT 423 430 434   MPV 9.2 9.2 9.1     Recent Labs     07/11/23  0231 07/12/23 0223 07/13/23  0058   SODIUM 138 142 138   POTASSIUM 4.5 5.3 4.6   CHLORIDE 101 105 103   CO2 27 24 23   GLUCOSE 93 99 115*   BUN 9 12 12   CREATININE 0.69 0.70 0.60   CALCIUM 8.4* 8.6 8.6               Imaging Results:  Patient had US-Trauma Vein Screen Lower Bilateral Extremity performed this morning to assess for DTVs. Results indicate chronic fibrous strand of residual thrombus in the R distal superficial femoral & bilateral popliteal veins. No acute thrombus observed. Reflux demonstrated in the R popliteal vein.    AM chest X-ray with 6 mm right apical right  pneumothorax. Stable bibasilar atelectasis and low lung volumes. Right chest tube unchanged.    ASSESSMENT/PLAN    Will continue monitoring patient. Urinalysis ordered to rule out UTI.    * Traumatic hemothorax without open wound into thorax, initial encounter- (present on admission)  Assessment & Plan  Small right hemopneumothorax.  7/7 Chest tube placed in ED.   7/11 Chest xray with increased pneumo without air leak. Increased suction to - 30 cm.  7/12 Chest xray with resolved right pneumothorax. Chest tube with 130 mL of serosanguinous output over last 24 hours. Decreased suction to - 20 cm.  7/13 Chest xray with recurrent 6 mm right apical pneumothorax, no air leak appreciated. Chest tube with 120 mL of serous output over 24 hours. Continue - 20 cm suction.  Aggressive pulmonary hygiene and serial chest radiography.     Leukocytosis  Assessment & Plan  7/12 WBC increased to 14.8. Patient afebrile and tolerating room air.  - Chest xray with stable atelectasis.  - Urinalysis ordered & pending.  - Bilateral lower extremity duplex ordered & pending     Lives in [facility]- (present on admission)  Assessment & Plan  [facility] at [facility].  7/12 Accepted by [facility].     Fracture four ribs-closed, right, initial encounter- (present on admission)  Assessment & Plan  Right 6th, 7th, and 8th right posterior rib fractures and possibly 9th posterior rib fracture.  Aggressive pulmonary hygiene and multimodal pain management and serial chest radiography.     No contraindication to deep vein thrombosis (DVT) prophylaxis- (present on admission)  Assessment & Plan  Prophylactic dose enoxaparin 40 mg BID initiated upon admission.     Prediabetes- (present on admission)  Assessment & Plan  Chronic condition treated with metformin.  Resumed maintenance medication on admission.     Parkinson disease (HCC)- (present on admission)  Assessment & Plan  Chronic condition treated with carbidopa-levodopa.  Resumed  maintenance medication on admission.     Bipolar 1 disorder, depressed, moderate ([facility])- (present on admission)  Assessment & Plan  Chronic condition treated with Geodon.  Resumed maintenance medication on admission.     Partial epilepsy with impairment of consciousness, intractable ([facility])- (present on admission)  Assessment & Plan  Chronic condition treated with Lamictal and Keppra.  Resumed maintenance medication on admission.     Class 3 severe obesity in adult ([facility])- (present on admission)  Assessment & Plan  BMI 42.16     Trauma- (present on admission)  Assessment & Plan  Fell in bathroom on 7/4 and discharged home from ED.   Presented back to ER with worsening pain.  Non Trauma Activation.  Vale Nieves MD. Trauma Surgery.    PROPHYLAXIS  DVT: Enoxaparin & SCDs      Overseeing Licensed Provider: BHAVANA Chowdary  [facility]

## 2023-07-13 NOTE — THERAPY
Physical Therapy   Daily Treatment     Patient Name: Erin Santiago  Age:  42 y.o., Sex:  female  Medical Record #: [id number]  Today's Date: 7/13/2023     Precautions  Precautions: (P) Fall Risk;Chest Tube    Assessment    Pt demonstrating improvement in activity tolerance today. Pt able to ambulate with FWW for 150 ft, no SOB, mild periodic LOB due to lateral listing gait pattern. Please see below for specifics. Pt progressing towards goals. Anticipate pt should reach functional level adequate for return to group home with resumption of  care.     Plan    Treatment Plan Status: (P) Continue Current Treatment Plan  Type of Treatment: Bed Mobility, Gait Training, Neuro Re-Education / Balance, Self Care / Home Evaluation, Therapeutic Activities  Treatment Frequency: 3 Times per Week  Treatment Duration: Until Therapy Goals Met    DC Equipment Recommendations: (P) None  Discharge Recommendations: (P) Recommend home health for continued physical therapy services        Precautions   Precautions Fall Risk;Chest Tube   Pain   Intervention Declines   Pain 0 - 10 Group   Therapist Pain Assessment Post Activity Pain Same as Prior to Activity;0   Cognition    Level of Consciousness Alert   Comments pleasant and cooperative, decreased initiation but able to follow commands and answer questions.   Other Treatments   Other Treatments Provided education on FWW safety, transfers and use of call light.   Balance   Sitting Balance (Static) Good   Sitting Balance (Dynamic) Fair +   Standing Balance (Static) Fair +   Standing Balance (Dynamic) Fair   Weight Shift Sitting Fair   Weight Shift Standing Fair   Skilled Intervention Verbal Cuing;Sequencing   Bed Mobility    Supine to Sit Contact Guard Assist   Sit to Supine Contact Guard Assist   Scooting Contact Guard Assist   Skilled Intervention Tactile Cuing;Verbal Cuing   Comments extended time and effort to get to EOB   Gait Analysis   Gait Level Of Assist Contact Guard Assist    Assistive Device Front Wheel Walker   Distance (Feet) 150   # of Times Distance was Traveled 1   Deviation Shuffled Gait;Decreased Heel Strike;Decreased Toe Off  (increased lateral sway.)   Weight Bearing Status no restrictions.   Skilled Intervention Verbal Cuing;Tactile Cuing   Functional Mobility   Sit to Stand Contact Guard Assist   Bed, Chair, Wheelchair Transfer Contact Guard Assist   Toilet Transfers Contact Guard Assist   Transfer Method Stand Step   Comments cues to complete turns. cues for walker use. Required assist with post BM hygiene   How much difficulty does the patient currently have...   Turning over in bed (including adjusting bedclothes, sheets and blankets)? 3   Sitting down on and standing up from a chair with arms (e.g., wheelchair, bedside commode, etc.) 3   Moving from lying on back to sitting on the side of the bed? 3   How much help from another person does the patient currently need...   Moving to and from a bed to a chair (including a wheelchair)? 3   Need to walk in a hospital room? 3   Climbing 3-5 steps with a railing? 1   6 clicks Mobility Score 16   Activity Tolerance   Sitting in Chair up to chair post session.   Standing 8 min   Short Term Goals    Short Term Goal # 1 Pt will be IND with bed mobility by tx   Goal Outcome # 1 Progressing as expected   Short Term Goal # 2 Pt will transfer with FWW a S level for DC home by tx 6   Goal Outcome # 2 Progressing as expected   Short Term Goal # 3 Gait with FWW for 100 ft with S by tx 6   Goal Outcome # 3 Progressing as expected   Education Group   Role of Physical Therapist Patient Response Patient;Acceptance;Explanation;Verbal Demonstration   Gait Training Patient Response Patient;Acceptance;Explanation;Reinforcement Needed   Use of Assistive Device Patient Response Patient;Acceptance;Explanation;Reinforcement Needed   Physical Therapy Treatment Plan   Physical Therapy Treatment Plan Continue Current Treatment Plan   Anticipated  Discharge Equipment and Recommendations   DC Equipment Recommendations None   Discharge Recommendations Recommend home health for continued physical therapy services   Interdisciplinary Plan of Care Collaboration   IDT Collaboration with  Nursing   Patient Position at End of Therapy Seated;Chair Alarm On;Tray Table within Reach;Call Light within Reach   Collaboration Comments RN updated, pt had large formed BM in toilet. assisted with hygiene, ambulated on RA SpO2 94 %   Session Information   Date / Session Number  7/13-2 ( 2/3, 7/16)

## 2023-07-14 ENCOUNTER — APPOINTMENT (OUTPATIENT)
Dept: RADIOLOGY | Facility: MEDICAL CENTER | Age: 43
DRG: 200 | End: 2023-07-14
Attending: SURGERY
Payer: MEDICARE

## 2023-07-14 LAB
ANION GAP SERPL CALC-SCNC: 12 MMOL/L (ref 7–16)
BASOPHILS # BLD AUTO: 0.8 % (ref 0–1.8)
BASOPHILS # BLD: 0.12 K/UL (ref 0–0.12)
BUN SERPL-MCNC: 15 MG/DL (ref 8–22)
CALCIUM SERPL-MCNC: 8.4 MG/DL (ref 8.5–10.5)
CHLORIDE SERPL-SCNC: 102 MMOL/L (ref 96–112)
CO2 SERPL-SCNC: 22 MMOL/L (ref 20–33)
CREAT SERPL-MCNC: 0.6 MG/DL (ref 0.5–1.4)
EOSINOPHIL # BLD AUTO: 0.52 K/UL (ref 0–0.51)
EOSINOPHIL NFR BLD: 3.3 % (ref 0–6.9)
ERYTHROCYTE [DISTWIDTH] IN BLOOD BY AUTOMATED COUNT: 42.7 FL (ref 35.9–50)
GFR SERPLBLD CREATININE-BSD FMLA CKD-EPI: 113 ML/MIN/1.73 M 2
GLUCOSE SERPL-MCNC: 95 MG/DL (ref 65–99)
HCT VFR BLD AUTO: 44.8 % (ref 37–47)
HGB BLD-MCNC: 14.9 G/DL (ref 12–16)
IMM GRANULOCYTES # BLD AUTO: 0.17 K/UL (ref 0–0.11)
IMM GRANULOCYTES NFR BLD AUTO: 1.1 % (ref 0–0.9)
LYMPHOCYTES # BLD AUTO: 2.74 K/UL (ref 1–4.8)
LYMPHOCYTES NFR BLD: 17.6 % (ref 22–41)
MCH RBC QN AUTO: 30 PG (ref 27–33)
MCHC RBC AUTO-ENTMCNC: 33.3 G/DL (ref 32.2–35.5)
MCV RBC AUTO: 90.1 FL (ref 81.4–97.8)
MONOCYTES # BLD AUTO: 1.12 K/UL (ref 0–0.85)
MONOCYTES NFR BLD AUTO: 7.2 % (ref 0–13.4)
NEUTROPHILS # BLD AUTO: 10.93 K/UL (ref 1.82–7.42)
NEUTROPHILS NFR BLD: 70 % (ref 44–72)
NRBC # BLD AUTO: 0 K/UL
NRBC BLD-RTO: 0 /100 WBC (ref 0–0.2)
PLATELET # BLD AUTO: 422 K/UL (ref 164–446)
PMV BLD AUTO: 9.3 FL (ref 9–12.9)
POTASSIUM SERPL-SCNC: 4.1 MMOL/L (ref 3.6–5.5)
RBC # BLD AUTO: 4.97 M/UL (ref 4.2–5.4)
SODIUM SERPL-SCNC: 136 MMOL/L (ref 135–145)
WBC # BLD AUTO: 15.6 K/UL (ref 4.8–10.8)

## 2023-07-14 PROCEDURE — 80048 BASIC METABOLIC PNL TOTAL CA: CPT

## 2023-07-14 PROCEDURE — 85025 COMPLETE CBC W/AUTO DIFF WBC: CPT

## 2023-07-14 PROCEDURE — A9270 NON-COVERED ITEM OR SERVICE: HCPCS | Performed by: SURGERY

## 2023-07-14 PROCEDURE — 71045 X-RAY EXAM CHEST 1 VIEW: CPT

## 2023-07-14 PROCEDURE — 36415 COLL VENOUS BLD VENIPUNCTURE: CPT

## 2023-07-14 PROCEDURE — 700111 HCHG RX REV CODE 636 W/ 250 OVERRIDE (IP): Mod: JZ | Performed by: NURSE PRACTITIONER

## 2023-07-14 PROCEDURE — 700102 HCHG RX REV CODE 250 W/ 637 OVERRIDE(OP): Performed by: SURGERY

## 2023-07-14 PROCEDURE — 770001 HCHG ROOM/CARE - MED/SURG/GYN PRIV*

## 2023-07-14 PROCEDURE — A9270 NON-COVERED ITEM OR SERVICE: HCPCS | Performed by: NURSE PRACTITIONER

## 2023-07-14 PROCEDURE — 700102 HCHG RX REV CODE 250 W/ 637 OVERRIDE(OP): Performed by: NURSE PRACTITIONER

## 2023-07-14 PROCEDURE — 99232 SBSQ HOSP IP/OBS MODERATE 35: CPT | Performed by: NURSE PRACTITIONER

## 2023-07-14 RX ORDER — SULFAMETHOXAZOLE AND TRIMETHOPRIM 800; 160 MG/1; MG/1
1 TABLET ORAL EVERY 12 HOURS
Status: DISCONTINUED | OUTPATIENT
Start: 2023-07-14 | End: 2023-07-15

## 2023-07-14 RX ADMIN — CARBIDOPA AND LEVODOPA 1 TABLET: 25; 250 TABLET ORAL at 05:44

## 2023-07-14 RX ADMIN — ENOXAPARIN SODIUM 40 MG: 100 INJECTION SUBCUTANEOUS at 09:28

## 2023-07-14 RX ADMIN — LEVETIRACETAM 500 MG: 500 TABLET, FILM COATED ORAL at 05:43

## 2023-07-14 RX ADMIN — ZIPRASIDONE HYDROCHLORIDE 60 MG: 60 CAPSULE ORAL at 17:23

## 2023-07-14 RX ADMIN — LAMOTRIGINE 25 MG: 100 TABLET ORAL at 05:44

## 2023-07-14 RX ADMIN — SULFAMETHOXAZOLE AND TRIMETHOPRIM 1 TABLET: 800; 160 TABLET ORAL at 21:20

## 2023-07-14 RX ADMIN — CARBIDOPA AND LEVODOPA 1 TABLET: 25; 250 TABLET ORAL at 14:31

## 2023-07-14 RX ADMIN — ENOXAPARIN SODIUM 40 MG: 100 INJECTION SUBCUTANEOUS at 21:21

## 2023-07-14 RX ADMIN — LEVETIRACETAM 500 MG: 500 TABLET, FILM COATED ORAL at 17:23

## 2023-07-14 RX ADMIN — DOCUSATE SODIUM 100 MG: 100 CAPSULE, LIQUID FILLED ORAL at 05:43

## 2023-07-14 RX ADMIN — CARBIDOPA AND LEVODOPA 1 TABLET: 25; 250 TABLET ORAL at 21:20

## 2023-07-14 RX ADMIN — SULFAMETHOXAZOLE AND TRIMETHOPRIM 1 TABLET: 800; 160 TABLET ORAL at 09:28

## 2023-07-14 RX ADMIN — METFORMIN HYDROCHLORIDE 500 MG: 500 TABLET ORAL at 17:23

## 2023-07-14 RX ADMIN — ZIPRASIDONE HYDROCHLORIDE 60 MG: 60 CAPSULE ORAL at 05:44

## 2023-07-14 RX ADMIN — SENNOSIDES AND DOCUSATE SODIUM 1 TABLET: 50; 8.6 TABLET ORAL at 21:20

## 2023-07-14 RX ADMIN — METFORMIN HYDROCHLORIDE 500 MG: 500 TABLET ORAL at 05:43

## 2023-07-14 ASSESSMENT — PAIN DESCRIPTION - PAIN TYPE: TYPE: ACUTE PAIN

## 2023-07-14 ASSESSMENT — ENCOUNTER SYMPTOMS
RESPIRATORY NEGATIVE: 1
ROS GI COMMENTS: BM 7/13
PSYCHIATRIC NEGATIVE: 1
MYALGIAS: 1
NEUROLOGICAL NEGATIVE: 1

## 2023-07-14 NOTE — CARE PLAN
The patient is Stable - Low risk of patient condition declining or worsening    Shift Goals  Clinical Goals: monitor chest tube output  Patient Goals: rest  Family Goals: NA    Progress made toward(s) clinical / shift goals:    Problem: Pain - Standard  Goal: Alleviation of pain or a reduction in pain to the patient’s comfort goal  Outcome: Progressing     Problem: Knowledge Deficit - Standard  Goal: Patient and family/care givers will demonstrate understanding of plan of care, disease process/condition, diagnostic tests and medications  Outcome: Progressing     Problem: Skin Integrity  Goal: Skin integrity is maintained or improved  Outcome: Progressing     Problem: Fall Risk  Goal: Patient will remain free from falls  Outcome: Progressing       Patient is not progressing towards the following goals:

## 2023-07-14 NOTE — CARE PLAN
The patient is Stable - Low risk of patient condition declining or worsening    Shift Goals  Clinical Goals: monitor chest tube, ambulation  Patient Goals: rest  Family Goals: not present    Progress made toward(s) clinical / shift goals:  update the pt on POC for discharge and chest tube maintenance. Turning pt q2hr, frequent checks for incontinence, interdry to pannus in use. No skin breakdown noted. Pt ambulated in the hallway with x1 assist with walker. Tolerated well.       Problem: Knowledge Deficit - Standard  Goal: Patient and family/care givers will demonstrate understanding of plan of care, disease process/condition, diagnostic tests and medications  Outcome: Progressing     Problem: Skin Integrity  Goal: Skin integrity is maintained or improved  Outcome: Progressing

## 2023-07-14 NOTE — PROGRESS NOTES
Trauma / Surgical Daily Progress Note    Date of Service  7/14/2023    Chief Complaint  42 y.o. female admitted 7/7/2023 as a non trauma activation - Fall in bathroom 3 d PTA - Right rib fx 6-9 and pneumothorax.    Interval Events  CXR with stable pneumo  Water seal  Clamp at 0600 with f/u xray at 1000      Review of Systems  Review of Systems   Constitutional:  Positive for malaise/fatigue.   HENT: Negative.     Respiratory: Negative.     Gastrointestinal:         BM 7/13   Genitourinary:         Voiding   Musculoskeletal:  Positive for myalgias.   Neurological: Negative.    Psychiatric/Behavioral: Negative.     All other systems reviewed and are negative.       Vital Signs  Temp:  [36.6 °C (97.9 °F)-37.2 °C (99 °F)] 36.6 °C (97.9 °F)  Pulse:  [61-86] 61  Resp:  [16-19] 19  BP: (104-150)/(63-87) 104/70  SpO2:  [91 %-95 %] 93 %    Physical Exam  Physical Exam  Vitals and nursing note reviewed.   Constitutional:       General: She is not in acute distress.     Appearance: Normal appearance.   HENT:      Right Ear: External ear normal.      Left Ear: External ear normal.      Nose: Nose normal.      Mouth/Throat:      Mouth: Mucous membranes are moist.      Pharynx: Oropharynx is clear.   Eyes:      General:         Right eye: No discharge.         Left eye: No discharge.      Pupils: Pupils are equal, round, and reactive to light.   Pulmonary:      Effort: Pulmonary effort is normal. No respiratory distress.      Comments: Diminished   CT without air leak  Chest:      Chest wall: Tenderness present.   Abdominal:      General: There is no distension.      Palpations: Abdomen is soft.      Tenderness: There is no abdominal tenderness.   Musculoskeletal:         General: No tenderness.      Cervical back: Normal range of motion. No rigidity. No muscular tenderness.   Skin:     General: Skin is warm.      Capillary Refill: Capillary refill takes less than 2 seconds.   Neurological:      General: No focal deficit  present.      Mental Status: She is alert. Mental status is at baseline.   Psychiatric:         Attention and Perception: Attention normal.         Mood and Affect: Affect is labile.         Behavior: Behavior normal.         Thought Content: Thought content normal.       Core Measures & Quality Metrics  Medications reviewed, Labs reviewed and Radiology images reviewed  Tolbert catheter: No Tolbert      DVT Prophylaxis: Enoxaparin (Lovenox)  DVT prophylaxis - mechanical: SCDs  Ulcer prophylaxis: Not indicated    Assessed for rehab: Patient was assess for and/or received rehabilitation services during this hospitalization    RAP Score Total: 6    CAGE Results: negative Blood Alcohol>0.08: no       Assessment/Plan  * Traumatic hemothorax without open wound into thorax, initial encounter- (present on admission)  Assessment & Plan  Small right hemopneumothorax.  7/7 Chest tube placed in ED.   7/11 Chest xray with increased pneumo without air leak. Increased suction to - 30 cm.  7/12 Chest xray with resolved right pneumothorax. Chest tube with 130 mL of serosanguinous output over last 24 hours. Decreased suction to - 20 cm.  7/13 Chest xray with recurrent 6 mm right apical pneumothorax, no air leak appreciated. Chest tube with 120 mL of serous output over 24 hours. Continue - 20 cm suction.  Aggressive pulmonary hygiene and serial chest radiography.    Leukocytosis  Assessment & Plan  7/12 WBC increased to 14.8. Patient afebrile and tolerating room air.  - Chest xray with stable atelectasis.  - Urinalysis ordered & pending.  - Bilateral lower extremity duplex ordered & pending    Lives in custodial- (present on admission)  Assessment & Plan  L[facility] at [facility].  7/12 Accepted by [facility].    Fracture four ribs-closed, right, initial encounter- (present on admission)  Assessment & Plan  Right 6th, 7th, and 8th right posterior rib fractures and possibly 9th posterior rib fracture.  Aggressive  pulmonary hygiene and multimodal pain management and serial chest radiography.    No contraindication to deep vein thrombosis (DVT) prophylaxis- (present on admission)  Assessment & Plan  Prophylactic dose enoxaparin 40 mg BID initiated upon admission.    Prediabetes- (present on admission)  Assessment & Plan  Chronic condition treated with metformin.  Resumed maintenance medication on admission.    Parkinson disease (HCC)- (present on admission)  Assessment & Plan  Chronic condition treated with carbidopa-levodopa.  Resumed maintenance medication on admission.    Bipolar 1 disorder, depressed, moderate (HCC)- (present on admission)  Assessment & Plan  Chronic condition treated with Geodon.  Resumed maintenance medication on admission.    Partial epilepsy with impairment of consciousness, intractable (HCC)- (present on admission)  Assessment & Plan  Chronic condition treated with Lamictal and Keppra.  Resumed maintenance medication on admission.    Class 3 severe obesity in adult (HCC)- (present on admission)  Assessment & Plan  BMI 42.16    Trauma- (present on admission)  Assessment & Plan  Fell in bathroom on 7/4 and discharged home from ED.   Presented back to ER with worsening pain.  Non Trauma Activation.  Vale Nieves MD. Trauma Surgery.      Discussed patient condition with RN, Patient, and Dr. Loya .

## 2023-07-15 ENCOUNTER — APPOINTMENT (OUTPATIENT)
Dept: RADIOLOGY | Facility: MEDICAL CENTER | Age: 43
DRG: 200 | End: 2023-07-15
Attending: NURSE PRACTITIONER
Payer: MEDICARE

## 2023-07-15 LAB
ANION GAP SERPL CALC-SCNC: 11 MMOL/L (ref 7–16)
BASOPHILS # BLD AUTO: 0.5 % (ref 0–1.8)
BASOPHILS # BLD: 0.08 K/UL (ref 0–0.12)
BUN SERPL-MCNC: 13 MG/DL (ref 8–22)
CALCIUM SERPL-MCNC: 8.4 MG/DL (ref 8.5–10.5)
CHLORIDE SERPL-SCNC: 104 MMOL/L (ref 96–112)
CO2 SERPL-SCNC: 20 MMOL/L (ref 20–33)
CREAT SERPL-MCNC: 0.66 MG/DL (ref 0.5–1.4)
EOSINOPHIL # BLD AUTO: 0.36 K/UL (ref 0–0.51)
EOSINOPHIL NFR BLD: 2.2 % (ref 0–6.9)
ERYTHROCYTE [DISTWIDTH] IN BLOOD BY AUTOMATED COUNT: 42.5 FL (ref 35.9–50)
GFR SERPLBLD CREATININE-BSD FMLA CKD-EPI: 110 ML/MIN/1.73 M 2
GLUCOSE SERPL-MCNC: 69 MG/DL (ref 65–99)
HCT VFR BLD AUTO: 45.1 % (ref 37–47)
HGB BLD-MCNC: 14.9 G/DL (ref 12–16)
IMM GRANULOCYTES # BLD AUTO: 0.13 K/UL (ref 0–0.11)
IMM GRANULOCYTES NFR BLD AUTO: 0.8 % (ref 0–0.9)
LYMPHOCYTES # BLD AUTO: 2.77 K/UL (ref 1–4.8)
LYMPHOCYTES NFR BLD: 17.2 % (ref 22–41)
MCH RBC QN AUTO: 29.9 PG (ref 27–33)
MCHC RBC AUTO-ENTMCNC: 33 G/DL (ref 32.2–35.5)
MCV RBC AUTO: 90.4 FL (ref 81.4–97.8)
MONOCYTES # BLD AUTO: 1.4 K/UL (ref 0–0.85)
MONOCYTES NFR BLD AUTO: 8.7 % (ref 0–13.4)
NEUTROPHILS # BLD AUTO: 11.37 K/UL (ref 1.82–7.42)
NEUTROPHILS NFR BLD: 70.6 % (ref 44–72)
NRBC # BLD AUTO: 0 K/UL
NRBC BLD-RTO: 0 /100 WBC (ref 0–0.2)
PLATELET # BLD AUTO: 438 K/UL (ref 164–446)
PMV BLD AUTO: 9.3 FL (ref 9–12.9)
POTASSIUM SERPL-SCNC: 4.5 MMOL/L (ref 3.6–5.5)
RBC # BLD AUTO: 4.99 M/UL (ref 4.2–5.4)
SODIUM SERPL-SCNC: 135 MMOL/L (ref 135–145)
WBC # BLD AUTO: 16.1 K/UL (ref 4.8–10.8)

## 2023-07-15 PROCEDURE — 80048 BASIC METABOLIC PNL TOTAL CA: CPT

## 2023-07-15 PROCEDURE — 770001 HCHG ROOM/CARE - MED/SURG/GYN PRIV*

## 2023-07-15 PROCEDURE — 97602 WOUND(S) CARE NON-SELECTIVE: CPT

## 2023-07-15 PROCEDURE — 700102 HCHG RX REV CODE 250 W/ 637 OVERRIDE(OP): Performed by: SURGERY

## 2023-07-15 PROCEDURE — 85025 COMPLETE CBC W/AUTO DIFF WBC: CPT

## 2023-07-15 PROCEDURE — 71045 X-RAY EXAM CHEST 1 VIEW: CPT

## 2023-07-15 PROCEDURE — 700102 HCHG RX REV CODE 250 W/ 637 OVERRIDE(OP): Performed by: NURSE PRACTITIONER

## 2023-07-15 PROCEDURE — 36415 COLL VENOUS BLD VENIPUNCTURE: CPT

## 2023-07-15 PROCEDURE — 99232 SBSQ HOSP IP/OBS MODERATE 35: CPT | Performed by: NURSE PRACTITIONER

## 2023-07-15 PROCEDURE — 700111 HCHG RX REV CODE 636 W/ 250 OVERRIDE (IP): Mod: JZ | Performed by: NURSE PRACTITIONER

## 2023-07-15 PROCEDURE — A9270 NON-COVERED ITEM OR SERVICE: HCPCS | Performed by: SURGERY

## 2023-07-15 PROCEDURE — A9270 NON-COVERED ITEM OR SERVICE: HCPCS | Performed by: NURSE PRACTITIONER

## 2023-07-15 RX ORDER — NITROFURANTOIN 25; 75 MG/1; MG/1
100 CAPSULE ORAL 2 TIMES DAILY WITH MEALS
Status: DISCONTINUED | OUTPATIENT
Start: 2023-07-15 | End: 2023-07-17 | Stop reason: HOSPADM

## 2023-07-15 RX ADMIN — METFORMIN HYDROCHLORIDE 500 MG: 500 TABLET ORAL at 17:38

## 2023-07-15 RX ADMIN — MAGNESIUM HYDROXIDE 30 ML: 1200 LIQUID ORAL at 05:57

## 2023-07-15 RX ADMIN — ZIPRASIDONE HYDROCHLORIDE 60 MG: 60 CAPSULE ORAL at 17:38

## 2023-07-15 RX ADMIN — SULFAMETHOXAZOLE AND TRIMETHOPRIM 1 TABLET: 800; 160 TABLET ORAL at 05:58

## 2023-07-15 RX ADMIN — CARBIDOPA AND LEVODOPA 1 TABLET: 25; 250 TABLET ORAL at 05:58

## 2023-07-15 RX ADMIN — ENOXAPARIN SODIUM 40 MG: 100 INJECTION SUBCUTANEOUS at 21:33

## 2023-07-15 RX ADMIN — NITROFURANTOIN MONOHYDRATE/MACROCRYSTALLINE 100 MG: 25; 75 CAPSULE ORAL at 17:37

## 2023-07-15 RX ADMIN — LAMOTRIGINE 25 MG: 100 TABLET ORAL at 05:58

## 2023-07-15 RX ADMIN — CARBIDOPA AND LEVODOPA 1 TABLET: 25; 250 TABLET ORAL at 21:33

## 2023-07-15 RX ADMIN — LEVETIRACETAM 500 MG: 500 TABLET, FILM COATED ORAL at 05:57

## 2023-07-15 RX ADMIN — DOCUSATE SODIUM 100 MG: 100 CAPSULE, LIQUID FILLED ORAL at 05:57

## 2023-07-15 RX ADMIN — ENOXAPARIN SODIUM 40 MG: 100 INJECTION SUBCUTANEOUS at 08:35

## 2023-07-15 RX ADMIN — METFORMIN HYDROCHLORIDE 500 MG: 500 TABLET ORAL at 08:35

## 2023-07-15 RX ADMIN — POLYETHYLENE GLYCOL 3350 1 PACKET: 17 POWDER, FOR SOLUTION ORAL at 05:57

## 2023-07-15 RX ADMIN — ZIPRASIDONE HYDROCHLORIDE 60 MG: 60 CAPSULE ORAL at 05:58

## 2023-07-15 RX ADMIN — CARBIDOPA AND LEVODOPA 1 TABLET: 25; 250 TABLET ORAL at 13:40

## 2023-07-15 RX ADMIN — SENNOSIDES AND DOCUSATE SODIUM 1 TABLET: 50; 8.6 TABLET ORAL at 21:33

## 2023-07-15 RX ADMIN — LEVETIRACETAM 500 MG: 500 TABLET, FILM COATED ORAL at 17:38

## 2023-07-15 ASSESSMENT — ENCOUNTER SYMPTOMS
NEUROLOGICAL NEGATIVE: 1
MYALGIAS: 1
ROS GI COMMENTS: BM 7/13
PSYCHIATRIC NEGATIVE: 1
RESPIRATORY NEGATIVE: 1

## 2023-07-15 ASSESSMENT — PAIN DESCRIPTION - PAIN TYPE: TYPE: ACUTE PAIN

## 2023-07-15 NOTE — CARE PLAN
The patient is Stable - Low risk of patient condition declining or worsening    Shift Goals  Clinical Goals: monitor for SOB, o2 sat  Patient Goals: rest  Family Goals: NA    Progress made toward(s) clinical / shift goals:    Problem: Pain - Standard  Goal: Alleviation of pain or a reduction in pain to the patient’s comfort goal  Outcome: Progressing     Problem: Knowledge Deficit - Standard  Goal: Patient and family/care givers will demonstrate understanding of plan of care, disease process/condition, diagnostic tests and medications  Outcome: Progressing     Problem: Skin Integrity  Goal: Skin integrity is maintained or improved  Outcome: Progressing     Problem: Fall Risk  Goal: Patient will remain free from falls  Outcome: Progressing       Patient is not progressing towards the following goals:

## 2023-07-15 NOTE — PROGRESS NOTES
Chest tube removed by Trauma NP. Pt tolerated procedure, 92% on room air. Pt transferred up to chair for lunch, educated to call if experiencing SOB.

## 2023-07-15 NOTE — PROGRESS NOTES
Trauma / Surgical Daily Progress Note    Date of Service  7/15/2023    Chief Complaint  42 y.o. female admitted 7/7/2023 as a non trauma activation - Fall in bathroom 3 d PTA - Right rib fx 6-9 and pneumothorax.    Interval Events  CXR without pneumo  CT discontinued - tolerated well   CXR in am  WBC 16.1 - changed to Macrobid     Anticipate medical clearance for home tomorrow     Review of Systems  Review of Systems   Constitutional:  Positive for malaise/fatigue.   HENT: Negative.     Respiratory: Negative.     Gastrointestinal:         BM 7/13   Genitourinary:         Voiding   Musculoskeletal:  Positive for myalgias.   Neurological: Negative.    Psychiatric/Behavioral: Negative.     All other systems reviewed and are negative.       Vital Signs  Temp:  [36.4 °C (97.5 °F)-37.1 °C (98.8 °F)] 37.1 °C (98.8 °F)  Pulse:  [81-97] 94  Resp:  [15-19] 18  BP: ()/(60-80) 129/80  SpO2:  [90 %-93 %] 90 %    Physical Exam  Physical Exam  Vitals and nursing note reviewed.   Constitutional:       General: She is not in acute distress.     Appearance: Normal appearance.   HENT:      Right Ear: External ear normal.      Left Ear: External ear normal.      Nose: Nose normal.      Mouth/Throat:      Mouth: Mucous membranes are moist.      Pharynx: Oropharynx is clear.   Eyes:      General:         Right eye: No discharge.         Left eye: No discharge.      Pupils: Pupils are equal, round, and reactive to light.   Pulmonary:      Effort: Pulmonary effort is normal. No respiratory distress.      Comments: Diminished   CT removed - dressed  Chest:      Chest wall: Tenderness present.   Abdominal:      General: There is no distension.      Palpations: Abdomen is soft.      Tenderness: There is no abdominal tenderness.   Musculoskeletal:         General: No tenderness.      Cervical back: Normal range of motion. No rigidity. No muscular tenderness.   Skin:     General: Skin is warm.      Capillary Refill: Capillary refill  takes less than 2 seconds.   Neurological:      General: No focal deficit present.      Mental Status: She is alert. Mental status is at baseline.   Psychiatric:         Attention and Perception: Attention normal.         Mood and Affect: Affect is labile.         Behavior: Behavior normal.         Thought Content: Thought content normal.       Core Measures & Quality Metrics  Medications reviewed, Labs reviewed and Radiology images reviewed  Tolbert catheter: No Tolbert      DVT Prophylaxis: Enoxaparin (Lovenox)  DVT prophylaxis - mechanical: SCDs  Ulcer prophylaxis: Not indicated    Assessed for rehab: Patient was assess for and/or received rehabilitation services during this hospitalization    RAP Score Total: 6    CAGE Results: negative Blood Alcohol>0.08: no       Assessment/Plan  * Traumatic hemothorax without open wound into thorax, initial encounter- (present on admission)  Assessment & Plan  Small right hemopneumothorax.  7/7 Chest tube placed in ED.   7/11 Chest xray with increased pneumo without air leak. Increased suction to - 30 cm.  7/12 Chest xray with resolved right pneumothorax. Chest tube with 130 mL of serosanguinous output over last 24 hours. Decreased suction to - 20 cm.  7/13 Chest xray with recurrent 6 mm right apical pneumothorax, no air leak appreciated. Chest tube with 120 mL of serous output over 24 hours. Continue - 20 cm suction.  Aggressive pulmonary hygiene and serial chest radiography.    Leukocytosis  Assessment & Plan  7/12 WBC increased to 14.8. Patient afebrile and tolerating room air.  - Chest xray with stable atelectasis.  - Urinalysis ordered & pending.  - Bilateral lower extremity duplex ordered & pending    Lives in detention- (present on admission)  Assessment & Plan  Lives at [facility].  7/12 Accepted by [facility].    Fracture four ribs-closed, right, initial encounter- (present on admission)  Assessment & Plan  Right 6th, 7th, and 8th right posterior  rib fractures and possibly 9th posterior rib fracture.  Aggressive pulmonary hygiene and multimodal pain management and serial chest radiography.    No contraindication to deep vein thrombosis (DVT) prophylaxis- (present on admission)  Assessment & Plan  Prophylactic dose enoxaparin 40 mg BID initiated upon admission.    Prediabetes- (present on admission)  Assessment & Plan  Chronic condition treated with metformin.  Resumed maintenance medication on admission.    Parkinson disease (HCC)- (present on admission)  Assessment & Plan  Chronic condition treated with carbidopa-levodopa.  Resumed maintenance medication on admission.    Bipolar 1 disorder, depressed, moderate (HCC)- (present on admission)  Assessment & Plan  Chronic condition treated with Geodon.  Resumed maintenance medication on admission.    Partial epilepsy with impairment of consciousness, intractable ([facility])- (present on admission)  Assessment & Plan  Chronic condition treated with Lamictal and Keppra.  Resumed maintenance medication on admission.    Class 3 severe obesity in adult (HCC)- (present on admission)  Assessment & Plan  BMI 42.16    Trauma- (present on admission)  Assessment & Plan  Fell in bathroom on 7/4 and discharged home from ED.   Presented back to ER with worsening pain.  Non Trauma Activation.  Vale Nieves MD. Trauma Surgery.    Discussed patient condition with RN, Patient, and Dr. Da Silva .

## 2023-07-16 ENCOUNTER — PHARMACY VISIT (OUTPATIENT)
Dept: PHARMACY | Facility: MEDICAL CENTER | Age: 43
End: 2023-07-16
Payer: MEDICARE

## 2023-07-16 ENCOUNTER — APPOINTMENT (OUTPATIENT)
Dept: RADIOLOGY | Facility: MEDICAL CENTER | Age: 43
DRG: 200 | End: 2023-07-16
Attending: NURSE PRACTITIONER
Payer: MEDICARE

## 2023-07-16 LAB
ANION GAP SERPL CALC-SCNC: 11 MMOL/L (ref 7–16)
BASOPHILS # BLD AUTO: 0.8 % (ref 0–1.8)
BASOPHILS # BLD: 0.11 K/UL (ref 0–0.12)
BUN SERPL-MCNC: 13 MG/DL (ref 8–22)
CALCIUM SERPL-MCNC: 8.6 MG/DL (ref 8.5–10.5)
CHLORIDE SERPL-SCNC: 102 MMOL/L (ref 96–112)
CO2 SERPL-SCNC: 23 MMOL/L (ref 20–33)
CREAT SERPL-MCNC: 0.76 MG/DL (ref 0.5–1.4)
EOSINOPHIL # BLD AUTO: 0.41 K/UL (ref 0–0.51)
EOSINOPHIL NFR BLD: 2.9 % (ref 0–6.9)
ERYTHROCYTE [DISTWIDTH] IN BLOOD BY AUTOMATED COUNT: 44.3 FL (ref 35.9–50)
GFR SERPLBLD CREATININE-BSD FMLA CKD-EPI: 98 ML/MIN/1.73 M 2
GLUCOSE SERPL-MCNC: 97 MG/DL (ref 65–99)
HCT VFR BLD AUTO: 46.4 % (ref 37–47)
HGB BLD-MCNC: 15 G/DL (ref 12–16)
IMM GRANULOCYTES # BLD AUTO: 0.14 K/UL (ref 0–0.11)
IMM GRANULOCYTES NFR BLD AUTO: 1 % (ref 0–0.9)
LYMPHOCYTES # BLD AUTO: 2.7 K/UL (ref 1–4.8)
LYMPHOCYTES NFR BLD: 19.4 % (ref 22–41)
MCH RBC QN AUTO: 29.8 PG (ref 27–33)
MCHC RBC AUTO-ENTMCNC: 32.3 G/DL (ref 32.2–35.5)
MCV RBC AUTO: 92.1 FL (ref 81.4–97.8)
MONOCYTES # BLD AUTO: 1.04 K/UL (ref 0–0.85)
MONOCYTES NFR BLD AUTO: 7.5 % (ref 0–13.4)
NEUTROPHILS # BLD AUTO: 9.51 K/UL (ref 1.82–7.42)
NEUTROPHILS NFR BLD: 68.4 % (ref 44–72)
NRBC # BLD AUTO: 0 K/UL
NRBC BLD-RTO: 0 /100 WBC (ref 0–0.2)
PLATELET # BLD AUTO: 444 K/UL (ref 164–446)
PMV BLD AUTO: 9.2 FL (ref 9–12.9)
POTASSIUM SERPL-SCNC: 4.6 MMOL/L (ref 3.6–5.5)
RBC # BLD AUTO: 5.04 M/UL (ref 4.2–5.4)
SODIUM SERPL-SCNC: 136 MMOL/L (ref 135–145)
WBC # BLD AUTO: 13.9 K/UL (ref 4.8–10.8)

## 2023-07-16 PROCEDURE — 99239 HOSP IP/OBS DSCHRG MGMT >30: CPT | Performed by: NURSE PRACTITIONER

## 2023-07-16 PROCEDURE — A9270 NON-COVERED ITEM OR SERVICE: HCPCS | Performed by: NURSE PRACTITIONER

## 2023-07-16 PROCEDURE — A9270 NON-COVERED ITEM OR SERVICE: HCPCS | Performed by: SURGERY

## 2023-07-16 PROCEDURE — 700102 HCHG RX REV CODE 250 W/ 637 OVERRIDE(OP): Performed by: SURGERY

## 2023-07-16 PROCEDURE — 71045 X-RAY EXAM CHEST 1 VIEW: CPT

## 2023-07-16 PROCEDURE — 85025 COMPLETE CBC W/AUTO DIFF WBC: CPT

## 2023-07-16 PROCEDURE — RXMED WILLOW AMBULATORY MEDICATION CHARGE: Performed by: NURSE PRACTITIONER

## 2023-07-16 PROCEDURE — 80048 BASIC METABOLIC PNL TOTAL CA: CPT

## 2023-07-16 PROCEDURE — 700102 HCHG RX REV CODE 250 W/ 637 OVERRIDE(OP): Performed by: NURSE PRACTITIONER

## 2023-07-16 PROCEDURE — 700111 HCHG RX REV CODE 636 W/ 250 OVERRIDE (IP): Mod: JZ | Performed by: NURSE PRACTITIONER

## 2023-07-16 PROCEDURE — 36415 COLL VENOUS BLD VENIPUNCTURE: CPT

## 2023-07-16 PROCEDURE — 770001 HCHG ROOM/CARE - MED/SURG/GYN PRIV*

## 2023-07-16 RX ORDER — NITROFURANTOIN 25; 75 MG/1; MG/1
100 CAPSULE ORAL 2 TIMES DAILY WITH MEALS
Qty: 10 CAPSULE | Refills: 0 | Status: ACTIVE | OUTPATIENT
Start: 2023-07-16 | End: 2023-07-17 | Stop reason: SDUPTHER

## 2023-07-16 RX ADMIN — LAMOTRIGINE 25 MG: 100 TABLET ORAL at 05:34

## 2023-07-16 RX ADMIN — LEVETIRACETAM 500 MG: 500 TABLET, FILM COATED ORAL at 05:34

## 2023-07-16 RX ADMIN — ENOXAPARIN SODIUM 40 MG: 100 INJECTION SUBCUTANEOUS at 08:44

## 2023-07-16 RX ADMIN — METFORMIN HYDROCHLORIDE 500 MG: 500 TABLET ORAL at 16:42

## 2023-07-16 RX ADMIN — ZIPRASIDONE HYDROCHLORIDE 60 MG: 60 CAPSULE ORAL at 16:42

## 2023-07-16 RX ADMIN — METFORMIN HYDROCHLORIDE 500 MG: 500 TABLET ORAL at 08:44

## 2023-07-16 RX ADMIN — ZIPRASIDONE HYDROCHLORIDE 60 MG: 60 CAPSULE ORAL at 05:34

## 2023-07-16 RX ADMIN — ENOXAPARIN SODIUM 40 MG: 100 INJECTION SUBCUTANEOUS at 20:46

## 2023-07-16 RX ADMIN — CARBIDOPA AND LEVODOPA 1 TABLET: 25; 250 TABLET ORAL at 05:34

## 2023-07-16 RX ADMIN — CARBIDOPA AND LEVODOPA 1 TABLET: 25; 250 TABLET ORAL at 23:04

## 2023-07-16 RX ADMIN — CARBIDOPA AND LEVODOPA 1 TABLET: 25; 250 TABLET ORAL at 13:54

## 2023-07-16 RX ADMIN — SENNOSIDES AND DOCUSATE SODIUM 1 TABLET: 50; 8.6 TABLET ORAL at 20:46

## 2023-07-16 RX ADMIN — NITROFURANTOIN MONOHYDRATE/MACROCRYSTALLINE 100 MG: 25; 75 CAPSULE ORAL at 08:44

## 2023-07-16 RX ADMIN — NITROFURANTOIN MONOHYDRATE/MACROCRYSTALLINE 100 MG: 25; 75 CAPSULE ORAL at 16:43

## 2023-07-16 RX ADMIN — LEVETIRACETAM 500 MG: 500 TABLET, FILM COATED ORAL at 16:43

## 2023-07-16 ASSESSMENT — PATIENT HEALTH QUESTIONNAIRE - PHQ9
2. FEELING DOWN, DEPRESSED, IRRITABLE, OR HOPELESS: NOT AT ALL
SUM OF ALL RESPONSES TO PHQ9 QUESTIONS 1 AND 2: 0
1. LITTLE INTEREST OR PLEASURE IN DOING THINGS: NOT AT ALL

## 2023-07-16 ASSESSMENT — ENCOUNTER SYMPTOMS
RESPIRATORY NEGATIVE: 1
ROS GI COMMENTS: BM 7/15
PSYCHIATRIC NEGATIVE: 1
MYALGIAS: 1
NEUROLOGICAL NEGATIVE: 1

## 2023-07-16 ASSESSMENT — PAIN DESCRIPTION - PAIN TYPE
TYPE: ACUTE PAIN
TYPE: ACUTE PAIN

## 2023-07-16 NOTE — DISCHARGE INSTRUCTIONS
- Call or seek medical attention for questions or concerns  - Follow up with the [facility] RETURN: as needed  - Follow up with primary care provider within one weeks time  - Resume regular diet  - May take over the counter acetaminophen or ibuprofen as needed for pain  - Continue daily over the counter stool softener while on narcotics  - No operation of machinery or motorized vehicles while under the influence of narcotics  - No alcohol, marijuana or illicit drug use while under the influence of narcotics  - In the event of a narcotic overdose naloxone (Narcan) is available without a prescription from any [facility] or [facility]  - No swimming, hot tubs, baths or wound submersion until cleared by outpatient provider. May shower  - No contact sports, strenuous activities, or heavy lifting until cleared by outpatient provider  - If respiratory decompensation, persistent or worsening pain, change in condition or worsening condition, or signs or symptoms of infection occur seek medical attention  - Change left chest dressing in 48 hours - may leave open to air after that if wanted

## 2023-07-16 NOTE — DISCHARGE SUMMARY
Trauma Discharge Summary    DATE OF ADMISSION: 7/7/2023    DATE OF DISCHARGE: 7/17/2023    LENGTH OF STAY: 10 days    ATTENDING PHYSICIAN: Vale Nieves M.D.    CONSULTING PHYSICIAN:   1. Sindi Butterfield DO, physiatry     DISCHARGE DIAGNOSIS:  Principal Problem:    Traumatic hemothorax without open wound into thorax, initial encounter (POA: Yes)  Active Problems:    Fracture four ribs-closed, right, initial encounter (POA: Yes)    Lives in group home (POA: Yes)    Leukocytosis (POA: No)    Partial epilepsy with impairment of consciousness, intractable (HCC) (POA: Yes)      Overview: Since early chilhood      ICD-10 transition    Bipolar 1 disorder, depressed, moderate (HCC) (POA: Yes)      Overview: Since she was a child    Parkinson disease (HCC) (POA: Yes)    Prediabetes (POA: Yes)    No contraindication to deep vein thrombosis (DVT) prophylaxis (POA: Yes)    Intellectual disability (POA: Yes)    Trauma (POA: Yes)    Class 3 severe obesity in adult (HCC) (POA: Yes)  Resolved Problems:    * No resolved hospital problems. *      PROCEDURES:  1.  On 7/8/2023 Dr. Vale Nieves performed a right tube thoracostomy    HISTORY OF PRESENT ILLNESS: The patient is a 42 y.o. female who was reportedly injured in a when she fell in the bathroom.  She was initially seen secondary to rib pain discharged home with pain medications.  She returned 4 days later with shortness of breath.  She was found to have a hemopneumothorax..  She was transferred to [facility] in [place], [place].    HOSPITAL COURSE: The patient was triaged as a nontrauma activation. The patient was [facility] to [facility].  She remained on the [facility] for several days.  She did have a tiny recurrent pneumothorax for which her chest tube was placed back to suction follow-up imaging noted no pneumothorax and eventually her chest tube was removed.  She has had adequate pain control.  She is on room air.  She is participating in pulmonary hygiene.  Her  white blood cell count was noted to be elevated.  A UA showed a urinary tract infection for which she has been started on antibiotics for.  Her white blood cell count was trending down on day of discharge.  She will need to complete the antibiotics as prescribed.    HOSPITAL PROBLEM LIST:  * Traumatic hemothorax without open wound into thorax, initial encounter- (present on admission)  Assessment & Plan  Small right hemopneumothorax.  7/7 Chest tube placed in ED.   7/11 Chest xray with increased pneumo without air leak. Increased suction to - 30 cm.  7/12 Chest xray with resolved right pneumothorax. Chest tube with 130 mL of serosanguinous output over last 24 hours. Decreased suction to - 20 cm.  7/13 Chest xray with recurrent 6 mm right apical pneumothorax, no air leak appreciated. Chest tube with 120 mL of serous output over 24 hours.   7/15 Chest tube removed  7/16 Follow up imaging pending    Leukocytosis  Assessment & Plan  7/12 WBC increased to 14.8. Patient afebrile and tolerating room air.  - Chest xray with stable atelectasis.  - Urinalysis positive   - Bilateral lower extremity duplex negative  7/15 Transitioned to Macrobid     Lives in group [place]- (present on admission)  Assessment & Plan  Lives at [facility].  7/12 Accepted by [facility].    Fracture four ribs-closed, right, initial encounter- (present on admission)  Assessment & Plan  Right 6th, 7th, and 8th right posterior rib fractures and possibly 9th posterior rib fracture.  Aggressive pulmonary hygiene and multimodal pain management and serial chest radiography.    No contraindication to deep vein thrombosis (DVT) prophylaxis- (present on admission)  Assessment & Plan  Prophylactic dose enoxaparin 40 mg BID initiated upon admission.    Prediabetes- (present on admission)  Assessment & Plan  Chronic condition treated with metformin.  Resumed maintenance medication on admission.    Parkinson disease (HCC)- (present on  admission)  Assessment & Plan  Chronic condition treated with carbidopa-levodopa.  Resumed maintenance medication on admission.    Bipolar 1 disorder, depressed, moderate (HCC)- (present on admission)  Assessment & Plan  Chronic condition treated with Geodon.  Resumed maintenance medication on admission.    Partial epilepsy with impairment of consciousness, intractable (HCC)- (present on admission)  Assessment & Plan  Chronic condition treated with Lamictal and Keppra.  Resumed maintenance medication on admission.    Class 3 severe obesity in adult (HCC)- (present on admission)  Assessment & Plan  BMI 42.16    Trauma- (present on admission)  Assessment & Plan  Fell in bathroom on 7/4 and discharged home from ED.   Presented back to ER with worsening pain.  Non Trauma Activation.  Vale Nieves MD. Trauma Surgery.          DISPOSITION: Discharged home on 7/17/2023. The patient was counseled and questions were answered. Specifically, signs and symptoms of infection, respiratory decompensation, change in condition or worsening condition and persistent or worsening pain were discussed and the patient agrees to seek medical attention if any of these develop.    DISCHARGE MEDICATIONS:  The patients controlled substance history was reviewed and a controlled substance use informed consent (if applicable) was provided by [facility] and the patient has been prescribed.     Medication List        START taking these medications        Instructions   nitrofurantoin 100 MG Caps  Commonly known as: Macrobid   Take 1 Capsule by mouth 2 times a day with meals for 5 days.  Dose: 100 mg            CHANGE how you take these medications        Instructions   metFORMIN  MG Tb24  What changed: See the new instructions.  Commonly known as: Glucophage XR   TAKE 1 TABLET BY MOUTH ONCE DAILY FOR PRE-DIABETES.            CONTINUE taking these medications        Instructions   carbidopa-levodopa  MG  Tabs  Commonly known as: Sinemet   Take 1 Tab by mouth 3 times a day.  Dose: 1 Tablet     lamoTRIgine 25 MG Tabs  Commonly known as: LaMICtal   Take 1 Tab by mouth every day.  Dose: 25 mg     levETIRAcetam 500 MG Tabs  Commonly known as: Keppra   Take 1 Tab by mouth 3 times a day.  Dose: 500 mg     Multivitamin Tabs   TAKE 1 TABLET BY MOUTH ONCE DAILY.     Prevail Super Plus XLarge Misc   WEAR DAILY, CHANGE AS NEEDED.     thiamine 100 MG Tabs  Commonly known as: Vitamin B-1   Take 100 mg by mouth every day.  Dose: 100 mg     ziprasidone 60 MG Caps  Commonly known as: Geodon   Take 60 mg by mouth every evening.  Dose: 60 mg              ACTIVITY:  As tolerated    WOUND CARE:  May take chest tube site dressing off and leave open to air.  Over-the-counter antibiotic ointment to any abrasions as needed.    DIET:  Orders Placed This Encounter   Procedures    Diet Order Diet: Consistent CHO (Diabetic)     Standing Status:   Standing     Number of Occurrences:   1     Order Specific Question:   Diet:     Answer:   Consistent CHO (Diabetic) [4]       FOLLOW UP:  [facility]  [address]  [place] [id number] [phone]  Follow up  only as needed      TIME SPENT ON DISCHARGE: 35 minutes      ____________________________________________  JAVIER James    DD: 7/16/2023 9:35 AM

## 2023-07-16 NOTE — PROGRESS NOTES
Ana care and bed bath provided with Tri BARKER. Oral care provided, shower cap, hairbrush, and barrier cream on genitals and beneath pannus. Erin was abl;e to assist with self care. Discharge upcoming, patient notified.

## 2023-07-16 NOTE — PROGRESS NOTES
Assumed care of patient. Assessment performed. Purewick placed due to urinary incontinence. Chux changed and bed alarm placed. Some yazmin care provided; will do complete care. Patient does not report any pain or soreness. No reported dypnea, normal work of breathing. Reports no pain or needs at this time. IV reinforced with tape and patient educated on importance of leaving IV in place.

## 2023-07-16 NOTE — PROGRESS NOTES
Trauma / Surgical Daily Progress Note    Date of Service  7/16/2023    Chief Complaint  42 y.o. female admitted 7/7/2023 as a non trauma activation - Fall in bathroom 3 d PTA - Right rib fx 6-9 and pneumothorax.    Interval Events  WBC 13.9 (16.1)  CXR with small apical pneumo post CT removal  Room air     Discharge back to group home today  Macrobid RX and resume all other home meds     Review of Systems  Review of Systems   Constitutional:  Positive for malaise/fatigue.   HENT: Negative.     Respiratory: Negative.     Gastrointestinal:         BM 7/15   Genitourinary:         Voiding   Musculoskeletal:  Positive for myalgias.   Neurological: Negative.    Psychiatric/Behavioral: Negative.     All other systems reviewed and are negative.       Vital Signs  Temp:  [36.9 °C (98.4 °F)-37.2 °C (99 °F)] 37.2 °C (99 °F)  Pulse:  [] 90  Resp:  [17-19] 17  BP: (109-146)/(60-96) 117/78  SpO2:  [83 %-94 %] 90 %    Physical Exam  Physical Exam  Vitals and nursing note reviewed.   Constitutional:       General: She is not in acute distress.     Appearance: Normal appearance.   HENT:      Right Ear: External ear normal.      Left Ear: External ear normal.      Nose: Nose normal.      Mouth/Throat:      Mouth: Mucous membranes are moist.   Pulmonary:      Effort: Pulmonary effort is normal. No respiratory distress.      Comments: Previous chest tube site dressed   Chest:      Chest wall: No tenderness.   Abdominal:      General: There is no distension.      Palpations: Abdomen is soft.      Tenderness: There is no abdominal tenderness.   Musculoskeletal:         General: No tenderness.      Cervical back: No muscular tenderness.   Skin:     General: Skin is warm.      Capillary Refill: Capillary refill takes less than 2 seconds.   Neurological:      General: No focal deficit present.      Mental Status: She is alert. Mental status is at baseline.   Psychiatric:         Attention and Perception: Attention normal.          Mood and Affect: Affect is labile.         Behavior: Behavior normal.         Thought Content: Thought content normal.       Core Measures & Quality Metrics  Medications reviewed, Labs reviewed and Radiology images reviewed  Tolbert catheter: No Tolbert      DVT Prophylaxis: Enoxaparin (Lovenox)  DVT prophylaxis - mechanical: SCDs  Ulcer prophylaxis: Not indicated    Assessed for rehab: Patient was assess for and/or received rehabilitation services during this hospitalization    RAP Score Total: 6    CAGE Results: negative Blood Alcohol>0.08: no       Assessment/Plan  * Traumatic hemothorax without open wound into thorax, initial encounter- (present on admission)  Assessment & Plan  Small right hemopneumothorax.  7/7 Chest tube placed in ED.   7/11 Chest xray with increased pneumo without air leak. Increased suction to - 30 cm.  7/12 Chest xray with resolved right pneumothorax. Chest tube with 130 mL of serosanguinous output over last 24 hours. Decreased suction to - 20 cm.  7/13 Chest xray with recurrent 6 mm right apical pneumothorax, no air leak appreciated. Chest tube with 120 mL of serous output over 24 hours.   7/15 Chest tube removed  7/16 Follow up imaging pending    Leukocytosis  Assessment & Plan  7/12 WBC increased to 14.8. Patient afebrile and tolerating room air.  - Chest xray with stable atelectasis.  - Urinalysis positive   - Bilateral lower extremity duplex negative  7/15 Transitioned to Macrobid     Lives in group home- (present on admission)  Assessment & Plan  Lives at [facility].  7/12 Accepted by [facility].    Fracture four ribs-closed, right, initial encounter- (present on admission)  Assessment & Plan  Right 6th, 7th, and 8th right posterior rib fractures and possibly 9th posterior rib fracture.  Aggressive pulmonary hygiene and multimodal pain management and serial chest radiography.    No contraindication to deep vein thrombosis (DVT) prophylaxis- (present on  admission)  Assessment & Plan  Prophylactic dose enoxaparin 40 mg BID initiated upon admission.    Prediabetes- (present on admission)  Assessment & Plan  Chronic condition treated with metformin.  Resumed maintenance medication on admission.    Parkinson disease (HCC)- (present on admission)  Assessment & Plan  Chronic condition treated with carbidopa-levodopa.  Resumed maintenance medication on admission.    Bipolar 1 disorder, depressed, moderate (HCC)- (present on admission)  Assessment & Plan  Chronic condition treated with Geodon.  Resumed maintenance medication on admission.    Partial epilepsy with impairment of consciousness, intractable (HCC)- (present on admission)  Assessment & Plan  Chronic condition treated with Lamictal and Keppra.  Resumed maintenance medication on admission.    Class 3 severe obesity in adult (HCC)- (present on admission)  Assessment & Plan  BMI 42.16    Trauma- (present on admission)  Assessment & Plan  Fell in bathroom on 7/4 and discharged home from ED.   Presented back to ER with worsening pain.  Non Trauma Activation.  Vale Nieves MD. Trauma Surgery.    Discussed patient condition with RN, Patient, and Dr. Nieves .

## 2023-07-16 NOTE — PROGRESS NOTES
Spoke with Nay Seals and will have to hold dc plans to , CM made aware and number to call #[phone].

## 2023-07-16 NOTE — DISCHARGE PLANNING
Case Management Discharge Planning    Admission Date: 7/7/2023  GMLOS: 3.1  ALOS: 9    6-Clicks ADL Score: 17  6-Clicks Mobility Score: 16  PT and/or OT Eval ordered: Yes  Post-acute Referrals Ordered: Yes  Post-acute Choice Obtained: Yes  Has referral(s) been sent to post-acute provider:  Yes      Anticipated Discharge Dispo: Discharge Disposition: D/T to facility providing skilled nursing/supportive care (04)  [facility] [place] [place] [address]  Discharge Contact Phone Number: [phone] (guardian/Nay Seals)    DME Needed: No    Action(s) Taken: Updated Provider/Nurse on Discharge Plan, Patient Conference, and OTHER    Escalations Completed: None    Medically Clear: Yes    Next Steps: f/u with group home for discharge / transportation     Barriers to Discharge: Pending Placement    Is the patient up for discharge tomorrow: No      POLI placed a call to several of the telephone numbers that was provided by Nay Seals. POLI left contact information and also sent an email with POLI's contact information.   POLI eventually received a call back from Nay Seals and she stated that the patient may not be able to return to the group home today.     POLI was provided the number for [facility] @ [phone]. POLI placed a call to the number provided and spoke with Hollie. Per Hollie they will send some one over to  the patient.   POLI requested that the nurse be given an hour to get the patient ready.   POLI spoke with Nay salinas and again, she gave verbal consent for the IMM.   Nay requested that the discharge summary be faxed to [phone].  POLI received a request to call Hollie at the group home. Per Hollie, [facility] is not able to take the patient today because they need to have a discharge meeting. POLI informed Hollie that she need to communicate that with Nay Seals and that Nay is aware that the patient is for discharge today.   POLI also informed them of possible fines by Medicare if the patient is not  picked [facility] she is medically cleared for discharge and has a place to go.

## 2023-07-16 NOTE — CARE PLAN
The patient is Stable - Low risk of patient condition declining or worsening    Problem: Pain - Standard  Goal: Alleviation of pain or a reduction in pain to the patient’s comfort goal  7/16/2023 0931 by Kiersten Fuentes R.N.  Outcome: Progressing  Note: Patient has been educated on the pain scale and has been encouraged to voice any pain or discomfort. Pain will remain at a tolerable level.  7/16/2023 0931 by Kiersten Fuentes R.N.  Outcome: Progressing     Problem: Knowledge Deficit - Standard  Goal: Patient and family/care givers will demonstrate understanding of plan of care, disease process/condition, diagnostic tests and medications  7/16/2023 0931 by Kiersten Fuentes R.N.  Outcome: Progressing  Note: Patient will be educated on the importance of nursing activities, medications, and updated on the plan of care. Will verbalized understanding and is encouraged to voice any questions or concerns. Patient reminded to use call light for needs and educated on placement of purewick,   7/16/2023 0931 by Kiersten Fuentes R.N.  Outcome: Progressing     Problem: Fall Risk  Goal: Patient will remain free from falls  7/16/2023 0931 by TOAÑ DunneNNeymar  Outcome: Progressing  Note: Patient will remain free from falls. Educated on importance of using call light, not exiting bed. Bed alarm as been placed. Patient will use assistive devices effectively to prevent falls and assist with mobility.  7/16/2023 0931 by Kiersten Fuentes R.N.  Outcome: Progressing     Shift Goals  Clinical Goals: Pain control, monitor breath sounds, discharge  Patient Goals: Rest  Family Goals: Not present    Progress made toward(s) clinical / shift goals:  patient's pain has been under control (no pain reported), patient has used call light and understands performed nursing activities.    Patient is not progressing towards the following goals:

## 2023-07-16 NOTE — WOUND TEAM
[facility]  Inpatient Services  Wound and Skin Care Brief Evaluation    Admission Date: 7/7/2023     Last order of IP CONSULT TO WOUND CARE was found on 7/14/2023 from Hospital Encounter on 7/7/2023     HPI, PMH, SH: Reviewed    Chief Complaint   Patient presents with    Rib Pain    Sent by MD    Other     Hypoxia at follow-up appointment     Diagnosis: Traumatic hemothorax without open wound into thorax, initial encounter [S27.1XXA]    Unit where seen by Wound Team: T317/01     Wound consult placed regarding R chest. Chart and images reviewed.This RN in to assess patient. Pt pleasant and agreeable. Partial thickness skin tear to R chest near chest tube insertion site, likely from removal of tape on dressing. Both this and chest tube insertion site were cleansed and covered with Xeroform (change q48h) and a securing dressing.     No pressure injuries or advanced wound care needs identified. Wound consult completed. Wound team signing off, re-consult if patient has further advanced wound care needs.    Wound 07/15/23 Partial Thickness Wound Breast Lateral Right (Active)   Date First Assessed/Time First Assessed: 07/15/23 1746   Present on Original Admission: No  Primary Wound Type: Partial Thickness Wound  Location: Breast  Wound Orientation: Lateral  Laterality: Right      Assessments 7/15/2023  5:00 PM   Wound Image     Site Assessment Pink;Red   Periwound Assessment Clean;Dry;Intact   Margins Defined edges;Attached edges   Closure Secondary intention   Drainage Amount Scant   Drainage Description Serosanguineous   Treatments Cleansed   Wound Cleansing Normal Saline Irrigation   Dressing Status Clean;Dry;Intact   Dressing Changed Changed   Dressing Cleansing/Solutions Not Applicable   Dressing Options Petrolatum Gauze (yellow);Bandaid   Dressing Change/Treatment Frequency Every 48 hrs, and As Needed   NEXT Dressing Change/Treatment Date 07/17/23   NEXT Weekly Photo (Inpatient Only) 07/22/23   Wound  Team Following Not following   Non-staged Wound Description Partial thickness       PREVENTATIVE INTERVENTIONS:    Q shift Kee - performed per nursing policy  Q shift pressure point assessments - performed per nursing policy

## 2023-07-16 NOTE — CARE PLAN
The patient is Stable - Low risk of patient condition declining or worsening    Shift Goals  Clinical Goals: chest tube clamped at 0600, monitor SOB, CXR at 1000  Patient Goals: rest  Family Goals: not present    Progress made toward(s) clinical / shift goals:      Problem: Pain - Standard  Goal: Alleviation of pain or a reduction in pain to the patient’s comfort goal  Outcome: Progressing  Note: Pt reports no pain. Pillows for comfort and repositioning.       Problem: Knowledge Deficit - Standard  Goal: Patient and family/care givers will demonstrate understanding of plan of care, disease process/condition, diagnostic tests and medications  Outcome: Progressing  Note: POC discussed with pt at bedside regarding chest tube removal, mobility, and discharge disposition. Pt asks appropriate questions, no additional concerns at this time.       Problem: Skin Integrity  Goal: Skin integrity is maintained or improved  Outcome: Progressing  Note: Wound consulted for excoriation to R breast and pannus. TAPs in use, pt able to reposition self, barrier cream applied, frequent moisture/incontinent checks.       Patient is not progressing towards the following goals:

## 2023-07-16 NOTE — PROGRESS NOTES
[phone] (number given by Erin chadwick for provider).    @1430, med pass completed. Bandage on right breast changed as it had dislodged due to movement and sweat. Chest tube site has no drainage or purulence. Cleansed with normal saline, dried, then layered with petrolatum gauze, biatin foam, and finally a silicone adhesive dressing. Patient tolerates well with minimal pain and discomfort.

## 2023-07-17 VITALS
WEIGHT: 223.11 LBS | RESPIRATION RATE: 16 BRPM | HEART RATE: 83 BPM | TEMPERATURE: 98.8 F | OXYGEN SATURATION: 90 % | HEIGHT: 61 IN | SYSTOLIC BLOOD PRESSURE: 108 MMHG | BODY MASS INDEX: 42.12 KG/M2 | DIASTOLIC BLOOD PRESSURE: 72 MMHG

## 2023-07-17 PROCEDURE — 700111 HCHG RX REV CODE 636 W/ 250 OVERRIDE (IP): Mod: JZ | Performed by: NURSE PRACTITIONER

## 2023-07-17 PROCEDURE — 97535 SELF CARE MNGMENT TRAINING: CPT

## 2023-07-17 PROCEDURE — 99024 POSTOP FOLLOW-UP VISIT: CPT | Performed by: NURSE PRACTITIONER

## 2023-07-17 PROCEDURE — 700102 HCHG RX REV CODE 250 W/ 637 OVERRIDE(OP): Performed by: NURSE PRACTITIONER

## 2023-07-17 PROCEDURE — 97530 THERAPEUTIC ACTIVITIES: CPT

## 2023-07-17 PROCEDURE — A9270 NON-COVERED ITEM OR SERVICE: HCPCS | Performed by: NURSE PRACTITIONER

## 2023-07-17 PROCEDURE — 700102 HCHG RX REV CODE 250 W/ 637 OVERRIDE(OP): Performed by: SURGERY

## 2023-07-17 PROCEDURE — 97116 GAIT TRAINING THERAPY: CPT

## 2023-07-17 PROCEDURE — A9270 NON-COVERED ITEM OR SERVICE: HCPCS | Performed by: SURGERY

## 2023-07-17 RX ORDER — NITROFURANTOIN 25; 75 MG/1; MG/1
100 CAPSULE ORAL 2 TIMES DAILY WITH MEALS
Qty: 10 CAPSULE | Refills: 0 | Status: ACTIVE | OUTPATIENT
Start: 2023-07-17 | End: 2023-07-22

## 2023-07-17 RX ORDER — BACITRACIN ZINC AND POLYMYXIN B SULFATE 500; 1000 [USP'U]/G; [USP'U]/G
1 OINTMENT TOPICAL 2 TIMES DAILY
Refills: 0 | Status: ACTIVE | COMMUNITY
Start: 2023-07-17 | End: 2023-07-22

## 2023-07-17 RX ADMIN — NITROFURANTOIN MONOHYDRATE/MACROCRYSTALLINE 100 MG: 25; 75 CAPSULE ORAL at 07:47

## 2023-07-17 RX ADMIN — POLYETHYLENE GLYCOL 3350 1 PACKET: 17 POWDER, FOR SOLUTION ORAL at 06:03

## 2023-07-17 RX ADMIN — ENOXAPARIN SODIUM 40 MG: 100 INJECTION SUBCUTANEOUS at 07:47

## 2023-07-17 RX ADMIN — CARBIDOPA AND LEVODOPA 1 TABLET: 25; 250 TABLET ORAL at 14:15

## 2023-07-17 RX ADMIN — LAMOTRIGINE 25 MG: 100 TABLET ORAL at 06:02

## 2023-07-17 RX ADMIN — MAGNESIUM HYDROXIDE 30 ML: 1200 LIQUID ORAL at 06:03

## 2023-07-17 RX ADMIN — ZIPRASIDONE HYDROCHLORIDE 60 MG: 60 CAPSULE ORAL at 06:02

## 2023-07-17 RX ADMIN — CARBIDOPA AND LEVODOPA 1 TABLET: 25; 250 TABLET ORAL at 06:01

## 2023-07-17 RX ADMIN — METFORMIN HYDROCHLORIDE 500 MG: 500 TABLET ORAL at 07:47

## 2023-07-17 RX ADMIN — LEVETIRACETAM 500 MG: 500 TABLET, FILM COATED ORAL at 06:02

## 2023-07-17 RX ADMIN — DOCUSATE SODIUM 100 MG: 100 CAPSULE, LIQUID FILLED ORAL at 06:02

## 2023-07-17 ASSESSMENT — GAIT ASSESSMENTS
ASSISTIVE DEVICE: FRONT WHEEL WALKER
DISTANCE (FEET): 200
GAIT LEVEL OF ASSIST: STANDBY ASSIST

## 2023-07-17 ASSESSMENT — COGNITIVE AND FUNCTIONAL STATUS - GENERAL
HELP NEEDED FOR BATHING: A LOT
DRESSING REGULAR LOWER BODY CLOTHING: A LITTLE
MOVING FROM LYING ON BACK TO SITTING ON SIDE OF FLAT BED: A LITTLE
STANDING UP FROM CHAIR USING ARMS: A LITTLE
SUGGESTED CMS G CODE MODIFIER DAILY ACTIVITY: CK
MOBILITY SCORE: 17
CLIMB 3 TO 5 STEPS WITH RAILING: A LOT
SUGGESTED CMS G CODE MODIFIER MOBILITY: CK
TURNING FROM BACK TO SIDE WHILE IN FLAT BAD: A LITTLE
DRESSING REGULAR UPPER BODY CLOTHING: A LITTLE
TOILETING: A LOT
DAILY ACTIVITIY SCORE: 18
WALKING IN HOSPITAL ROOM: A LITTLE
MOVING TO AND FROM BED TO CHAIR: A LITTLE

## 2023-07-17 ASSESSMENT — PAIN DESCRIPTION - PAIN TYPE: TYPE: ACUTE PAIN

## 2023-07-17 NOTE — DISCHARGE PLANNING
Received Choice Form @: 1206  Agency/ Facility Name: [facility]   Referral Sent per Choice Form @: Not sent as there are no orders       Received Choice Form @: 1206  Agency/ Facility Name: [facility]  Referral Sent per Choice Form @: 1500

## 2023-07-17 NOTE — DISCHARGE PLANNING
Case Management Discharge Planning    Admission Date: 7/7/2023  GMLOS: 3.1  ALOS: 10    6-Clicks ADL Score: 18  6-Clicks Mobility Score: 17  PT and/or OT Eval ordered: Yes  Post-acute Referrals Ordered: Yes  Post-acute Choice Obtained: Yes  Has referral(s) been sent to post-acute provider:  Yes      Anticipated Discharge Dispo: Discharge Disposition: D/T to facility providing jail/supportive care (04)  Discharge Address: [place] [address]  Discharge Contact Phone Number: [phone] (guardian/Nay Seals)    DME Needed: Yes    DME Ordered: Yes    Action(s) Taken: Updated Provider/Nurse on Discharge Plan, Patient Conference, Family Conference, and OTHER    Escalations Completed: None    Medically Clear: Yes    Next Steps: have medications sent to the [facility].     Barriers to Discharge: None    Is the patient up for discharge today: Yes    Is transport arranged for discharge disposition: Yes    Discharge meeting for the patient to return to the [facility] was held today @ 11:45.  In attendance was this writer, Matilda Stroud, supervisor of the [facility], Dayana Nieto Knight  and Valeriano Rollins.  The meeting was held via [employer] Teams media.     The patient's abilities and medication administration was discussed along with her discharge needs.    Requested from case management was that the patient be providied a walker; verbal consent to obtain was received from Dayana Seals. Requested also was for the discharge summary to be faxed to [phone] and the discharge medications to be sent to the [facility] pharmacy.     Per Matilda the patient will be picked up at approximately 15:00.  provided Matilda with the direct extension telephone number to inform this writer of when transportation was on the way.   Matilda provided her telephone  number- [phone]. Per Matilda the patient will be provided with the tub bench chair that she will need and she will take it from another  facility.     POLI placed a Voalte to Ciara Ayala and requested the discharge summary and for the medications to be sent to the [facility] pharmacy.    POLI received a call from Matilda with the name of the staff that will  the patient; Stephenie Schulte --[phone].

## 2023-07-17 NOTE — CARE PLAN
The patient is Stable - Low risk of patient condition declining or worsening    Shift Goals  Clinical Goals: Safety; mobility  Patient Goals: comfort  Family Goals: not present    Progress made toward(s) clinical / shift goals:    Problem: Pain - Standard  Goal: Alleviation of pain or a reduction in pain to the patient’s comfort goal  Outcome: Met     Problem: Knowledge Deficit - Standard  Goal: Patient and family/care givers will demonstrate understanding of plan of care, disease process/condition, diagnostic tests and medications  Outcome: Met     Problem: Skin Integrity  Goal: Skin integrity is maintained or improved  Outcome: Met     Problem: Fall Risk  Goal: Patient will remain free from falls  Outcome: Met     Problem: Bowel Elimination  Goal: Establish and maintain regular bowel function  Outcome: Met     Problem: Respiratory  Goal: Patient will achieve/maintain optimum respiratory ventilation and gas exchange  Outcome: Met       Patient is not progressing towards the following goals:

## 2023-07-17 NOTE — PROGRESS NOTES
Patient transported off unit via wheelchair with one bag of belongings. Patient picked up by group home. Caregiver Stephenie received patient and able to sign patient discharge.

## 2023-07-17 NOTE — THERAPY
"Occupational Therapy  Daily Treatment     Patient Name: Erin Santiago  Age:  42 y.o., Sex:  female  Medical Record #: [id number]  Today's Date: 7/17/2023     Precautions  Precautions: Fall Risk    Assessment  Pt participated in standing and seated ADLs. Pt presented with decreased dynamic standing balance w/FWW in order to complete sponge bath at bedside. Pt required modA to assist with bathing while standing. Pt would benefit from continued OT services to increase IND w/ADLs.. Continue POC.    Plan  Treatment Plan Status: (P) Continue Current Treatment Plan  Type of Treatment: Self Care / Activities of Daily Living, Neuro Re-Education / Balance, Therapeutic Activity  Treatment Frequency: 3 Times per Week  Treatment Duration: Until Therapy Goals Met    DC Equipment Recommendations: (P) Tub Transfer Bench  Discharge Recommendations: (P) Recommend home health for continued occupational therapy services    Subjective  \"My birthday is in 3 days!\"     Objective   07/17/23 0926   Precautions   Precautions Fall Risk   Pain 0 - 10 Group   Therapist Pain Assessment   (Pt denied any reports of pain or SOB during ADLs.)   Cognition    Cognition / Consciousness X   Level of Consciousness Alert   Initiation Impaired   Comments Pt pleasant and cooperative. Pt requires min-mod vc's to initiate tasks (grooming, bathing, LBD)   Active ROM Upper Body   Active ROM Upper Body  WDL   Balance   Sitting Balance (Static) Good   Sitting Balance (Dynamic) Fair +   Standing Balance (Static) Fair +   Standing Balance (Dynamic) Fair   Weight Shift Sitting Good   Weight Shift Standing Fair   Skilled Intervention Verbal Cuing;Facilitation   Comments   (w/FWW)   Bed Mobility    Supine to Sit Supervised  (HOB elevated)   Scooting Supervised   Skilled Intervention Verbal Cuing   Comments   (VC's for initiating bed mobility)   Activities of Daily Living   Grooming Seated;Supervision   Bathing Moderate Assist  (Pt had BM upon OT's arrival. Pt " required modA to sit EOB and perform STS to complete sponge bath at bedside. Pt attempted to stand and wipe herself but had difficulty holding onto walker while maintaining her balance to wipe perineal area.)   Lower Body Dressing Supervision  (donned B socks and underwear while sitting EOB in figure 4 position)   Toileting   (NT however, pt would require Alex to assist with toilet hygiene and clothing management while standing)   Skilled Intervention Verbal Cuing;Facilitation   How much help from another person does the patient currently need...   Putting on and taking off regular lower body clothing? 3   Bathing (including washing, rinsing, and drying)? 2   Toileting, which includes using a toilet, bedpan, or urinal? 2   Putting on and taking off regular upper body clothing? 3   Taking care of personal grooming such as brushing teeth? 4   Eating meals? 4   6 Clicks Daily Activity Score 18   Functional Mobility   Sit to Stand Standby Assist  (x2 trials of STS w/FWW)   Bed, Chair, Wheelchair Transfer Standby Assist  (Pt ambulated from EOB<chair using FWW w/SBA.)   Mobility   (EOB<standard chair)   Activity Tolerance   Sitting in Chair +8 mins   Sitting Edge of Bed +10 mins   Standing <5 mins   Short Term Goals   Short Term Goal # 1 Pt will complete functional ADL txf w/spv   Goal Outcome # 1 Goal met, new goal added   Short Term Goal # 1 B  Pt will complete toilet hygiene seated w/supv.   Goal Outcome  # 1 B Progressing slower than expected   Short Term Goal # 2 Pt will complete grooming task while standing w/spv.   Goal Outcome # 2 Progressing slower than expected   Short Term Goal # 3 Pt will complete simulated tub txf w/spv.   Goal Outcome # 3 Progressing slower than expected   Education Group   Education Provided Role of Occupational Therapist;Activities of Daily Living   Role of Occupational Therapist Patient Response Patient;Acceptance;Verbal Demonstration;Explanation   ADL Patient Response  Patient;Acceptance;Explanation;Demonstration;Verbal Demonstration;Action Demonstration   Occupational Therapy Treatment Plan    O.T. Treatment Plan Continue Current Treatment Plan   Anticipated Discharge Equipment and Recommendations   DC Equipment Recommendations Tub Transfer Bench   Discharge Recommendations Recommend home health for continued occupational therapy services   Interdisciplinary Plan of Care Collaboration   IDT Collaboration with  Nursing;;Physical Therapist   Patient Position at End of Therapy   (Handed off to PT)   Collaboration Comments RN and CM updated regarding pt's functional status

## 2023-07-17 NOTE — THERAPY
Physical Therapy   Daily Treatment     Patient Name: Erin Santiago  Age:  42 y.o., Sex:  female  Medical Record #: [id number]  Today's Date: 7/17/2023     Precautions  Precautions: Fall Risk    Assessment    Pt found in bed and incontinent of BM. Pt able to assist self to EOB. Assistance and extra time performing pericare. Assisted pt to chair with FWW. No LOB with ADLs in standing. Amb in halls, pt able to open doors without LOB and with FWW. Pt progressing towards goals. Up in chair at end of treatment. Pt will continue to benefit from acute physical therapy to assist progress towards pt's goals. Anticipate pt will benefit from home with home health upon DC from hospital.     Plan    Treatment Plan Status: Continue Current Treatment Plan  Type of Treatment: Gait Training, Neuro Re-Education / Balance, Therapeutic Activities, Therapeutic Exercise  Treatment Frequency: 3 Times per Week  Treatment Duration: Until Therapy Goals Met    DC Equipment Recommendations: Front-Wheel Walker (see OT notes for additional equipment needs)  Discharge Recommendations: Recommend home health for continued physical therapy services (pt should be able to return to group home with home health services and FWW)      Subjective    CM requesting updated notes to update group home on pt's mobility. Pt agreed to therapy.      Objective       07/17/23 0927   Precautions   Precautions Fall Risk   Pain 0 - 10 Group   Pain Rating Scale (NPRS) 0   Cognition    Level of Consciousness Alert   Initiation Impaired   Comments pt did not call nurse when she noticed that she had incontinence of BM   Other Treatments   Other Treatments Provided assisted with ADLs, balance training   Balance   Sitting Balance (Static) Good   Sitting Balance (Dynamic) Fair +   Standing Balance (Static) Fair +   Standing Balance (Dynamic) Fair +   Weight Shift Sitting Good   Weight Shift Standing Fair   Skilled Intervention Verbal Cuing   Comments worked on functional  activities and balance actviites, no LOB with and without FWW   Bed Mobility    Supine to Sit Supervised   Scooting Supervised   Rolling Supervised   Skilled Intervention Verbal Cuing   Gait Analysis   Gait Level Of Assist Standby Assist   Assistive Device Front Wheel Walker   Distance (Feet) 200   # of Times Distance was Traveled 1   Deviation Bradykinetic;Increased Base Of Support;Decreased Toe Off;Decreased Heel Strike;Shuffled Gait  (pt stated she usually walks with her shoes, pt with shortened toes and small feet causing shuffling like gait)   Skilled Intervention Verbal Cuing   Comments no SOB   Functional Mobility   Sit to Stand Standby Assist   Bed, Chair, Wheelchair Transfer Standby Assist   Transfer Method Stand Step   Mobility EOB seated ADls and while sitting in chair   Skilled Intervention Verbal Cuing   How much difficulty does the patient currently have...   Turning over in bed (including adjusting bedclothes, sheets and blankets)? 3   Sitting down on and standing up from a chair with arms (e.g., wheelchair, bedside commode, etc.) 3   Moving from lying on back to sitting on the side of the bed? 3   How much help from another person does the patient currently need...   Moving to and from a bed to a chair (including a wheelchair)? 3   Need to walk in a hospital room? 3   Climbing 3-5 steps with a railing? 2   6 clicks Mobility Score 17   Activity Tolerance   Comments pt able to tolerate all activity for 30 minutes in different positions, no c/o SOB or fatigue   Short Term Goals    Short Term Goal # 1 Pt will be IND with bed mobility by tx   Goal Outcome # 1 Progressing as expected   Short Term Goal # 2 Pt will transfer with FWW a S level for DC home by tx 6   Goal Outcome # 2 Progressing as expected   Short Term Goal # 3 Gait with FWW for 100 ft with S by tx 6   Goal Outcome # 3 Progressing as expected   Education Group   Education Provided Role of Physical Therapist;Gait Training;Use of Assistive  Device   Role of Physical Therapist Patient Response Patient;Acceptance;Explanation;Verbal Demonstration   Gait Training Patient Response Patient;Acceptance;Explanation;Action Demonstration   Use of Assistive Device Patient Response Patient;Acceptance;Explanation   Physical Therapy Treatment Plan   Physical Therapy Treatment Plan Continue Current Treatment Plan   Treatment Plan  Gait Training;Neuro Re-Education / Balance;Therapeutic Activities;Therapeutic Exercise   Treatment Frequency 3 Times per Week   Duration Until Therapy Goals Met   Anticipated Discharge Equipment and Recommendations   DC Equipment Recommendations Front-Wheel Walker  (see OT notes for additional equipment needs)   Discharge Recommendations Recommend home health for continued physical therapy services  (pt should be able to return to group home with home health services and FWW)   Interdisciplinary Plan of Care Collaboration   IDT Collaboration with  Nursing;;Occupational Therapist   Patient Position at End of Therapy Seated;Chair Alarm On;Call Light within Reach;Tray Table within Reach   Collaboration Comments RN and CM updated

## 2023-07-17 NOTE — CARE PLAN
The patient is Stable - Low risk of patient condition declining or worsening    Shift Goals  Clinical Goals: Pain Management, Monitor Respirations  Patient Goals: Rest, Sleep  Family Goals: NIKA    Progress made toward(s) clinical / shift goals:     Problem: Fall Risk  Goal: Patient will remain free from falls    Description  Target End Date: Prior to discharge or change in level of care     Document interventions on the Sejal Leggett Fall Risk Assessment   1. Assess for fall risk factors   2. Implement fall precautions    Outcome: Progressing  Note: Encouraged and instructed to call for assistance. Bed alarm on. Side rails up. Call button within reach. Provided walker for ambulation and transfers. Placed one staff assists.    Problem: Pain - Standard  Goal: Alleviation of pain or a reduction in pain to the patient’s comfort goal    Description  Target End Date: Prior to discharge or change in level of care     Document on Vitals flowsheet   1. Document pain using the appropriate pain scale per order or unit policy   2. Educate and implement non-pharmacologic comfort measures (i.e. relaxation, distraction, massage, cold/heat therapy, etc.)   3. Pain management medications as ordered   4. Reassess pain after pain med administration per policy   5. If opiods administered assess patient's response to pain medication is appropriate per POSS sedation scale 6. Follow pain management plan developed in collaboration with patient and interdisciplinary team (including palliative care or pain specialists if applicable)    Outcome: Progressing  Note: Pain reports no pain. Continued pain level assessment and monitoring all throughout shift.    Problem: Skin Integrity  Goal: Skin integrity is maintained or improved    Description  Target End Date: Prior to discharge or change in level of care     Document interventions on Skin Risk/Kee flowsheet groups and corresponding LDA     1. Assess and monitor skin integrity, appearance  and/or temperature   2. Assess risk factors for impaired skin integrity and/or pressures ulcers   3. Implement precautions to protect skin integrity in collaboration with interdisciplinary team   4. Implement pressure ulcer prevention protocol if at risk for skin breakdown   5. Confirm wound care consult if at risk for skin breakdown 6. Ensure patient use of pressure relieving devices (Low air loss bed, waffle overlay, heel protectors, ROHO cushion, etc)    Outcome: Progressing  Note: Encouraged turning side to side. Extra pillow used for positional support and heel offloading. Barrier cream applied as needed. Placed patient on Purewick. Patient cleaned with gown and linens changed as appropriate.      Problem: Respiratory  Goal: Patient will achieve/maintain optimum respiratory ventilation and gas exchange    Description  Target End Date: Prior to discharge or change in level of care Document on Assessment flowsheet   1. Assess and monitor rate, rhythm, depth and effort of respiration   2. Breath sounds assessed qshift and/or as needed   3. Assess O2 saturation, administer/titrate oxygen as ordered   4. Position patient for maximum ventilatory efficiency   5. Turn, cough, and deep breath with splinting to improve effectiveness   6. Collaborate with RT to administer medication/treatments per order   7. Encourage use of incentive spirometer and encourage patient to cough after use and utilize splinting techniques if applicable   8. Airway suctioning   9. Monitor sputum production for changes in color, consistency and frequency   10. Perform frequent oral hygiene   11. Alternate physical activity with rest periods    Outcome: Progressing  Note: Encouraged deep breathing exercises. Encouraged turning side to side. Monitored O2 saturation and respiration. Monitored dressing at right intercostals (post CTT removal) - clean, dry and intact. Kept head of bed elevated above 30 degrees.

## 2023-07-17 NOTE — PROGRESS NOTES
No interval events  Remains medically cleared for discharge back to group home  Prescription sent to [facility] per group home request    Discharge summary complete

## 2023-07-21 RX ORDER — MULTIVITAMIN
1 TABLET ORAL DAILY
Qty: 30 TABLET | Refills: 11 | Status: SHIPPED | OUTPATIENT
Start: 2023-07-21

## 2023-07-21 NOTE — TELEPHONE ENCOUNTER
Received request via: Pharmacy    Was the patient seen in the last year in this department? Yes    Does the patient have an active prescription (recently filled or refills available) for medication(s) requested?  Requesting refill    Does the patient have snf Plus and need 100 day supply (blood pressure, diabetes and cholesterol meds only)? Patient does not have SCP

## 2023-08-04 ENCOUNTER — OFFICE VISIT (OUTPATIENT)
Dept: MEDICAL GROUP | Facility: CLINIC | Age: 43
End: 2023-08-04
Payer: MEDICARE

## 2023-08-04 VITALS — HEIGHT: 61 IN | BODY MASS INDEX: 40.46 KG/M2 | WEIGHT: 214.3 LBS

## 2023-08-04 DIAGNOSIS — S27.1XXD: ICD-10-CM

## 2023-08-04 PROCEDURE — 99213 OFFICE O/P EST LOW 20 MIN: CPT | Mod: GE | Performed by: STUDENT IN AN ORGANIZED HEALTH CARE EDUCATION/TRAINING PROGRAM

## 2023-08-04 RX ORDER — THIAMINE MONONITRATE (VIT B1) 100 MG
TABLET ORAL
COMMUNITY
Start: 2023-07-17 | End: 2023-08-04

## 2023-08-04 ASSESSMENT — FIBROSIS 4 INDEX: FIB4 SCORE: 0.36

## 2023-08-04 NOTE — PROGRESS NOTES
"Subjective:     CC: Hospital follow-up    HPI:   Erin presents today accompanied by caregiver for hospital follow-up.  History obtained from patient.  She states that since discharge from the hospital she has been doing well.  She denies any shortness of breath or chest pain.  She denies any further complications after hospitalization.  She reports overall doing well.  She has no specific complaints today.  She denies any further falls.    No problems updated.    Current Outpatient Medications Ordered in Epic   Medication Sig Dispense Refill    Multiple Vitamin (MULTIVITAMIN) Tab Take 1 Tablet by mouth every day. 30 Tablet 11    thiamine (VITAMIN B-1) 100 MG Tab Take 100 mg by mouth every day.      metFORMIN ER (GLUCOPHAGE XR) 500 MG TABLET SR 24 HR TAKE 1 TABLET BY MOUTH ONCE DAILY FOR PRE-DIABETES. (Patient taking differently: Take 500 mg by mouth every day.) 90 Tablet 3    Incontinence Supply Disposable (PREVAIL SUPER PLUS XLARGE) Misc WEAR DAILY, CHANGE AS NEEDED. 98 Each 3    lamoTRIgine (LAMICTAL) 25 MG Tab Take 1 Tab by mouth every day. 90 Tab 1    carbidopa-levodopa (SINEMET)  MG Tab Take 1 Tab by mouth 3 times a day. 270 Tab 1    levETIRAcetam (KEPPRA) 500 MG Tab Take 1 Tab by mouth 3 times a day. 270 Tab 1    ziprasidone (GEODON) 60 MG Cap Take 60 mg by mouth every evening.      thiamine (VITAMIN B-1) 100 MG Tab        No current Epic-ordered facility-administered medications on file.     ROS:  Review of Systems   All other systems reviewed and are negative.      Objective:     Exam:  BP (P) 98/62 (BP Location: Right arm, Patient Position: Sitting, BP Cuff Size: Large adult)   Pulse (!) (P) 107   Temp (P) 36.4 °C (97.6 °F) (Temporal)   Ht 1.549 m (5' 1\")   Wt 97.2 kg (214 lb 4.8 oz)   LMP  (LMP Unknown)   SpO2 (P) 93%   BMI 40.49 kg/m²  Body mass index is 40.49 kg/m².    Physical Exam  Vitals and nursing note reviewed.   Constitutional:       General: She is not in acute distress.     " Appearance: She is not ill-appearing or toxic-appearing.   HENT:      Head: Atraumatic.      Nose: Nose normal.      Mouth/Throat:      Mouth: Mucous membranes are moist.   Eyes:      Conjunctiva/sclera: Conjunctivae normal.   Cardiovascular:      Rate and Rhythm: Normal rate and regular rhythm.      Heart sounds: No murmur heard.  Pulmonary:      Effort: Pulmonary effort is normal. No respiratory distress.      Breath sounds: Normal breath sounds. No wheezing.   Abdominal:      General: There is no distension.   Musculoskeletal:         General: No deformity. Normal range of motion.      Cervical back: Normal range of motion and neck supple. No rigidity.   Skin:     Findings: No bruising.      Comments: Healing chest tube insertion site under right breast without surrounding erythema or drainage.   Neurological:      Mental Status: She is alert. Mental status is at baseline.   Psychiatric:         Mood and Affect: Mood normal.         Thought Content: Thought content normal.         Assessment & Plan:     43 y.o. female with the following -     #Hemothorax, subsequent encounter  Patient was recently hospitalized after a fall in the shower causing 4 broken ribs and a hemothorax.  She did have a chest tube placed in the hospital.  Since discharge from the hospital she has overall been doing well.  She denies any shortness of breath or chest pain.  On exam, lungs are clear to auscultation bilaterally.  There is a chest tube insertion site below her right breast that is healing without drainage or surrounding erythema.  She denies any complaints today.  Per chart review, documentation shows following up with Dr. Nieves as needed.  Patient will follow-up for annual/well exam.    Problem List Items Addressed This Visit    None  Visit Diagnoses       Hemothorax, traumatic, subsequent encounter                I spent a total of 40 minutes with record review, exam, communication with the patient, communication with other  providers, and documentation of this encounter.      Return for annual/wellness.      Paulette Gomez MD  [facility] PGY-3

## 2023-08-14 ENCOUNTER — APPOINTMENT (OUTPATIENT)
Dept: MEDICAL GROUP | Facility: CLINIC | Age: 43
End: 2023-08-14
Payer: MEDICARE

## 2023-08-21 RX ORDER — METFORMIN HYDROCHLORIDE 500 MG/1
500 TABLET, EXTENDED RELEASE ORAL DAILY
Qty: 90 TABLET | Refills: 0 | Status: SHIPPED | OUTPATIENT
Start: 2023-08-21 | End: 2023-11-13

## 2023-08-21 RX ORDER — THIAMINE MONONITRATE (VIT B1) 100 MG
TABLET ORAL DAILY
Qty: 30 TABLET | Refills: 0 | Status: SHIPPED | OUTPATIENT
Start: 2023-08-21 | End: 2023-09-20 | Stop reason: SDUPTHER

## 2023-08-30 ENCOUNTER — OFFICE VISIT (OUTPATIENT)
Dept: URGENT CARE | Facility: PHYSICIAN GROUP | Age: 43
End: 2023-08-30
Payer: MEDICARE

## 2023-08-30 ENCOUNTER — HOSPITAL ENCOUNTER (OUTPATIENT)
Dept: RADIOLOGY | Facility: MEDICAL CENTER | Age: 43
End: 2023-08-30
Payer: MEDICARE

## 2023-08-30 VITALS
OXYGEN SATURATION: 98 % | RESPIRATION RATE: 16 BRPM | HEIGHT: 61 IN | TEMPERATURE: 98.1 F | DIASTOLIC BLOOD PRESSURE: 80 MMHG | HEART RATE: 76 BPM | SYSTOLIC BLOOD PRESSURE: 102 MMHG | WEIGHT: 214 LBS | BODY MASS INDEX: 40.4 KG/M2

## 2023-08-30 DIAGNOSIS — M79.89 SWELLING OF RIGHT LOWER EXTREMITY: ICD-10-CM

## 2023-08-30 PROCEDURE — 73610 X-RAY EXAM OF ANKLE: CPT | Mod: RT

## 2023-08-30 PROCEDURE — 99213 OFFICE O/P EST LOW 20 MIN: CPT

## 2023-08-30 PROCEDURE — 3079F DIAST BP 80-89 MM HG: CPT

## 2023-08-30 PROCEDURE — 3074F SYST BP LT 130 MM HG: CPT

## 2023-08-30 ASSESSMENT — FIBROSIS 4 INDEX: FIB4 SCORE: 0.36

## 2023-08-30 NOTE — PROGRESS NOTES
Subjective:   Erin Santiago is a 43 y.o. female who presents for Leg Swelling (Rt Ankle x3d)      HPI:    Pleasant 43-year-old female with previous medical history of Parkinson's, developmental disability, epilepsy presents to urgent care with concerns of right ankle pain and swelling.  Patient is unaccompanied, lives in group home.  She denies falls, is unaware of how she injured the right lower extremity  Onset was 3 days ago.  She localizes pain to the medial aspect of the distal tibia.  Denies weakness, numbness/tingling sensation.  There is slight decreased range of motion in the right ankle with flexion.     ROS As above in HPI    Medications:    Current Outpatient Medications on File Prior to Visit   Medication Sig Dispense Refill    metFORMIN ER (GLUCOPHAGE XR) 500 MG TABLET SR 24 HR Take 1 Tablet by mouth every day. 90 Tablet 0    thiamine (VITAMIN B-1) 100 MG Tab TAKE 1 TABLET BY MOUTH ONCE DAILY. 30 Tablet 0    Multiple Vitamin (MULTIVITAMIN) Tab Take 1 Tablet by mouth every day. 30 Tablet 11    Incontinence Supply Disposable (PREVAIL SUPER PLUS XLARGE) Misc WEAR DAILY, CHANGE AS NEEDED. 98 Each 3    lamoTRIgine (LAMICTAL) 25 MG Tab Take 1 Tab by mouth every day. 90 Tab 1    carbidopa-levodopa (SINEMET)  MG Tab Take 1 Tab by mouth 3 times a day. 270 Tab 1    levETIRAcetam (KEPPRA) 500 MG Tab Take 1 Tab by mouth 3 times a day. 270 Tab 1    ziprasidone (GEODON) 60 MG Cap Take 60 mg by mouth every evening.       No current facility-administered medications on file prior to visit.        Allergies:   Acetaminophen, Other misc, Penicillins, and Shellfish allergy    Problem List:   Patient Active Problem List   Diagnosis    Other specified noninflammatory disorder of vulva and perineum    Partial epilepsy with impairment of consciousness, intractable (HCC)    Bipolar 1 disorder, depressed, moderate (HCC)    Parkinson disease (HCC)    Seizure (HCC)    Weight loss    Polycystic ovary    Cognitive  "decline    Metabolic encephalopathy    Stroke (cerebrum) ([facility])    Intellectual disability    Health care maintenance    Prediabetes    Traumatic hemothorax without open wound into thorax, initial encounter    Fracture four ribs-closed, right, initial encounter    No contraindication to deep vein thrombosis (DVT) prophylaxis    Trauma    Class 3 severe obesity in adult ([facility])    Lives in group home    Leukocytosis        Surgical History:  Past Surgical History:   Procedure Laterality Date    LESION EXCISION GENERAL  2/20/2014    Performed by Lemuel Fernandez M.D. at SURGERY SAME DAY [facility] ORS       Past Social Hx:   Social History     Tobacco Use    Smoking status: Former     Types: Cigarettes    Smokeless tobacco: Never   Vaping Use    Vaping Use: Never used   Substance Use Topics    Alcohol use: No    Drug use: No          Problem list, medications, and allergies reviewed by myself today in Epic.     Objective:     /80   Pulse 76   Temp 36.7 °C (98.1 °F) (Temporal)   Resp 16   Ht 1.549 m (5' 1\")   Wt 97.1 kg (214 lb)   SpO2 98%   BMI 40.43 kg/m²     Physical Exam  Vitals and nursing note reviewed.   Constitutional:       Appearance: Normal appearance.   HENT:      Head: Normocephalic.   Cardiovascular:      Rate and Rhythm: Normal rate and regular rhythm.      Heart sounds: Normal heart sounds.   Pulmonary:      Effort: Pulmonary effort is normal.      Breath sounds: Normal breath sounds.   Musculoskeletal:         General: Tenderness and signs of injury present. No deformity.      Right lower leg: Edema present.      Left lower leg: No edema.      Comments: Distal tibia malleolus are slightly tender to palpation.  No obvious deformity, dislocation.  No tenderness to right knee, hip.  Slight reduced range of motion with flexion.  Sensation is intact to light and sharp touch, cap refill less than 2 seconds, 2+ DP pulse.  1+ edema of right lower extremity, nonpitting.  No erythema, warmth, " fluctuance, ecchymosis.   Skin:     General: Skin is warm and dry.      Findings: No rash.   Neurological:      Mental Status: She is alert and oriented to person, place, and time.         Assessment/Plan:       Diagnosis and associated orders:   1. Swelling of right lower extremity  - DX-ANKLE 3+ VIEWS RIGHT; Future  - US-EXTREMITY VENOUS LOWER UNILAT RIGHT; Future        Comments/MDM:     Patient is somewhat of a poor historian.  Unable to recall history sufficiently.  X-rays ordered to rule out acute fractures or dislocations.  Ultrasound also ordered to rule out DVT.  Will modify plan once imaging results are available for review.   Patient provides form to fill out for updated medical care.  Copy of this note and test results will be attached to form.  Follow-up with primary care as advised.       Please note that this dictation was created using voice recognition software. I have made a reasonable attempt to correct obvious errors, but I expect that there are errors of grammar and possibly content that I did not discover before finalizing the note.    This note was electronically signed by Evelin Jones, ZACHERY, APRN, FNP-C

## 2023-09-18 ENCOUNTER — APPOINTMENT (OUTPATIENT)
Dept: MEDICAL GROUP | Facility: CLINIC | Age: 43
End: 2023-09-18
Payer: MEDICARE

## 2023-09-21 RX ORDER — THIAMINE MONONITRATE (VIT B1) 100 MG
100 TABLET ORAL DAILY
Qty: 90 TABLET | Refills: 0 | Status: SHIPPED | OUTPATIENT
Start: 2023-09-21 | End: 2023-11-20

## 2023-10-02 ENCOUNTER — APPOINTMENT (OUTPATIENT)
Dept: MEDICAL GROUP | Facility: CLINIC | Age: 43
End: 2023-10-02
Payer: MEDICARE

## 2023-10-03 ENCOUNTER — OFFICE VISIT (OUTPATIENT)
Dept: MEDICAL GROUP | Facility: CLINIC | Age: 43
End: 2023-10-03
Payer: MEDICARE

## 2023-10-03 VITALS
OXYGEN SATURATION: 94 % | WEIGHT: 210 LBS | SYSTOLIC BLOOD PRESSURE: 118 MMHG | TEMPERATURE: 97.6 F | DIASTOLIC BLOOD PRESSURE: 68 MMHG | HEART RATE: 84 BPM | HEIGHT: 61 IN | BODY MASS INDEX: 39.65 KG/M2

## 2023-10-03 DIAGNOSIS — Z00.00 WELL ADULT EXAM: ICD-10-CM

## 2023-10-03 DIAGNOSIS — Z13.9 SCREENING DUE: ICD-10-CM

## 2023-10-03 DIAGNOSIS — Z12.11 SCREENING FOR COLON CANCER: ICD-10-CM

## 2023-10-03 DIAGNOSIS — Z12.31 ENCOUNTER FOR SCREENING MAMMOGRAM FOR MALIGNANT NEOPLASM OF BREAST: ICD-10-CM

## 2023-10-03 DIAGNOSIS — E66.9 OBESITY (BMI 30-39.9): ICD-10-CM

## 2023-10-03 PROCEDURE — G0439 PPPS, SUBSEQ VISIT: HCPCS | Mod: GE

## 2023-10-03 PROCEDURE — 3074F SYST BP LT 130 MM HG: CPT

## 2023-10-03 PROCEDURE — 3078F DIAST BP <80 MM HG: CPT

## 2023-10-03 ASSESSMENT — ACTIVITIES OF DAILY LIVING (ADL): BATHING_REQUIRES_ASSISTANCE: 0

## 2023-10-03 ASSESSMENT — PATIENT HEALTH QUESTIONNAIRE - PHQ9: CLINICAL INTERPRETATION OF PHQ2 SCORE: 0

## 2023-10-03 ASSESSMENT — ENCOUNTER SYMPTOMS: GENERAL WELL-BEING: GOOD

## 2023-10-03 ASSESSMENT — FIBROSIS 4 INDEX: FIB4 SCORE: 0.36

## 2023-10-03 NOTE — PROGRESS NOTES
[facility]    Chief Complaint   Patient presents with    Paperwork       HPI:  Erin Santiago is a 43 y.o. here for Medicare Annual Wellness Visit     Patient Active Problem List    Diagnosis Date Noted    Leukocytosis 07/12/2023    Lives in group home 07/09/2023    Traumatic hemothorax without open wound into thorax, initial encounter 07/07/2023    Fracture four ribs-closed, right, initial encounter 07/07/2023    No contraindication to deep vein thrombosis (DVT) prophylaxis 07/07/2023    Trauma 07/07/2023    Class 3 severe obesity in adult ([facility]) 07/07/2023    Prediabetes 10/25/2022    Health care maintenance 09/30/2022    Intellectual disability 12/16/2021    Metabolic encephalopathy 03/06/2019    Stroke (cerebrum) (formerly [facility]) 03/06/2019    Parkinson disease 07/17/2018    Seizure (formerly [facility]) 07/17/2018    Weight loss 07/17/2018    Polycystic ovary 07/17/2018    Cognitive decline 07/17/2018    Partial epilepsy with impairment of consciousness, intractable ([facility]) 08/12/2014    Bipolar 1 disorder, depressed, moderate (formerly [facility]) 08/12/2014    Other specified noninflammatory disorder of vulva and perineum 02/20/2014       Current Outpatient Medications   Medication Sig Dispense Refill    thiamine (VITAMIN B-1) 100 MG Tab Take 1 Tablet by mouth every day. 90 Tablet 0    metFORMIN ER (GLUCOPHAGE XR) 500 MG TABLET SR 24 HR Take 1 Tablet by mouth every day. 90 Tablet 0    Multiple Vitamin (MULTIVITAMIN) Tab Take 1 Tablet by mouth every day. 30 Tablet 11    Incontinence Supply Disposable (PREVAIL SUPER PLUS XLARGE) Misc WEAR DAILY, CHANGE AS NEEDED. 98 Each 3    lamoTRIgine (LAMICTAL) 25 MG Tab Take 1 Tab by mouth every day. 90 Tab 1    carbidopa-levodopa (SINEMET)  MG Tab Take 1 Tab by mouth 3 times a day. 270 Tab 1    levETIRAcetam (KEPPRA) 500 MG Tab Take 1 Tab by mouth 3 times a day. 270 Tab 1    ziprasidone (GEODON) 60 MG Cap Take 60 mg by mouth every evening.       No current facility-administered medications for  this visit.          Current supplements as per medication list.     Allergies: Acetaminophen, Other misc, Penicillins, and Shellfish allergy    Current social contact/activities: Lives in group, working at [employer]     She  reports that she has quit smoking. Her smoking use included cigarettes. She has never used smokeless tobacco. She reports that she does not drink alcohol and does not use drugs.  Counseling given: No      ROS:    Gait: Uses no assistive device  Ostomy: No  Other tubes: No  Amputations: No  Chronic oxygen use: No  Last eye exam: 1 month ago  Wears hearing aids: No   : Denies any urinary leakage during the last 6 months    Screening:    Depression Screening  Little interest or pleasure in doing things?  0 - not at all  Feeling down, depressed , or hopeless? 0 - not at all    Screening for Cognitive Impairment  Do you or any of your friends or family members have any concern about your memory? Yes  Three Minute Recall (Banana, Sunrise, Chair) 3/3    [place] clock face with all 12 numbers and set the hands to show 20 past 8.  No    Cognitive concerns identified deferred for follow up unless specifically addressed in assessment and plan.    Fall Risk Assessment  Has the patient had two or more falls in the last year or any fall with injury in the last year?  No    Safety Assessment  Do you always wear your seatbelt?  Yes  Any changes to home needed to function safely? No  Difficulty hearing.  No  Patient counseled about all safety risks that were identified.    Functional Assessment ADLs  Are there any barriers preventing you from cooking for yourself or meeting nutritional needs?  No.    Are there any barriers preventing you from driving safely or obtaining transportation?  Yes.    Are there any barriers preventing you from using a telephone or calling for help?  No    Are there any barriers preventing you from shopping?  No.    Are there any barriers preventing you from taking care of your  own finances?  Yes    Are there any barriers preventing you from managing your medications?  Yes    Are there any barriers preventing you from showering, bathing or dressing yourself? No    Are there any barriers preventing you from doing housework or laundry? No  Are there any barriers preventing you from using the toilet?No  Are you currently engaging in any exercise or physical activity?  No.      Self-Assessment of Health  What is your perception of your health? Good  Do you sleep more than six hours a night? Yes  In the past 7 days, how much did pain keep you from doing your normal work? None  Do you spend quality time with family or friends (virtually or in person)? Yes  Do you usually eat a heart healthy diet that constists of a variety of fruits, vegetables, whole grains and fiber? Yes  Do you eat foods high in fat and/or Fast Food more than three times per week? Yes    Advance Care Planning  Do you have an Advance Directive, Living Will, Durable Power of , or POLST? Yes        POLST is not on file - instructed patient to bring in a copy to scan into their chart      Health Maintenance Summary            Overdue - Cervical Cancer Screening (Every 3 Years) Overdue - never done      No completion history exists for this topic.              Overdue - Annual Wellness Visit (Every 366 Days) Overdue - never done      No completion history exists for this topic.              Overdue - Hepatitis B Vaccine (Hep B) (1 of 3 - 3-dose series) Overdue - never done      No completion history exists for this topic.              Overdue - Hepatitis C Screening (Once) Overdue - never done      No completion history exists for this topic.              Ordered - Colorectal Cancer Screening (Colonoscopy - Every 10 Years) Ordered on 10/3/2023      No completion history exists for this topic.              Overdue - Influenza Vaccine (1) Overdue since 9/1/2023 12/22/2022  Imm Admin: Influenza Vaccine Quad Inj (Pf)     12/31/2019  Imm Admin: Influenza Vaccine Quad Inj (Pf)    12/31/2019  Imm Admin: Influenza, Unspecified - HISTORICAL DATA    10/03/2018  Imm Admin: Influenza Vaccine Quad Inj (Pf)    10/30/2014  Imm Admin: Influenza Seasonal Injectable - Historical Data    Only the first 5 history entries have been loaded, but more history exists.              IMM DTaP/Tdap/Td Vaccine (7 - Td or Tdap) Next due on 9/13/2028 09/13/2018  Imm Admin: Tdap Vaccine    04/15/1983  Imm Admin: DTP - Historical vaccine    02/19/1980  Imm Admin: DTP - Historical vaccine    01/02/1979  Imm Admin: DTP - Historical vaccine    1978  Imm Admin: DTP - Historical vaccine    Only the first 5 history entries have been loaded, but more history exists.              Polio Vaccine (Inactivated Polio) (Series Information) Completed      04/15/1983  Imm Admin: OPV TRIVALENT - HISTORICAL DATA (GIVEN PRIOR TO MAY 2016)    02/19/1980  Imm Admin: OPV TRIVALENT - HISTORICAL DATA (GIVEN PRIOR TO MAY 2016)    01/02/1979  Imm Admin: OPV TRIVALENT - HISTORICAL DATA (GIVEN PRIOR TO MAY 2016)    1978  Imm Admin: OPV TRIVALENT - HISTORICAL DATA (GIVEN PRIOR TO MAY 2016)    1978  Imm Admin: OPV TRIVALENT - HISTORICAL DATA (GIVEN PRIOR TO MAY 2016)              COVID-19 Vaccine (Series Information) Completed      01/12/2023  Imm Admin: MODERNA BIVALENT BOOSTER SARS-COV-2 VACCINE (6+)    11/01/2021  Imm Admin: MODERNA SARS-COV-2 VACCINE (12+)    03/19/2021  Imm Admin: MODERNA SARS-COV-2 VACCINE (12+)    02/19/2021  Imm Admin: MODERNA SARS-COV-2 VACCINE (12+)              Hepatitis A Vaccine (Hep A) (Series Information) Aged Out      No completion history exists for this topic.              HPV Vaccines (Series Information) Aged Out      No completion history exists for this topic.              Meningococcal Immunization (Series Information) Aged Out      No completion history exists for this topic.              Pneumococcal Vaccine: 0-64 Years (Series  "Information) Aged Out      No completion history exists for this topic.                    Patient Care Team:  Castro Noel M.D. as PCP - General (Family Medicine)      Social History     Tobacco Use    Smoking status: Former     Types: Cigarettes    Smokeless tobacco: Never   Vaping Use    Vaping Use: Never used   Substance Use Topics    Alcohol use: No    Drug use: No     No family history on file.  She  has a past medical history of Anxiety, Depression, Diabetes, Epilepsy (HCC), Mental disability, Sleep apnea, Snoring, and Unspecified urinary incontinence.   Past Surgical History:   Procedure Laterality Date    LESION EXCISION GENERAL  2/20/2014    Performed by Lemuel Fernandez M.D. at SURGERY SAME DAY [facility] ORS       Exam:   /68 (BP Location: Left arm, Patient Position: Sitting, BP Cuff Size: Adult)   Pulse 84   Temp 36.4 °C (97.6 °F) (Temporal)   Ht 1.549 m (5' 1\")   Wt 95.3 kg (210 lb)   SpO2 94%  Body mass index is 39.68 kg/m².    Hearing excellent.    Dentition fair  Alert, oriented in no acute distress.  Eye contact is good, speech goal directed, affect calm    Assessment and Plan. The following treatment and monitoring plan is recommended:    1. Well adult exam  - COLOGUARD (FIT DNA)  - Lipid Profile; Future  - TSH WITH REFLEX TO FT4; Future  - MA-SCREENING MAMMO BILAT W/TOMOSYNTHESIS W/O CAD; Future    2. Screening for colon cancer  - COLOGUARD (FIT DNA)    3. Obesity (BMI 30-39.9)  - Lipid Profile; Future  - TSH WITH REFLEX TO FT4; Future    4. Encounter for screening mammogram for malignant neoplasm of breast  - MA-SCREENING MAMMO BILAT W/TOMOSYNTHESIS W/O CAD; Future    5. Counseled on cervical cancer screening and patient states she is not interested, has not been able to tolerate in the past and does not want to try again.     Services suggested: No services needed at this time  Health Care Screening: Age-appropriate preventive services recommended by USPTF and ACIP covered by " Medicare were discussed today. Services ordered if indicated and agreed upon by the patient.  Referrals offered: Community-based lifestyle interventions to reduce health risks and promote self-management and wellness, fall prevention, nutrition, physical activity, tobacco-use cessation, weight loss, and mental health services as per orders if indicated.    Discussion today about general wellness and lifestyle habits:    Prevent falls and reduce trip hazards; Cautioned about securing or removing rugs.  Have a working fire alarm and carbon monoxide detector;   Engage in regular physical activity and social activities     Follow-up: Return in about 1 year (around 10/3/2024).

## 2023-10-09 ENCOUNTER — APPOINTMENT (OUTPATIENT)
Dept: RADIOLOGY | Facility: MEDICAL CENTER | Age: 43
End: 2023-10-09
Payer: MEDICARE

## 2023-10-17 ENCOUNTER — HOSPITAL ENCOUNTER (OUTPATIENT)
Dept: RADIOLOGY | Facility: MEDICAL CENTER | Age: 43
End: 2023-10-17
Attending: STUDENT IN AN ORGANIZED HEALTH CARE EDUCATION/TRAINING PROGRAM
Payer: MEDICARE

## 2023-10-17 DIAGNOSIS — Z12.31 ENCOUNTER FOR SCREENING MAMMOGRAM FOR MALIGNANT NEOPLASM OF BREAST: ICD-10-CM

## 2023-10-17 PROCEDURE — 77063 BREAST TOMOSYNTHESIS BI: CPT

## 2023-11-13 RX ORDER — METFORMIN HYDROCHLORIDE 500 MG/1
TABLET, EXTENDED RELEASE ORAL
Qty: 30 TABLET | Refills: 0 | Status: SHIPPED | OUTPATIENT
Start: 2023-11-13 | End: 2023-12-13

## 2023-11-14 ENCOUNTER — HOSPITAL ENCOUNTER (OUTPATIENT)
Dept: LAB | Facility: MEDICAL CENTER | Age: 43
End: 2023-11-14
Payer: MEDICARE

## 2023-11-14 DIAGNOSIS — E66.9 OBESITY (BMI 30-39.9): ICD-10-CM

## 2023-11-14 DIAGNOSIS — Z00.00 WELL ADULT EXAM: ICD-10-CM

## 2023-11-14 DIAGNOSIS — Z13.9 SCREENING DUE: ICD-10-CM

## 2023-11-14 LAB
CHOLEST SERPL-MCNC: 139 MG/DL (ref 100–199)
FASTING STATUS PATIENT QL REPORTED: NORMAL
HCV AB SER QL: NORMAL
HDLC SERPL-MCNC: 61 MG/DL
HIV 1+2 AB+HIV1 P24 AG SERPL QL IA: NORMAL
LDLC SERPL CALC-MCNC: 67 MG/DL
TRIGL SERPL-MCNC: 53 MG/DL (ref 0–149)
TSH SERPL DL<=0.005 MIU/L-ACNC: 0.54 UIU/ML (ref 0.38–5.33)

## 2023-11-14 PROCEDURE — 36415 COLL VENOUS BLD VENIPUNCTURE: CPT | Mod: GA

## 2023-11-14 PROCEDURE — 86803 HEPATITIS C AB TEST: CPT

## 2023-11-14 PROCEDURE — G0475 HIV COMBINATION ASSAY: HCPCS | Mod: GA

## 2023-11-14 PROCEDURE — 84443 ASSAY THYROID STIM HORMONE: CPT | Mod: GA

## 2023-11-14 PROCEDURE — 80061 LIPID PANEL: CPT

## 2023-11-20 RX ORDER — THIAMINE MONONITRATE (VIT B1) 100 MG
TABLET ORAL DAILY
Qty: 30 TABLET | Refills: 0 | Status: SHIPPED | OUTPATIENT
Start: 2023-11-20

## 2023-12-13 RX ORDER — METFORMIN HYDROCHLORIDE 500 MG/1
TABLET, EXTENDED RELEASE ORAL
Qty: 30 TABLET | Refills: 0 | Status: SHIPPED | OUTPATIENT
Start: 2023-12-13 | End: 2024-01-12

## 2024-01-12 RX ORDER — METFORMIN HYDROCHLORIDE 500 MG/1
TABLET, EXTENDED RELEASE ORAL
Qty: 30 TABLET | Refills: 0 | Status: SHIPPED | OUTPATIENT
Start: 2024-01-12 | End: 2024-02-17

## 2024-01-12 NOTE — TELEPHONE ENCOUNTER
Received request via: Pharmacy    Was the patient seen in the last year in this department? Yes    Does the patient have an active prescription (recently filled or refills available) for medication(s) requested? No    Does the patient have assisted Plus and need 100 day supply (blood pressure, diabetes and cholesterol meds only)?  Patient does not have SCP

## 2024-02-09 NOTE — TELEPHONE ENCOUNTER
Received request via: Pharmacy    Was the patient seen in the last year in this department? Yes    Does the patient have an active prescription (recently filled or refills available) for medication(s) requested? No    Pharmacy Name: [facility]    Does the patient have FDC Plus and need 100 day supply (blood pressure, diabetes and cholesterol meds only)? Patient does not have SCP

## 2024-02-17 RX ORDER — METFORMIN HYDROCHLORIDE 500 MG/1
TABLET, EXTENDED RELEASE ORAL
Qty: 30 TABLET | Refills: 0 | Status: SHIPPED | OUTPATIENT
Start: 2024-02-17 | End: 2024-03-08

## 2024-03-05 ENCOUNTER — APPOINTMENT (OUTPATIENT)
Dept: MEDICAL GROUP | Facility: CLINIC | Age: 44
End: 2024-03-05
Payer: MEDICARE

## 2024-03-07 NOTE — TELEPHONE ENCOUNTER
Received request via: Pharmacy    Was the patient seen in the last year in this department? Yes    Does the patient have an active prescription (recently filled or refills available) for medication(s) requested? No    Pharmacy Name: [facility]    Does the patient have halfway Plus and need 100 day supply (blood pressure, diabetes and cholesterol meds only)? Patient does not have SCP

## 2024-03-08 RX ORDER — METFORMIN HYDROCHLORIDE 500 MG/1
TABLET, EXTENDED RELEASE ORAL
Qty: 30 TABLET | Refills: 0 | Status: SHIPPED | OUTPATIENT
Start: 2024-03-08

## 2024-03-13 ENCOUNTER — OFFICE VISIT (OUTPATIENT)
Dept: MEDICAL GROUP | Facility: CLINIC | Age: 44
End: 2024-03-13
Payer: MEDICARE

## 2024-03-13 VITALS
SYSTOLIC BLOOD PRESSURE: 138 MMHG | TEMPERATURE: 98.6 F | WEIGHT: 200.9 LBS | OXYGEN SATURATION: 95 % | DIASTOLIC BLOOD PRESSURE: 93 MMHG | HEART RATE: 85 BPM | BODY MASS INDEX: 37.93 KG/M2 | HEIGHT: 61 IN

## 2024-03-13 DIAGNOSIS — Z02.5 SPORTS PHYSICAL: ICD-10-CM

## 2024-03-13 PROCEDURE — 3075F SYST BP GE 130 - 139MM HG: CPT | Mod: GC

## 2024-03-13 PROCEDURE — 3080F DIAST BP >= 90 MM HG: CPT | Mod: GC

## 2024-03-13 PROCEDURE — 99213 OFFICE O/P EST LOW 20 MIN: CPT | Mod: GE

## 2024-03-13 ASSESSMENT — FIBROSIS 4 INDEX: FIB4 SCORE: 0.36

## 2024-03-13 NOTE — ASSESSMENT & PLAN NOTE
Physical exam form for [employer] was filled out.  On chart patient needed both vision and hearing performed.  Here in clinic hearing was not passed and vision was slightly impaired.  Patient has significant cerumen impaction and this may be leading to hearing loss.  Also advised caregiver to have patient be seen by an optometrist regarding her vision. Patient is cleared to bowl.

## 2024-03-13 NOTE — PROGRESS NOTES
"Subjective:     CC: sports physical     HPI:   Erin presents today for a sports physical for [employer].  Patient is accompanied by employee from group [employer].  Patient has history of intellectual disability, Parkinson's, bipolar.  Patient participates in [employer] for bowling.  Employee reports that patient heavily enjoys bowling and is very active in the community.  Employee has no concerns about patient's behaviors or patient requiring assistance to be able to participate in bowling.      Current Outpatient Medications Ordered in Epic   Medication Sig Dispense Refill    metFORMIN ER (GLUCOPHAGE XR) 500 MG TABLET SR 24 HR TAKE 1 TABLET BY MOUTH ONCE DAILY FOR PRE-DIABETES. 30 Tablet 0    thiamine (VITAMIN B-1) 100 MG Tab TAKE 1 TABLET BY MOUTH ONCE DAILY. 30 Tablet 0    Multiple Vitamin (MULTIVITAMIN) Tab Take 1 Tablet by mouth every day. 30 Tablet 11    Incontinence Supply Disposable (PREVAIL SUPER PLUS XLARGE) Misc WEAR DAILY, CHANGE AS NEEDED. 98 Each 3    lamoTRIgine (LAMICTAL) 25 MG Tab Take 1 Tab by mouth every day. 90 Tab 1    carbidopa-levodopa (SINEMET)  MG Tab Take 1 Tab by mouth 3 times a day. 270 Tab 1    levETIRAcetam (KEPPRA) 500 MG Tab Take 1 Tab by mouth 3 times a day. 270 Tab 1    ziprasidone (GEODON) 60 MG Cap Take 60 mg by mouth every evening.       No current Epic-ordered facility-administered medications on file.       ROS:  Gen: no fevers/chills, no changes in weight  Pulm: no sob, no cough  CV: no chest pain, no palpitations  GI: no nausea/vomiting, no diarrhea    Objective:     Exam:  BP (!) 138/93 (BP Location: Right arm, Patient Position: Sitting, BP Cuff Size: Adult)   Pulse 85   Temp 37 °C (98.6 °F) (Temporal)   Ht 1.549 m (5' 1\")   Wt 91.1 kg (200 lb 14.4 oz)   SpO2 95%   BMI 37.96 kg/m²  Body mass index is 37.96 kg/m².    Gen: Alert and oriented, No apparent distress.  HEENT: Bilateral TMs with cerumen impaction, dental carries present, pharynx clear, nares " clear, Neck is supple without lymphadenopathy.  Lungs: Normal effort, CTA bilaterally, no wheezes, rhonchi, or rales  CV: Regular rate and rhythm. No murmurs, rubs, or gallops.  Abdomen: obese, soft, non tender   Ext: No clubbing, cyanosis, edema.  Neuro: normal gait, CN II-XII intact, mild coordination impairment, 5/5 strength and sensation of upper and lower extremities   Psych: Normal affect and mood, answers questions appropriately     Assessment & Plan:     43 y.o. female with the following -     Sports physical  Physical exam form for [employer] was filled out.  On chart patient needed both vision and hearing performed.  Here in clinic hearing was not passed and vision was slightly impaired.  Patient has significant cerumen impaction and this may be leading to hearing loss.  Also advised caregiver to have patient be seen by an optometrist regarding her vision. Patient is cleared to bowl.      Follow up for annual exam including repeat Hba1c     Nan Kirkland M.D.  PGY-2

## 2024-04-10 NOTE — TELEPHONE ENCOUNTER
Received request via: Pharmacy    Was the patient seen in the last year in this department? Yes    Does the patient have an active prescription (recently filled or refills available) for medication(s) requested? No    [facility] Name[facility]

## 2024-04-12 RX ORDER — GAUZE BANDAGE 2" X 2"
100 BANDAGE TOPICAL DAILY
Qty: 30 TABLET | Refills: 0 | Status: SHIPPED | OUTPATIENT
Start: 2024-04-12

## 2024-04-12 RX ORDER — METFORMIN HYDROCHLORIDE 500 MG/1
TABLET, EXTENDED RELEASE ORAL
Qty: 30 TABLET | Refills: 0 | Status: SHIPPED | OUTPATIENT
Start: 2024-04-12

## 2024-05-13 RX ORDER — GAUZE BANDAGE 2" X 2"
100 BANDAGE TOPICAL DAILY
Qty: 30 TABLET | Refills: 0 | OUTPATIENT
Start: 2024-05-13

## 2024-05-13 RX ORDER — GAUZE BANDAGE 2" X 2"
100 BANDAGE TOPICAL DAILY
Qty: 30 TABLET | Refills: 3 | Status: SHIPPED | OUTPATIENT
Start: 2024-05-13

## 2024-05-13 RX ORDER — METFORMIN HYDROCHLORIDE 500 MG/1
TABLET, EXTENDED RELEASE ORAL
Qty: 30 TABLET | Refills: 0 | OUTPATIENT
Start: 2024-05-13

## 2024-05-13 RX ORDER — METFORMIN HYDROCHLORIDE 500 MG/1
TABLET, EXTENDED RELEASE ORAL
Qty: 30 TABLET | Refills: 3 | Status: SHIPPED | OUTPATIENT
Start: 2024-05-13

## 2024-05-13 NOTE — TELEPHONE ENCOUNTER
Received request via: Pharmacy    Was the patient seen in the last year in this department? Yes    Does the patient have an active prescription (recently filled or refills available) for medication(s) requested? No    [facility]    Does the patient have FCI Plus and need 100 day supply (blood pressure, diabetes and cholesterol meds only)? Patient does not have SCP

## 2024-06-13 NOTE — TELEPHONE ENCOUNTER
Received request via: Pharmacy    Was the patient seen in the last year in this department? Yes    Does the patient have an active prescription (recently filled or refills available) for medication(s) requested? No    [facility] Name[facility]    Does the patient have half-way Plus and need 100 day supply (blood pressure, diabetes and cholesterol meds only)? Patient does not have SCP

## 2024-08-01 NOTE — TELEPHONE ENCOUNTER
Received request via: Pharmacy    Was the patient seen in the last year in this department? Yes    Does the patient have an active prescription (recently filled or refills available) for medication(s) requested? No    [facility] Name: [facility]    Does the patient have long term Plus and need 100 day supply (blood pressure, diabetes and cholesterol meds only)? Patient does not have SCP

## 2024-08-05 RX ORDER — METFORMIN HCL 500 MG
TABLET, EXTENDED RELEASE 24 HR ORAL
Qty: 30 TABLET | Refills: 0 | Status: ON HOLD | OUTPATIENT
Start: 2024-08-05

## 2024-08-05 RX ORDER — THIAMINE MONONITRATE (VIT B1) 100 MG
100 TABLET ORAL DAILY
Qty: 30 TABLET | Refills: 0 | Status: ON HOLD | OUTPATIENT
Start: 2024-08-05

## 2024-08-25 ENCOUNTER — HOSPITAL ENCOUNTER (EMERGENCY)
Facility: MEDICAL CENTER | Age: 44
End: 2024-08-25
Attending: EMERGENCY MEDICINE
Payer: MEDICARE

## 2024-08-25 ENCOUNTER — PHARMACY VISIT (OUTPATIENT)
Dept: PHARMACY | Facility: MEDICAL CENTER | Age: 44
End: 2024-08-25
Payer: COMMERCIAL

## 2024-08-25 VITALS
RESPIRATION RATE: 16 BRPM | BODY MASS INDEX: 35.88 KG/M2 | DIASTOLIC BLOOD PRESSURE: 78 MMHG | TEMPERATURE: 98.2 F | OXYGEN SATURATION: 98 % | SYSTOLIC BLOOD PRESSURE: 122 MMHG | HEART RATE: 74 BPM | HEIGHT: 61 IN | WEIGHT: 190.04 LBS

## 2024-08-25 DIAGNOSIS — B86 SCABIES: ICD-10-CM

## 2024-08-25 PROCEDURE — 99282 EMERGENCY DEPT VISIT SF MDM: CPT

## 2024-08-25 PROCEDURE — RXMED WILLOW AMBULATORY MEDICATION CHARGE: Performed by: EMERGENCY MEDICINE

## 2024-08-25 RX ORDER — CETIRIZINE HYDROCHLORIDE 10 MG/1
10 TABLET ORAL DAILY
Qty: 30 TABLET | Refills: 0 | Status: SHIPPED | OUTPATIENT
Start: 2024-08-25

## 2024-08-25 RX ORDER — PERMETHRIN 50 MG/G
CREAM TOPICAL
Qty: 60 G | Refills: 0 | Status: SHIPPED | OUTPATIENT
Start: 2024-08-25

## 2024-08-25 ASSESSMENT — FIBROSIS 4 INDEX: FIB4 SCORE: 0.37

## 2024-08-26 NOTE — ED NOTES
Pt discharged to home. Discharge paperwork provided. Education provided by ERP. Reinforced discharge instructions.  Pt and caregiver was given follow up instructions and prescriptions.  Pt/caregiver verbalized understanding of all instructions for discharge.   Patient went out of the ER ambulatory with caregiver  steady gait., alert and oriented x 4, with all belongings.

## 2024-08-26 NOTE — ED PROVIDER NOTES
"ED Provider Note    Primary care provider: Castro Noel M.D.    CHIEF COMPLAINT  Chief Complaint   Patient presents with    Rash     Full body rash with wounds x 2 weeks      Additional history: From caretaker at the group home.  They deny any other resident illness.  History limited due to developmental delay.    HPI  Erin Santiago is a 44 y.o. female who presents to the Emergency Department a widespread pruritic rash beginning about 2 weeks ago.  No other residents have similar rashes.  The patient is not sure if she is ever had this recently.  Denies any new lotions, detergents, living situation, pets.      External Record Review: Most recent note was the family medicine clinic in March for sports physical for the [employer].    REVIEW OF SYSTEMS  See HPI.     PAST MEDICAL HISTORY   has a past medical history of Anxiety, Depression, Diabetes, Epilepsy (HCC), Mental disability, Sleep apnea, Snoring, and Unspecified urinary incontinence.    SURGICAL HISTORY   has a past surgical history that includes lesion excision general (2/20/2014).    SOCIAL HISTORY  Social History     Tobacco Use    Smoking status: Former     Types: Cigarettes    Smokeless tobacco: Never   Vaping Use    Vaping status: Never Used   Substance Use Topics    Alcohol use: No    Drug use: No      Social History     Substance and Sexual Activity   Drug Use No       FAMILY HISTORY  No family history on file.    CURRENT MEDICATIONS  Reviewed.  See Encounter Summary.     ALLERGIES  Allergies   Allergen Reactions    Acetaminophen      Unknown per patient and caregiver    Other Misc      bees    Penicillins Hives     Per patien    Shellfish Allergy Hives     Get hives per patient       PHYSICAL EXAM  VITAL SIGNS: /83   Pulse 76   Temp 36.6 °C (97.8 °F) (Temporal)   Resp 16   Ht 1.549 m (5' 1\")   Wt 86.2 kg (190 lb 0.6 oz)   SpO2 99%   BMI 35.91 kg/m²   Constitutional: Awake, alert in no apparent distress.  HENT: " Please read message below Normocephalic, Bilateral external ears normal. Nose normal.   Eyes: Conjunctiva normal, non-icteric, EOMI.    Thorax & Lungs: Easy unlabored respirations, Clear to ascultation bilaterally.  Cardiovascular: Regular rate, Regular rhythm, No murmurs, rubs or gallops. Bilateral pulses symmetrical.   Abdomen:  Soft, nontender, nondistended, normal active bowel sounds.   :    Skin: Widespread scabs, some in a linear distribution noted on the lower abdomen, bilateral upper extremities, lower extremities, bilateral flanks, none noted on the hands, few scattered on the face.  Musculoskeletal:   No cyanosis, clubbing or edema. No leg asymmetry.   Neurologic: Alert, Grossly non-focal.   Psychiatric: Normal affect, Normal mood  Lymphatic:          COURSE & MEDICAL DECISION MAKING  Pertinent Labs & Imaging studies reviewed. (See chart for details)    COURSE & MEDICAL DECISION MAKING  Pertinent Labs & Imaging studies reviewed. (See chart for details)    Differential diagnoses include but are not limited to: Certainly suspicious for scabies    7:51 PM - Nursing notes reviewed, patient seen and examined at bedside.        Escalation of care considered, and ultimately not performed: Laboratory analysis and diagnostic imaging.      Decision Making:  This is a pleasant 44 y.o. year old female who presents with widespread pruritic rash, the patient has small scabs diffusely on the body, they certainly have the resemblance of scabies, the patient lives in a group home which does put her at increased risk.  Apparently no other residents are symptomatic at this time.  Recommend permethrin treatment now, repeat in 1 week, antihistamines for any itching.  Avoid contact with of the residents.       The patient was discharged home (see d/c instructions) was told to return immediately for any signs or symptoms listed, or any worsening at all.  The patient verbally agreed to the discharge precautions and follow-up plan which is documented in  EPIC.    Discharge Medications:  New Prescriptions    CETIRIZINE (ZYRTEC) 10 MG TAB    Take 1 Tablet by mouth every day.    PERMETHRIN (ELIMITE) 5 % CREAM    Apply to affected areas topically from head to toe, leave on overnight, and wash off in the morning.  Repeat in 1 week       FINAL IMPRESSION  1. Scabies

## 2024-08-27 ENCOUNTER — APPOINTMENT (OUTPATIENT)
Dept: RADIOLOGY | Facility: MEDICAL CENTER | Age: 44
DRG: 871 | End: 2024-08-27
Attending: EMERGENCY MEDICINE
Payer: MEDICARE

## 2024-08-27 ENCOUNTER — HOSPITAL ENCOUNTER (INPATIENT)
Facility: MEDICAL CENTER | Age: 44
End: 2024-08-27
Attending: EMERGENCY MEDICINE | Admitting: FAMILY MEDICINE
Payer: MEDICARE

## 2024-08-27 DIAGNOSIS — K83.09 CHOLANGITIS: ICD-10-CM

## 2024-08-27 DIAGNOSIS — K80.50 CHOLEDOCHOLITHIASIS: ICD-10-CM

## 2024-08-27 PROBLEM — A41.9 SEPSIS (HCC): Status: ACTIVE | Noted: 2024-08-27

## 2024-08-27 PROBLEM — R74.01 TRANSAMINITIS: Status: ACTIVE | Noted: 2024-08-27

## 2024-08-27 PROBLEM — R10.9 ABDOMINAL PAIN: Status: ACTIVE | Noted: 2024-08-27

## 2024-08-27 LAB
ALBUMIN SERPL BCP-MCNC: 3.7 G/DL (ref 3.2–4.9)
ALBUMIN/GLOB SERPL: 1.2 G/DL
ALP SERPL-CCNC: 525 U/L (ref 30–99)
ALT SERPL-CCNC: 206 U/L (ref 2–50)
ANION GAP SERPL CALC-SCNC: 14 MMOL/L (ref 7–16)
AST SERPL-CCNC: 129 U/L (ref 12–45)
BACTERIA BLD CULT: NORMAL
BACTERIA BLD CULT: NORMAL
BASOPHILS # BLD AUTO: 0.3 % (ref 0–1.8)
BASOPHILS # BLD: 0.06 K/UL (ref 0–0.12)
BILIRUB SERPL-MCNC: 12.2 MG/DL (ref 0.1–1.5)
BUN SERPL-MCNC: 11 MG/DL (ref 8–22)
CALCIUM ALBUM COR SERPL-MCNC: 9.5 MG/DL (ref 8.5–10.5)
CALCIUM SERPL-MCNC: 9.3 MG/DL (ref 8.5–10.5)
CHLORIDE SERPL-SCNC: 102 MMOL/L (ref 96–112)
CO2 SERPL-SCNC: 23 MMOL/L (ref 20–33)
CREAT SERPL-MCNC: 0.45 MG/DL (ref 0.5–1.4)
EKG IMPRESSION: NORMAL
EKG IMPRESSION: NORMAL
EOSINOPHIL # BLD AUTO: 0.03 K/UL (ref 0–0.51)
EOSINOPHIL NFR BLD: 0.1 % (ref 0–6.9)
ERYTHROCYTE [DISTWIDTH] IN BLOOD BY AUTOMATED COUNT: 51.1 FL (ref 35.9–50)
GFR SERPLBLD CREATININE-BSD FMLA CKD-EPI: 120 ML/MIN/1.73 M 2
GLOBULIN SER CALC-MCNC: 3.2 G/DL (ref 1.9–3.5)
GLUCOSE SERPL-MCNC: 132 MG/DL (ref 65–99)
HCG SERPL QL: NEGATIVE
HCT VFR BLD AUTO: 43.5 % (ref 37–47)
HGB BLD-MCNC: 14.6 G/DL (ref 12–16)
IMM GRANULOCYTES # BLD AUTO: 0.14 K/UL (ref 0–0.11)
IMM GRANULOCYTES NFR BLD AUTO: 0.6 % (ref 0–0.9)
LACTATE SERPL-SCNC: 1.5 MMOL/L (ref 0.5–2)
LIPASE SERPL-CCNC: 1016 U/L (ref 11–82)
LYMPHOCYTES # BLD AUTO: 1.21 K/UL (ref 1–4.8)
LYMPHOCYTES NFR BLD: 5.6 % (ref 22–41)
MCH RBC QN AUTO: 31.1 PG (ref 27–33)
MCHC RBC AUTO-ENTMCNC: 33.6 G/DL (ref 32.2–35.5)
MCV RBC AUTO: 92.6 FL (ref 81.4–97.8)
MONOCYTES # BLD AUTO: 1.68 K/UL (ref 0–0.85)
MONOCYTES NFR BLD AUTO: 7.8 % (ref 0–13.4)
NEUTROPHILS # BLD AUTO: 18.5 K/UL (ref 1.82–7.42)
NEUTROPHILS NFR BLD: 85.6 % (ref 44–72)
NRBC # BLD AUTO: 0 K/UL
NRBC BLD-RTO: 0 /100 WBC (ref 0–0.2)
PLATELET # BLD AUTO: 448 K/UL (ref 164–446)
PMV BLD AUTO: 10.2 FL (ref 9–12.9)
POTASSIUM SERPL-SCNC: 3.8 MMOL/L (ref 3.6–5.5)
PROT SERPL-MCNC: 6.9 G/DL (ref 6–8.2)
RBC # BLD AUTO: 4.7 M/UL (ref 4.2–5.4)
SIGNIFICANT IND 70042: NORMAL
SIGNIFICANT IND 70042: NORMAL
SITE SITE: NORMAL
SITE SITE: NORMAL
SODIUM SERPL-SCNC: 139 MMOL/L (ref 135–145)
SOURCE SOURCE: NORMAL
SOURCE SOURCE: NORMAL
TROPONIN T SERPL-MCNC: <6 NG/L (ref 6–19)
WBC # BLD AUTO: 21.6 K/UL (ref 4.8–10.8)

## 2024-08-27 PROCEDURE — 84703 CHORIONIC GONADOTROPIN ASSAY: CPT

## 2024-08-27 PROCEDURE — A9270 NON-COVERED ITEM OR SERVICE: HCPCS

## 2024-08-27 PROCEDURE — 96365 THER/PROPH/DIAG IV INF INIT: CPT

## 2024-08-27 PROCEDURE — 80053 COMPREHEN METABOLIC PANEL: CPT

## 2024-08-27 PROCEDURE — 87040 BLOOD CULTURE FOR BACTERIA: CPT | Mod: 91

## 2024-08-27 PROCEDURE — 99285 EMERGENCY DEPT VISIT HI MDM: CPT

## 2024-08-27 PROCEDURE — 83690 ASSAY OF LIPASE: CPT

## 2024-08-27 PROCEDURE — 700102 HCHG RX REV CODE 250 W/ 637 OVERRIDE(OP)

## 2024-08-27 PROCEDURE — 93005 ELECTROCARDIOGRAM TRACING: CPT | Performed by: EMERGENCY MEDICINE

## 2024-08-27 PROCEDURE — 99222 1ST HOSP IP/OBS MODERATE 55: CPT | Mod: AI,GC | Performed by: FAMILY MEDICINE

## 2024-08-27 PROCEDURE — 36415 COLL VENOUS BLD VENIPUNCTURE: CPT

## 2024-08-27 PROCEDURE — 76705 ECHO EXAM OF ABDOMEN: CPT

## 2024-08-27 PROCEDURE — 93005 ELECTROCARDIOGRAM TRACING: CPT

## 2024-08-27 PROCEDURE — 96375 TX/PRO/DX INJ NEW DRUG ADDON: CPT

## 2024-08-27 PROCEDURE — 700105 HCHG RX REV CODE 258: Mod: UD | Performed by: EMERGENCY MEDICINE

## 2024-08-27 PROCEDURE — 700111 HCHG RX REV CODE 636 W/ 250 OVERRIDE (IP): Mod: JZ | Performed by: EMERGENCY MEDICINE

## 2024-08-27 PROCEDURE — 83605 ASSAY OF LACTIC ACID: CPT

## 2024-08-27 PROCEDURE — 71045 X-RAY EXAM CHEST 1 VIEW: CPT

## 2024-08-27 PROCEDURE — 770020 HCHG ROOM/CARE - TELE (206)

## 2024-08-27 PROCEDURE — 84484 ASSAY OF TROPONIN QUANT: CPT

## 2024-08-27 PROCEDURE — 85025 COMPLETE CBC W/AUTO DIFF WBC: CPT

## 2024-08-27 PROCEDURE — 700111 HCHG RX REV CODE 636 W/ 250 OVERRIDE (IP): Mod: JZ

## 2024-08-27 PROCEDURE — 700105 HCHG RX REV CODE 258

## 2024-08-27 RX ORDER — CARBIDOPA/LEVODOPA 25MG-250MG
1 TABLET ORAL 3 TIMES DAILY
Status: DISCONTINUED | OUTPATIENT
Start: 2024-08-27 | End: 2024-09-01 | Stop reason: HOSPADM

## 2024-08-27 RX ORDER — POLYETHYLENE GLYCOL 3350 17 G/17G
1 POWDER, FOR SOLUTION ORAL
Status: DISCONTINUED | OUTPATIENT
Start: 2024-08-27 | End: 2024-09-01 | Stop reason: HOSPADM

## 2024-08-27 RX ORDER — ONDANSETRON 2 MG/ML
4 INJECTION INTRAMUSCULAR; INTRAVENOUS ONCE
Status: COMPLETED | OUTPATIENT
Start: 2024-08-27 | End: 2024-08-27

## 2024-08-27 RX ORDER — PROCHLORPERAZINE EDISYLATE 5 MG/ML
5-10 INJECTION INTRAMUSCULAR; INTRAVENOUS EVERY 4 HOURS PRN
Status: DISCONTINUED | OUTPATIENT
Start: 2024-08-27 | End: 2024-09-01 | Stop reason: HOSPADM

## 2024-08-27 RX ORDER — AMOXICILLIN 250 MG
2 CAPSULE ORAL EVERY EVENING
Status: DISCONTINUED | OUTPATIENT
Start: 2024-08-27 | End: 2024-09-01 | Stop reason: HOSPADM

## 2024-08-27 RX ORDER — OXYCODONE HYDROCHLORIDE 5 MG/1
2.5 TABLET ORAL
Status: DISCONTINUED | OUTPATIENT
Start: 2024-08-27 | End: 2024-09-01 | Stop reason: HOSPADM

## 2024-08-27 RX ORDER — LAMOTRIGINE 25 MG/1
25 TABLET ORAL DAILY
Status: DISCONTINUED | OUTPATIENT
Start: 2024-08-28 | End: 2024-09-01 | Stop reason: HOSPADM

## 2024-08-27 RX ORDER — SODIUM CHLORIDE, SODIUM LACTATE, POTASSIUM CHLORIDE, CALCIUM CHLORIDE 600; 310; 30; 20 MG/100ML; MG/100ML; MG/100ML; MG/100ML
INJECTION, SOLUTION INTRAVENOUS CONTINUOUS
Status: DISCONTINUED | OUTPATIENT
Start: 2024-08-27 | End: 2024-09-01 | Stop reason: HOSPADM

## 2024-08-27 RX ORDER — ZIPRASIDONE HYDROCHLORIDE 60 MG/1
60 CAPSULE ORAL EVERY EVENING
Status: DISCONTINUED | OUTPATIENT
Start: 2024-08-27 | End: 2024-09-01 | Stop reason: HOSPADM

## 2024-08-27 RX ORDER — GAUZE BANDAGE 2" X 2"
100 BANDAGE TOPICAL DAILY
Status: DISCONTINUED | OUTPATIENT
Start: 2024-08-28 | End: 2024-09-01 | Stop reason: HOSPADM

## 2024-08-27 RX ORDER — PROMETHAZINE HYDROCHLORIDE 25 MG/1
12.5-25 TABLET ORAL EVERY 4 HOURS PRN
Status: DISCONTINUED | OUTPATIENT
Start: 2024-08-27 | End: 2024-09-01 | Stop reason: HOSPADM

## 2024-08-27 RX ORDER — ONDANSETRON 4 MG/1
4 TABLET, ORALLY DISINTEGRATING ORAL EVERY 4 HOURS PRN
Status: DISCONTINUED | OUTPATIENT
Start: 2024-08-27 | End: 2024-09-01 | Stop reason: HOSPADM

## 2024-08-27 RX ORDER — PROMETHAZINE HYDROCHLORIDE 12.5 MG/1
12.5-25 SUPPOSITORY RECTAL EVERY 4 HOURS PRN
Status: DISCONTINUED | OUTPATIENT
Start: 2024-08-27 | End: 2024-09-01 | Stop reason: HOSPADM

## 2024-08-27 RX ORDER — HYDROMORPHONE HYDROCHLORIDE 1 MG/ML
0.25 INJECTION, SOLUTION INTRAMUSCULAR; INTRAVENOUS; SUBCUTANEOUS
Status: DISCONTINUED | OUTPATIENT
Start: 2024-08-27 | End: 2024-09-01 | Stop reason: HOSPADM

## 2024-08-27 RX ORDER — SODIUM CHLORIDE, SODIUM LACTATE, POTASSIUM CHLORIDE, AND CALCIUM CHLORIDE .6; .31; .03; .02 G/100ML; G/100ML; G/100ML; G/100ML
30 INJECTION, SOLUTION INTRAVENOUS ONCE
Status: COMPLETED | OUTPATIENT
Start: 2024-08-27 | End: 2024-08-27

## 2024-08-27 RX ORDER — LEVETIRACETAM 500 MG/1
500 TABLET ORAL 3 TIMES DAILY
Status: DISCONTINUED | OUTPATIENT
Start: 2024-08-28 | End: 2024-09-01 | Stop reason: HOSPADM

## 2024-08-27 RX ORDER — OXYCODONE HYDROCHLORIDE 5 MG/1
5 TABLET ORAL
Status: DISCONTINUED | OUTPATIENT
Start: 2024-08-27 | End: 2024-09-01 | Stop reason: HOSPADM

## 2024-08-27 RX ORDER — ONDANSETRON 2 MG/ML
4 INJECTION INTRAMUSCULAR; INTRAVENOUS EVERY 4 HOURS PRN
Status: DISCONTINUED | OUTPATIENT
Start: 2024-08-27 | End: 2024-09-01 | Stop reason: HOSPADM

## 2024-08-27 RX ADMIN — PIPERACILLIN AND TAZOBACTAM 3.38 G: 3; .375 INJECTION, POWDER, FOR SOLUTION INTRAVENOUS at 20:33

## 2024-08-27 RX ADMIN — ONDANSETRON 4 MG: 2 INJECTION INTRAMUSCULAR; INTRAVENOUS at 17:59

## 2024-08-27 RX ADMIN — OXYCODONE HYDROCHLORIDE 5 MG: 5 TABLET ORAL at 18:42

## 2024-08-27 RX ADMIN — ZIPRASIDONE HYDROCHLORIDE 60 MG: 60 CAPSULE ORAL at 22:39

## 2024-08-27 RX ADMIN — SODIUM CHLORIDE, POTASSIUM CHLORIDE, SODIUM LACTATE AND CALCIUM CHLORIDE: 600; 310; 30; 20 INJECTION, SOLUTION INTRAVENOUS at 22:46

## 2024-08-27 RX ADMIN — CARBIDOPA AND LEVODOPA 1 TABLET: 25; 250 TABLET ORAL at 22:40

## 2024-08-27 RX ADMIN — SENNOSIDES AND DOCUSATE SODIUM 2 TABLET: 50; 8.6 TABLET ORAL at 18:53

## 2024-08-27 RX ADMIN — PIPERACILLIN AND TAZOBACTAM 3.38 G: 3; .375 INJECTION, POWDER, FOR SOLUTION INTRAVENOUS at 16:22

## 2024-08-27 RX ADMIN — SODIUM CHLORIDE, POTASSIUM CHLORIDE, SODIUM LACTATE AND CALCIUM CHLORIDE 1000 ML: 600; 310; 30; 20 INJECTION, SOLUTION INTRAVENOUS at 18:47

## 2024-08-27 SDOH — ECONOMIC STABILITY: TRANSPORTATION INSECURITY
IN THE PAST 12 MONTHS, HAS THE LACK OF TRANSPORTATION KEPT YOU FROM MEDICAL APPOINTMENTS OR FROM GETTING MEDICATIONS?: NO

## 2024-08-27 SDOH — ECONOMIC STABILITY: TRANSPORTATION INSECURITY
IN THE PAST 12 MONTHS, HAS LACK OF RELIABLE TRANSPORTATION KEPT YOU FROM MEDICAL APPOINTMENTS, MEETINGS, WORK OR FROM GETTING THINGS NEEDED FOR DAILY LIVING?: NO

## 2024-08-27 ASSESSMENT — FIBROSIS 4 INDEX
FIB4 SCORE: 0.37
FIB4 SCORE: 0.92

## 2024-08-27 ASSESSMENT — COGNITIVE AND FUNCTIONAL STATUS - GENERAL
DAILY ACTIVITIY SCORE: 16
CLIMB 3 TO 5 STEPS WITH RAILING: TOTAL
DRESSING REGULAR LOWER BODY CLOTHING: A LOT
DRESSING REGULAR UPPER BODY CLOTHING: A LOT
SUGGESTED CMS G CODE MODIFIER DAILY ACTIVITY: CK
WALKING IN HOSPITAL ROOM: TOTAL
TURNING FROM BACK TO SIDE WHILE IN FLAT BAD: A LOT
MOVING FROM LYING ON BACK TO SITTING ON SIDE OF FLAT BED: A LOT
MOVING TO AND FROM BED TO CHAIR: A LOT
TOILETING: A LOT
STANDING UP FROM CHAIR USING ARMS: A LOT
SUGGESTED CMS G CODE MODIFIER MOBILITY: CL
MOBILITY SCORE: 10
HELP NEEDED FOR BATHING: A LOT

## 2024-08-27 ASSESSMENT — LIFESTYLE VARIABLES
TOTAL SCORE: 0
CONSUMPTION TOTAL: NEGATIVE
EVER HAD A DRINK FIRST THING IN THE MORNING TO STEADY YOUR NERVES TO GET RID OF A HANGOVER: NO
HAVE YOU EVER FELT YOU SHOULD CUT DOWN ON YOUR DRINKING: NO
TOTAL SCORE: 0
TOTAL SCORE: 0
HOW MANY TIMES IN THE PAST YEAR HAVE YOU HAD 5 OR MORE DRINKS IN A DAY: 0
TOTAL SCORE: 0
CONSUMPTION TOTAL: NEGATIVE
TOTAL SCORE: 0
EVER FELT BAD OR GUILTY ABOUT YOUR DRINKING: NO
HOW MANY TIMES IN THE PAST YEAR HAVE YOU HAD 5 OR MORE DRINKS IN A DAY: 0
EVER HAD A DRINK FIRST THING IN THE MORNING TO STEADY YOUR NERVES TO GET RID OF A HANGOVER: NO
DOES PATIENT WANT TO STOP DRINKING: NO
HAVE YOU EVER FELT YOU SHOULD CUT DOWN ON YOUR DRINKING: NO
HAVE PEOPLE ANNOYED YOU BY CRITICIZING YOUR DRINKING: NO
ON A TYPICAL DAY WHEN YOU DRINK ALCOHOL HOW MANY DRINKS DO YOU HAVE: 0
TOTAL SCORE: 0
ALCOHOL_USE: NO
ON A TYPICAL DAY WHEN YOU DRINK ALCOHOL HOW MANY DRINKS DO YOU HAVE: 0
DOES PATIENT WANT TO STOP DRINKING: NO
AVERAGE NUMBER OF DAYS PER WEEK YOU HAVE A DRINK CONTAINING ALCOHOL: 0
EVER FELT BAD OR GUILTY ABOUT YOUR DRINKING: NO
AVERAGE NUMBER OF DAYS PER WEEK YOU HAVE A DRINK CONTAINING ALCOHOL: 0
HAVE PEOPLE ANNOYED YOU BY CRITICIZING YOUR DRINKING: NO
ALCOHOL_USE: NO

## 2024-08-27 ASSESSMENT — PAIN DESCRIPTION - PAIN TYPE
TYPE: ACUTE PAIN;CHRONIC PAIN
TYPE: ACUTE PAIN

## 2024-08-27 ASSESSMENT — PATIENT HEALTH QUESTIONNAIRE - PHQ9
1. LITTLE INTEREST OR PLEASURE IN DOING THINGS: NOT AT ALL
2. FEELING DOWN, DEPRESSED, IRRITABLE, OR HOPELESS: NOT AT ALL
2. FEELING DOWN, DEPRESSED, IRRITABLE, OR HOPELESS: NOT AT ALL
SUM OF ALL RESPONSES TO PHQ9 QUESTIONS 1 AND 2: 0
1. LITTLE INTEREST OR PLEASURE IN DOING THINGS: NOT AT ALL
SUM OF ALL RESPONSES TO PHQ9 QUESTIONS 1 AND 2: 0

## 2024-08-27 ASSESSMENT — SOCIAL DETERMINANTS OF HEALTH (SDOH)
WITHIN THE LAST YEAR, HAVE YOU BEEN HUMILIATED OR EMOTIONALLY ABUSED IN OTHER WAYS BY YOUR PARTNER OR EX-PARTNER?: NO
WITHIN THE LAST YEAR, HAVE YOU BEEN KICKED, HIT, SLAPPED, OR OTHERWISE PHYSICALLY HURT BY YOUR PARTNER OR EX-PARTNER?: NO
WITHIN THE PAST 12 MONTHS, YOU WORRIED THAT YOUR FOOD WOULD RUN OUT BEFORE YOU GOT THE MONEY TO BUY MORE: NEVER TRUE
WITHIN THE PAST 12 MONTHS, THE FOOD YOU BOUGHT JUST DIDN'T LAST AND YOU DIDN'T HAVE MONEY TO GET MORE: NEVER TRUE
WITHIN THE LAST YEAR, HAVE YOU BEEN HUMILIATED OR EMOTIONALLY ABUSED IN OTHER WAYS BY YOUR PARTNER OR EX-PARTNER?: NO
WITHIN THE LAST YEAR, HAVE TO BEEN RAPED OR FORCED TO HAVE ANY KIND OF SEXUAL ACTIVITY BY YOUR PARTNER OR EX-PARTNER?: NO
IN THE PAST 12 MONTHS, HAS THE ELECTRIC, GAS, OIL, OR WATER COMPANY THREATENED TO SHUT OFF SERVICE IN YOUR HOME?: NO
WITHIN THE LAST YEAR, HAVE YOU BEEN KICKED, HIT, SLAPPED, OR OTHERWISE PHYSICALLY HURT BY YOUR PARTNER OR EX-PARTNER?: NO
WITHIN THE LAST YEAR, HAVE TO BEEN RAPED OR FORCED TO HAVE ANY KIND OF SEXUAL ACTIVITY BY YOUR PARTNER OR EX-PARTNER?: NO
WITHIN THE LAST YEAR, HAVE YOU BEEN AFRAID OF YOUR PARTNER OR EX-PARTNER?: NO
WITHIN THE LAST YEAR, HAVE YOU BEEN AFRAID OF YOUR PARTNER OR EX-PARTNER?: NO

## 2024-08-27 NOTE — ED TRIAGE NOTES
Chief Complaint   Patient presents with    Abdominal Pain     Mid upper abd pain x 2 weeks.     Nausea    Jaundice     Caregiver reports patient has been yellow in color since yesterday.

## 2024-08-27 NOTE — ED PROVIDER NOTES
ER Provider Note     Zenobia BOSWELL (Zainab), am scribing for, and in the presence of, Summer Fernandes M.D..    Electronically signed by: Zenobia Orellana (Zainab), 8/27/2024 3:05 PM    Summer BOSWELL M.D. personally performed the services described in this documentation, as scribed by Zenobia Orellana in my presence, and it is both accurate and complete.    Primary Care Provider: Castro Noel M.D.    CHIEF COMPLAINT  Chief Complaint   Patient presents with    Abdominal Pain     Mid upper abd pain x 2 weeks.     Nausea    Jaundice     Caregiver reports patient has been yellow in color since yesterday.      LIMITATION TO HISTORY   Select: : None    HPI/ROS  OUTSIDE HISTORIAN(S):  Family Granddaughter at bedside who assisted in providing history as seen below.    EXTERNAL RECORDS REVIEWED  Outpatient Notes outpatient primary care note reviewed.  Patient lives in a group home.  She has prediabetes Parkinson's polycystic ovarian syndrome cognitive decline bipolar disorder partial epilepsy.  She does not drink alcohol.    Erin Santiago is a 44 y.o. female who presents to the ED for abdominal pain onset today. Patient explains this pain began at work. She denies a history of these symptoms. She denies nausea. The granddaughter explains that she was complaining of her abdominal pain last night. She further explains that she was seen earlier for a rash. The granddaughter reports a yellow color of the patience since last night. The granddaughter reports a high fever yesterday.     PAST MEDICAL HISTORY  Past Medical History:   Diagnosis Date    Anxiety     Depression     depression     Diabetes     dx 2013 blood sugars not checked at home    Epilepsy (HCC)     seizure free    Mental disability     Sleep apnea     no cpap medicaid took it away due to non compliance    Snoring     Unspecified urinary incontinence     behavioral       SURGICAL HISTORY  Past Surgical History:   Procedure Laterality Date    LESION  "EXCISION GENERAL  2/20/2014    Performed by Lemuel Fernandez M.D. at SURGERY [facility] DAY [facility]       FAMILY HISTORY  History reviewed. No pertinent family history.    SOCIAL HISTORY   reports that she has quit smoking. Her smoking use included cigarettes. She has never used smokeless tobacco. She reports that she does not drink alcohol and does not use drugs.    CURRENT MEDICATIONS  Current Discharge Medication List        CONTINUE these medications which have NOT CHANGED    Details   permethrin (ELIMITE) 5 % Cream Apply to affected areas topically from head to toe, leave on overnight, and wash off in the morning.  Repeat in 1 week  Qty: 60 g, Refills: 0    Associated Diagnoses: Scabies      cetirizine (ZYRTEC) 10 MG Tab Take 1 Tablet by mouth every day.  Qty: 30 Tablet, Refills: 0    Associated Diagnoses: Scabies      TRUE VITAMIN B1 100 MG Tab TAKE 1 TABLET BY MOUTH ONCE DAILY  Qty: 30 Tablet, Refills: 0      metFORMIN ER (GLUCOPHAGE XR) 500 MG TABLET SR 24 HR TAKE 1 TABLET BY MOUTH ONCE DAILY FOR PRE-DIABETES.  Qty: 30 Tablet, Refills: 0      Multiple Vitamins-Minerals (MULTIVITAMIN ADULT, MINERALS,) Tab TAKE 1 TABLET BY MOUTH ONCE DAILY  Qty: 30 Tablet, Refills: 0      Incontinence Supply Disposable (PREVAIL SUPER PLUS XLARGE) Misc WEAR DAILY, CHANGE AS NEEDED.  Qty: 98 Each, Refills: 3      lamoTRIgine (LAMICTAL) 25 MG Tab Take 1 Tab by mouth every day.  Qty: 90 Tab, Refills: 1      carbidopa-levodopa (SINEMET)  MG Tab Take 1 Tab by mouth 3 times a day.  Qty: 270 Tab, Refills: 1      levETIRAcetam (KEPPRA) 500 MG Tab Take 1 Tab by mouth 3 times a day.  Qty: 270 Tab, Refills: 1      ziprasidone (GEODON) 60 MG Cap Take 60 mg by mouth every evening.             ALLERGIES  Acetaminophen, Other misc, Penicillins, and Shellfish allergy    PHYSICAL EXAM  /78   Pulse 96   Temp 36.8 °C (98.3 °F) (Oral)   Resp 18   Ht 1.549 m (5' 1\")   Wt 87.5 kg (192 lb 14.4 oz)   SpO2 98%   BMI 36.45 kg/m² "   Constitutional: Alert in no apparent distress. Jaundice,   HENT: No signs of trauma, Bilateral external ears normal, Nose normal.   Eyes: Pupils are equal and reactive, Conjunctiva normal, icteric.   Neck:  No stridor.   Cardiovascular: Regular rate and rhythm, no murmurs.   Thorax & Lungs: Normal breath sounds, No respiratory distress, No wheezing, No chest tenderness.   Abdomen: Right upper quadrant tenderness, Positive Gonzalez's sign.   Skin: Warm, Dry, No erythema, Diffuse scabbing rash all over body  Musculoskeletal:  No major deformities noted.   Neurologic: Alert, moving all extremities without difficulty, no focal deficits.      DIAGNOSTIC STUDIES & PROCEDURES    Labs:   Results for orders placed or performed during the hospital encounter of 08/27/24   Troponin   Result Value Ref Range    Troponin T <6 6 - 19 ng/L   CBC WITH DIFFERENTIAL   Result Value Ref Range    WBC 21.6 (H) 4.8 - 10.8 K/uL    RBC 4.70 4.20 - 5.40 M/uL    Hemoglobin 14.6 12.0 - 16.0 g/dL    Hematocrit 43.5 37.0 - 47.0 %    MCV 92.6 81.4 - 97.8 fL    MCH 31.1 27.0 - 33.0 pg    MCHC 33.6 32.2 - 35.5 g/dL    RDW 51.1 (H) 35.9 - 50.0 fL    Platelet Count 448 (H) 164 - 446 K/uL    MPV 10.2 9.0 - 12.9 fL    Neutrophils-Polys 85.60 (H) 44.00 - 72.00 %    Lymphocytes 5.60 (L) 22.00 - 41.00 %    Monocytes 7.80 0.00 - 13.40 %    Eosinophils 0.10 0.00 - 6.90 %    Basophils 0.30 0.00 - 1.80 %    Immature Granulocytes 0.60 0.00 - 0.90 %    Nucleated RBC 0.00 0.00 - 0.20 /100 WBC    Neutrophils (Absolute) 18.50 (H) 1.82 - 7.42 K/uL    Lymphs (Absolute) 1.21 1.00 - 4.80 K/uL    Monos (Absolute) 1.68 (H) 0.00 - 0.85 K/uL    Eos (Absolute) 0.03 0.00 - 0.51 K/uL    Baso (Absolute) 0.06 0.00 - 0.12 K/uL    Immature Granulocytes (abs) 0.14 (H) 0.00 - 0.11 K/uL    NRBC (Absolute) 0.00 K/uL   COMP METABOLIC PANEL   Result Value Ref Range    Sodium 139 135 - 145 mmol/L    Potassium 3.8 3.6 - 5.5 mmol/L    Chloride 102 96 - 112 mmol/L    Co2 23 20 - 33 mmol/L     Anion Gap 14.0 7.0 - 16.0    Glucose 132 (H) 65 - 99 mg/dL    Bun 11 8 - 22 mg/dL    Creatinine 0.45 (L) 0.50 - 1.40 mg/dL    Calcium 9.3 8.5 - 10.5 mg/dL    Correct Calcium 9.5 8.5 - 10.5 mg/dL    AST(SGOT) 129 (H) 12 - 45 U/L    ALT(SGPT) 206 (H) 2 - 50 U/L    Alkaline Phosphatase 525 (H) 30 - 99 U/L    Total Bilirubin 12.2 (H) 0.1 - 1.5 mg/dL    Albumin 3.7 3.2 - 4.9 g/dL    Total Protein 6.9 6.0 - 8.2 g/dL    Globulin 3.2 1.9 - 3.5 g/dL    A-G Ratio 1.2 g/dL   LIPASE   Result Value Ref Range    Lipase 1016 (H) 11 - 82 U/L   HCG QUAL SERUM   Result Value Ref Range    Beta-Hcg Qualitative Serum Negative Negative   ESTIMATED GFR   Result Value Ref Range    GFR (CKD-EPI) 120 >60 mL/min/1.73 m 2   Lactic Acid   Result Value Ref Range    Lactic Acid 1.5 0.5 - 2.0 mmol/L   Blood Culture - Draw one from central line and one from peripheral site    Specimen: Peripheral; Blood   Result Value Ref Range    Significant Indicator NEG     Source BLD     Site PERIPHERAL     Culture Result       No Growth  Note: Blood cultures are incubated for 5 days and  are monitored continuously.Positive blood cultures  are called to the RN and reported as soon as  they are identified.     Blood Culture - Draw one from central line and one from peripheral site    Specimen: Line; Blood   Result Value Ref Range    Significant Indicator NEG     Source BLD     Site Peripheral     Culture Result       No Growth  Note: Blood cultures are incubated for 5 days and  are monitored continuously.Positive blood cultures  are called to the RN and reported as soon as  they are identified.     EKG   Result Value Ref Range    Report       [facility] Emergency Dept.    Test Date:  2024  Pt Name:    PHU RICHARDS                  Department: ER  MRN:        [id number]                      Room:  Gender:     Female                       Technician: [id number]  :        1980                   Requested By:ER TRIAGE PROTOCOL  Order #:     [id number]                    Reading MD:    Measurements  Intervals                                Axis  Rate:       100                          P:          44  AZ:         145                          QRS:        13  QRSD:       73                           T:          37  QT:         338  QTc:        436    Interpretive Statements  Sinus tachycardia  Abnormal R-wave progression, early transition  Compared to ECG 2023 12:54:45  Sinus rhythm no longer present  Possible ischemia no longer present     EKG   Result Value Ref Range    Report       [facility] Emergency Dept.    Test Date:  2024  Pt Name:    PHU RICHARDS                  Department: ER  MRN:        [id number]                      Room:       T725  Gender:     Female                       Technician: [id number]  :        1980                   Requested By:ER TRIAGE PROTOCOL  Order #:    [id number]                    Reading MD: DEBBIE REICH    Measurements  Intervals                                Axis  Rate:       100                          P:          44  AZ:         145                          QRS:        13  QRSD:       73                           T:          37  QT:         338  QTc:        436    Interpretive Statements  Sinus tachycardia  Abnormal R-wave progression, early transition  Compared to ECG 2023 12:54:45  Sinus rhythm no longer present  Possible ischemia no longer present  Impression: Sinus tachycardia no evidence of ischemia.  Electronically Signed On 2024 20:22:25 PDT by DEBBIE REICH       All labs reviewed by me.    EKG:   I have independently interpreted this EKG as seen above.    Radiology:   The attending Emergency Physician has independently interpreted the diagnostic imaging associated with this visit and is awaiting the final reading from the radiologist, which will be displayed below.    Preliminary interpretation is a follows: Dilated CBD seen  Radiologist interpretation:    US-RUQ   Final Result      1.  Dilated pancreatic and common bile duct. Recommend MRCP or ERCP for evaluation of distal obstruction or stenosis.   2.  Gallbladder wall thickening with sludge. No discrete cholelithiasis. Findings are equivocal for acute cholecystitis. Consider further evaluation with HIDA scan if indicated.   3.  Borderline dilated portal vein, concerning for portal hypertension.      DX-CHEST-PORTABLE (1 VIEW)   Final Result      No acute cardiac or pulmonary abnormalities are identified.      [id number]    (Results Pending)      COURSE & MEDICAL DECISION MAKING      3:05 PM - Patient seen and evaluated at bedside. Ordered EKG, Urinalysis, HCG Qual Serum, Lipase, CMP, CBC w/ Diff, Troponin, Estimated GFR, Blood culture, Urine culture, Lactic acid, DX-Chest, and US-RUQ to evaluate. She understands and agrees to the plan of care. Differential diagnoses include but are not limited to: Ascending cholangitis, Cholecystitis     5:12 PM - I discussed the patient's case and the above findings with Dr. James (GI) who asked for ERCP.  I did spoke with ISSA Rodrigues on the GI service who will consult and help manage.  I spoke with [facility] family resident who will hospitalize the patient.    INITIAL ASSESSMENT AND PLAN  Care Narrative: This is a 44-year-old female who presents with jaundice right upper quadrant abdominal pain and reported history of fever.  Given this triad I was concerned for cholangitis.  She is quite jaundiced she has right upper quadrant tenderness on exam.  Labs were significant for an elevated white count of 21.6, lipase of thousand, abnormal LFTs with a T. bili of 12.  Again I do think this is concerning for an obstructive biliary process.  Ultrasound showed dilated CBD as well as pancreatic duct.  I spoke with GI who ultimately requested a stat MRCP and will likely plan for ERCP.  Patient was given empiric antibiotics she tolerated Zosyn without difficulty.  Patient will  be hospitalized in guarded condition for management of this at this time.                     DISPOSITION AND DISCUSSIONS  I have discussed management of the patient with the following physicians and JACQUIE's: Dr. James (GI), [facility] family        FINAL IMPRESSION  1. Cholangitis    2. Choledocholithiasis      -ADMIT-     Zenobia BOSWELL (Zainab), am scribing for, and in the presence of, Summer Fernandes M.D..    Electronically signed by: Zenobia Orellana (Zainab), 8/27/2024    ISummer M.D. personally performed the services described in this documentation, as scribed by Zenobia Orellana in my presence, and it is both accurate and complete.    The note accurately reflects work and decisions made by me.  Summer Fernandes M.D.  8/27/2024  8:25 PM

## 2024-08-28 ENCOUNTER — APPOINTMENT (OUTPATIENT)
Dept: RADIOLOGY | Facility: MEDICAL CENTER | Age: 44
DRG: 871 | End: 2024-08-28
Attending: EMERGENCY MEDICINE
Payer: MEDICARE

## 2024-08-28 ENCOUNTER — APPOINTMENT (OUTPATIENT)
Dept: RADIOLOGY | Facility: MEDICAL CENTER | Age: 44
DRG: 871 | End: 2024-08-28
Payer: MEDICARE

## 2024-08-28 LAB
ALBUMIN SERPL BCP-MCNC: 2.8 G/DL (ref 3.2–4.9)
ALBUMIN/GLOB SERPL: 1 G/DL
ALP SERPL-CCNC: 467 U/L (ref 30–99)
ALT SERPL-CCNC: 51 U/L (ref 2–50)
ANION GAP SERPL CALC-SCNC: 10 MMOL/L (ref 7–16)
APPEARANCE UR: CLEAR
AST SERPL-CCNC: 87 U/L (ref 12–45)
BACTERIA #/AREA URNS HPF: NEGATIVE /HPF
BASOPHILS # BLD AUTO: 0.2 % (ref 0–1.8)
BASOPHILS # BLD: 0.03 K/UL (ref 0–0.12)
BILIRUB SERPL-MCNC: 12 MG/DL (ref 0.1–1.5)
BILIRUB UR QL STRIP.AUTO: ABNORMAL
BUN SERPL-MCNC: 7 MG/DL (ref 8–22)
CALCIUM ALBUM COR SERPL-MCNC: 9.7 MG/DL (ref 8.5–10.5)
CALCIUM SERPL-MCNC: 8.7 MG/DL (ref 8.5–10.5)
CHLORIDE SERPL-SCNC: 104 MMOL/L (ref 96–112)
CHOLEST SERPL-MCNC: 232 MG/DL (ref 100–199)
CO2 SERPL-SCNC: 24 MMOL/L (ref 20–33)
COLOR UR: ABNORMAL
CREAT SERPL-MCNC: 0.27 MG/DL (ref 0.5–1.4)
EOSINOPHIL # BLD AUTO: 0.15 K/UL (ref 0–0.51)
EOSINOPHIL NFR BLD: 1 % (ref 0–6.9)
EPI CELLS #/AREA URNS HPF: NEGATIVE /HPF
ERYTHROCYTE [DISTWIDTH] IN BLOOD BY AUTOMATED COUNT: 53.2 FL (ref 35.9–50)
GFR SERPLBLD CREATININE-BSD FMLA CKD-EPI: 136 ML/MIN/1.73 M 2
GLOBULIN SER CALC-MCNC: 2.8 G/DL (ref 1.9–3.5)
GLUCOSE SERPL-MCNC: 90 MG/DL (ref 65–99)
GLUCOSE UR STRIP.AUTO-MCNC: NEGATIVE MG/DL
HCT VFR BLD AUTO: 37.7 % (ref 37–47)
HDLC SERPL-MCNC: 23 MG/DL
HGB BLD-MCNC: 12.4 G/DL (ref 12–16)
HYALINE CASTS #/AREA URNS LPF: NORMAL /LPF
IMM GRANULOCYTES # BLD AUTO: 0.09 K/UL (ref 0–0.11)
IMM GRANULOCYTES NFR BLD AUTO: 0.6 % (ref 0–0.9)
INR PPP: 1.16 (ref 0.87–1.13)
KETONES UR STRIP.AUTO-MCNC: NEGATIVE MG/DL
LDLC SERPL CALC-MCNC: 190 MG/DL
LEUKOCYTE ESTERASE UR QL STRIP.AUTO: ABNORMAL
LYMPHOCYTES # BLD AUTO: 1.23 K/UL (ref 1–4.8)
LYMPHOCYTES NFR BLD: 8.3 % (ref 22–41)
MCH RBC QN AUTO: 30.8 PG (ref 27–33)
MCHC RBC AUTO-ENTMCNC: 32.9 G/DL (ref 32.2–35.5)
MCV RBC AUTO: 93.5 FL (ref 81.4–97.8)
MICRO URNS: ABNORMAL
MONOCYTES # BLD AUTO: 1.34 K/UL (ref 0–0.85)
MONOCYTES NFR BLD AUTO: 9 % (ref 0–13.4)
NEUTROPHILS # BLD AUTO: 11.99 K/UL (ref 1.82–7.42)
NEUTROPHILS NFR BLD: 80.9 % (ref 44–72)
NITRITE UR QL STRIP.AUTO: NEGATIVE
NRBC # BLD AUTO: 0 K/UL
NRBC BLD-RTO: 0 /100 WBC (ref 0–0.2)
PH UR STRIP.AUTO: 6.5 [PH] (ref 5–8)
PLATELET # BLD AUTO: 334 K/UL (ref 164–446)
PMV BLD AUTO: 10.9 FL (ref 9–12.9)
POTASSIUM SERPL-SCNC: 4.1 MMOL/L (ref 3.6–5.5)
PROT SERPL-MCNC: 5.6 G/DL (ref 6–8.2)
PROT UR QL STRIP: NEGATIVE MG/DL
PROTHROMBIN TIME: 14.9 SEC (ref 12–14.6)
RBC # BLD AUTO: 4.03 M/UL (ref 4.2–5.4)
RBC # URNS HPF: NORMAL /HPF
RBC UR QL AUTO: NEGATIVE
SODIUM SERPL-SCNC: 138 MMOL/L (ref 135–145)
SP GR UR STRIP.AUTO: 1.01
TRANS CELLS #/AREA URNS HPF: NORMAL /HPF
TRIGL SERPL-MCNC: 96 MG/DL (ref 0–149)
UROBILINOGEN UR STRIP.AUTO-MCNC: 0.2 MG/DL
WBC # BLD AUTO: 14.8 K/UL (ref 4.8–10.8)
WBC #/AREA URNS HPF: NORMAL /HPF

## 2024-08-28 PROCEDURE — 74181 MRI ABDOMEN W/O CONTRAST: CPT

## 2024-08-28 PROCEDURE — 700105 HCHG RX REV CODE 258

## 2024-08-28 PROCEDURE — 85025 COMPLETE CBC W/AUTO DIFF WBC: CPT

## 2024-08-28 PROCEDURE — 74018 RADEX ABDOMEN 1 VIEW: CPT

## 2024-08-28 PROCEDURE — 70250 X-RAY EXAM OF SKULL: CPT

## 2024-08-28 PROCEDURE — 700102 HCHG RX REV CODE 250 W/ 637 OVERRIDE(OP)

## 2024-08-28 PROCEDURE — 80053 COMPREHEN METABOLIC PANEL: CPT

## 2024-08-28 PROCEDURE — 36415 COLL VENOUS BLD VENIPUNCTURE: CPT

## 2024-08-28 PROCEDURE — 80061 LIPID PANEL: CPT

## 2024-08-28 PROCEDURE — A9270 NON-COVERED ITEM OR SERVICE: HCPCS

## 2024-08-28 PROCEDURE — 85610 PROTHROMBIN TIME: CPT

## 2024-08-28 PROCEDURE — 87086 URINE CULTURE/COLONY COUNT: CPT

## 2024-08-28 PROCEDURE — 770020 HCHG ROOM/CARE - TELE (206)

## 2024-08-28 PROCEDURE — 99222 1ST HOSP IP/OBS MODERATE 55: CPT | Performed by: INTERNAL MEDICINE

## 2024-08-28 PROCEDURE — 81001 URINALYSIS AUTO W/SCOPE: CPT

## 2024-08-28 PROCEDURE — 700111 HCHG RX REV CODE 636 W/ 250 OVERRIDE (IP): Mod: JZ

## 2024-08-28 RX ADMIN — LAMOTRIGINE 25 MG: 25 TABLET ORAL at 05:20

## 2024-08-28 RX ADMIN — CARBIDOPA AND LEVODOPA 1 TABLET: 25; 250 TABLET ORAL at 05:20

## 2024-08-28 RX ADMIN — SODIUM CHLORIDE, POTASSIUM CHLORIDE, SODIUM LACTATE AND CALCIUM CHLORIDE: 600; 310; 30; 20 INJECTION, SOLUTION INTRAVENOUS at 16:47

## 2024-08-28 RX ADMIN — PIPERACILLIN AND TAZOBACTAM 3.38 G: 3; .375 INJECTION, POWDER, FOR SOLUTION INTRAVENOUS at 05:23

## 2024-08-28 RX ADMIN — CARBIDOPA AND LEVODOPA 1 TABLET: 25; 250 TABLET ORAL at 21:56

## 2024-08-28 RX ADMIN — LEVETIRACETAM 500 MG: 500 TABLET, FILM COATED ORAL at 12:54

## 2024-08-28 RX ADMIN — SODIUM CHLORIDE, POTASSIUM CHLORIDE, SODIUM LACTATE AND CALCIUM CHLORIDE: 600; 310; 30; 20 INJECTION, SOLUTION INTRAVENOUS at 08:30

## 2024-08-28 RX ADMIN — PIPERACILLIN AND TAZOBACTAM 3.38 G: 3; .375 INJECTION, POWDER, FOR SOLUTION INTRAVENOUS at 12:54

## 2024-08-28 RX ADMIN — PIPERACILLIN AND TAZOBACTAM 3.38 G: 3; .375 INJECTION, POWDER, FOR SOLUTION INTRAVENOUS at 21:54

## 2024-08-28 RX ADMIN — Medication 100 MG: at 05:20

## 2024-08-28 RX ADMIN — LEVETIRACETAM 500 MG: 500 TABLET, FILM COATED ORAL at 05:20

## 2024-08-28 RX ADMIN — ZIPRASIDONE HYDROCHLORIDE 60 MG: 60 CAPSULE ORAL at 18:14

## 2024-08-28 RX ADMIN — LEVETIRACETAM 500 MG: 500 TABLET, FILM COATED ORAL at 18:14

## 2024-08-28 RX ADMIN — OXYCODONE HYDROCHLORIDE 5 MG: 5 TABLET ORAL at 02:48

## 2024-08-28 RX ADMIN — CARBIDOPA AND LEVODOPA 1 TABLET: 25; 250 TABLET ORAL at 12:54

## 2024-08-28 ASSESSMENT — PAIN DESCRIPTION - PAIN TYPE
TYPE: ACUTE PAIN;CHRONIC PAIN
TYPE: ACUTE PAIN

## 2024-08-28 ASSESSMENT — FIBROSIS 4 INDEX: FIB4 SCORE: 0.92

## 2024-08-28 NOTE — ASSESSMENT & PLAN NOTE
Lives at [facility].   History of developmental delay, PD, and epilepsy. [facility] of the [facility].   Guardian not at bedside at the time of admission, no ACP docs in chart.   Guardian/DPOA: Nay Seals   [phone]

## 2024-08-28 NOTE — PROGRESS NOTES
4 Eyes Skin Assessment Completed by JAROD Mooer RN and JAROD Henao.    Head WDL Jaundice  Ears WDL  Nose WDL  Mouth WDL  Neck Scab  Breast/Chest Scab dry and intact.  Shoulder Blades scabs dry and intact. (1) partial thickness wound to right upper shoulder.   Spine WDL  (R) Arm/Elbow/Hand Redness, Blanching, and Scab dry and intact.  (L) Arm/Elbow/Hand Redness, Blanching, and Scab dry and intact.  Abdomen Scab dry and intact.  Groin Redness  Scrotum/Coccyx/Buttocks Redness, Blanching, and Scab dry and intact.  (R) Leg Redness, Blanching, and Scab dry and intact.  (L) Leg Redness, Blanching, and Scab dry and intact.  (R) Heel/Foot/Toe Redness, Blanching, and Scab dry and intact.  (L) Heel/Foot/Toe Redness, Blanching, and Scab dry and intact.          Devices In Places Tele Box and Pulse Ox      Interventions In Place TAP System and Pillows    Possible Skin Injury Yes    Pictures Uploaded Into Epic Yes  Wound Consult Placed Yes  RN Wound Prevention Protocol Ordered Yes

## 2024-08-28 NOTE — ASSESSMENT & PLAN NOTE
MRCP no signs of choledocholithiasis but did show sludge in gallbladder which is consistent with RUQ US. GI signed off as no indication for ERCP. markedly downtrending liver enzymes as well as bilirubin.  Lipase normalizing.  CBC with white count downtrending.  Surgery consulted, were initially unable to contact patient's decision maker and upon repeat examination abdominal pain has resolved as of 8/30.  Recommended HIDA scan, did not recommend surgery at this time due to likely absence of acute obstruction and high risk due to proximal anatomy seen on MRI.  They recommended close follow-up outpatient with hepatobiliary. Patient is currently clinically doing well and will likely be safe to discharge after advancing back to home diet with restriction of fatty foods.     Plan:  -Advance diet as tolerated  -Transition antibiotics to oral regimen after tolerating diet well  -Pain and antiemetic regimen ordered   -Follow blood cultures, currently NGTD

## 2024-08-28 NOTE — PROGRESS NOTES
Pt arrived to CDU with transport. Pt educated on room, call light and unit. All questions answered.

## 2024-08-28 NOTE — CONSULTS
Date of Consultation:  8/28/2024    Patient: : Erin Santiago  MRN: [id number]    Referring Physician: Dr. MYRA Alvarado     GI:Darvin James M.D.     Reason for Consultation: Abdominal pain, jaundice    History of Present Illness: Erin is a 44-year-old woman with history of developmental delay, epilepsy disorder lives in a group home who presents here for evaluation of abdominal pain and jaundice.  Patient is cognitively impaired and therefore history is obtained from chart review.  Currently denies abdominal pain but reportedly had right-sided abdominal pain with a fever at her assisted living facility and started having some nausea and vomiting.  She is also having worsening jaundice.  She was therefore brought into the emergency room.  In the ER patient was afebrile with stable vitals.  Noted to be jaundiced.  Leukocytosis of 21,000, , , alkaline phosphatase 525, lipase 1016, total bilirubin 12.2.  Chest x-ray no acute abnormalities.  Right upper quadrant ultrasound showed dilated bile and pancreatic ducts.  Platelet count was normal at 448.  Blood cultures currently negative.  MRCP was ordered but is pending.    Currently patient denies any significant abdominal pain, fevers, nausea, vomiting, chills, melena, hematochezia.    Acetaminophen, Other misc, Penicillins, and Shellfish allergy       Past Medical History:   Diagnosis Date    Anxiety     Depression     depression     Diabetes     dx 2013 blood sugars not checked at home    Epilepsy (HCC)     seizure free    Mental disability     Sleep apnea     no cpap medicaid took it away due to non compliance    Snoring     Unspecified urinary incontinence     behavioral         Past Surgical History:   Procedure Laterality Date    LESION EXCISION GENERAL  2/20/2014    Performed by Lemuel Fernandez M.D. at SURGERY SAME DAY [facility]         History reviewed. No pertinent family history.      Social History     Socioeconomic History    Marital  "status: Single   Tobacco Use    Smoking status: Former     Types: Cigarettes    Smokeless tobacco: Never   Vaping Use    Vaping status: Never Used   Substance and Sexual Activity    Alcohol use: No    Drug use: No    Sexual activity: Never     Social Determinants of Health     Food Insecurity: No Food Insecurity (8/27/2024)    Hunger Vital Sign     Worried About Running Out of Food in the Last Year: Never true     Ran Out of Food in the Last Year: Never true   Transportation Needs: No Transportation Needs (8/27/2024)    PRAPARE - Transportation     Lack of Transportation (Medical): No     Lack of Transportation (Non-Medical): No   Intimate Partner Violence: Not At Risk (8/27/2024)    Humiliation, Afraid, Rape, and Kick questionnaire     Fear of Current or Ex-Partner: No     Emotionally Abused: No     Physically Abused: No     Sexually Abused: No   Housing Stability: Low Risk  (8/27/2024)    Housing Stability Vital Sign     Unable to Pay for Housing in the Last Year: No     Number of Times Moved in the Last Year: 0     Homeless in the Last Year: No         ROS  Review of systems negative for 12 systems reviewed other than above-mentioned symptoms.      Physical Exam:  Vitals:    08/27/24 2315 08/27/24 2345 08/28/24 0250 08/28/24 0445   BP: 128/76 130/75 119/73 120/79   Pulse: 99 81  82   Resp: 16 14  16   Temp:  36.7 °C (98.1 °F)     TempSrc: Temporal Temporal  Temporal   SpO2: 93% 93%  91%   Weight:  87.5 kg (192 lb 14.4 oz)  87.5 kg (192 lb 14.4 oz)   Height:  1.549 m (5' 1\")         Physical Exam  General appearance: No apparent distress.  HEENT: Eyes deeply icteric, mucosa dry.  CVS: S1-S2 normal, RRR.  RS: Good air entry bilaterally.  Abdomen: Soft, nontender, nondistended, no guarding.  No organomegaly.  CNS: A&O x3, nonfocal exam.  Extremities: No edema.  Rectal: Deferred.      Labs:  Recent Labs     08/27/24  1328   WBC 21.6*   RBC 4.70   HEMOGLOBIN 14.6   HEMATOCRIT 43.5   MCV 92.6   MCH 31.1   MCHC 33.6 "   RDW 51.1*   PLATELETCT 448*   MPV 10.2     Recent Labs     08/27/24  1328   SODIUM 139   POTASSIUM 3.8   CHLORIDE 102   CO2 23   GLUCOSE 132*   BUN 11           Recent Labs     08/27/24  1328   ASTSGOT 129*   ALTSGPT 206*   TBILIRUBIN 12.2*   ALKPHOSPHAT 525*   GLOBULIN 3.2         Imaging:  US-RUQ  Narrative: 8/27/2024 3:43 PM    HISTORY/REASON FOR EXAM:  Jaundice  Jaundice    TECHNIQUE/EXAM DESCRIPTION AND NUMBER OF VIEWS:  Real-time sonography of the liver and biliary tree.    COMPARISON: None    FINDINGS:  The liver is normal in contour. There is no evidence of solid mass lesion. The liver measures 13.76 cm.    Gallbladder sludge without discrete cholelithiasis.  The gallbladder wall thickness measures 3.80 mm. There is no pericholecystic fluid.  The common duct measures 9.90 mm.    Pancreatic duct measures 5 mm.  The visualized aorta is normal in caliber. Distal aorta is obscured by bowel gas.    Intrahepatic IVC is patent.    The portal vein is patent with hepatopetal flow. The MPV measures 1.50 cm.    The right kidney measures 9.30 cm.    There is no ascites.  Impression: 1.  Dilated pancreatic and common bile duct. Recommend MRCP or ERCP for evaluation of distal obstruction or stenosis.  2.  Gallbladder wall thickening with sludge. No discrete cholelithiasis. Findings are equivocal for acute cholecystitis. Consider further evaluation with HIDA scan if indicated.  3.  Borderline dilated portal vein, concerning for portal hypertension.  DX-CHEST-PORTABLE (1 VIEW)  Narrative: 8/27/2024 2:45 PM    HISTORY/REASON FOR EXAM:  Chest Pain.    TECHNIQUE/EXAM DESCRIPTION AND NUMBER OF VIEWS:  Single portable view of the chest.    COMPARISON: Chest x-ray 7/16/2023    FINDINGS:  Heart size is within normal limits.  No pulmonary infiltrates or consolidations are noted.  No pleural abnormalities are noted.  Impression: No acute cardiac or pulmonary abnormalities are identified.            Impressions:  1.  Abnormal  LFTs.  2.  Gallbladder wall thickening equivocal for acute cholecystitis.  3.  Abdominal pain.  4.  Leukocytosis.      Recommendations:  1.  Ultrasound suggesting dilated CBD and main pancreatic duct.  Would recommend MRI of the abdomen MRCP to assess for any ampullary pathology, choledocholithiasis, pancreatic lesions that might be causing double duct sign.  2.  Maintain on IV antibiotics for presumed ascending cholangitis.  3.  Trend LFTs.  4.  Surgical consult for evaluation of gallbladder.  5.  If MRCP suggest biliary obstruction we will proceed with ERCP.  6.  Clear liquid diet okay.  Advance as tolerated.  N.p.o. past midnight if ERCP planned.    We will follow      This note was generated using voice recognition software which has a small chance of producing errors of grammar and possibly content. I have made every reasonable attempt to find and correct any obvious errors, but expect that some may not be found prior to finalization of this note.

## 2024-08-28 NOTE — ASSESSMENT & PLAN NOTE
Subjective high fever, jaundice, and abdominal pain with transaminitis, elevated alk phos and elevated total bili to 12.2. RUQ US shows dilated pancreatic and common bile duct, gallbladder wall thickening with sludge but no discrete cholelithiasis, findings equivocal for acute cholecystitis.

## 2024-08-28 NOTE — ASSESSMENT & PLAN NOTE
This is Sepsis Present on admission  SIRS criteria identified on my evaluation include: Tachycardia, with heart rate greater than 90 BPM, Tachypnea, with respirations greater than 20 per minute, and Leukocytosis, with WBC greater than 12,000 (downtrend ing and vitals now stable)   Clinical indicators of end organ dysfunction include Acute Liver Failure, with bilirubin greater than 2  Source is GI/cholangitis   Sepsis protocol initiated  Crystalloid Fluid Administration: Fluid resuscitation ordered per standard protocol - 30 mL/kg per current or ideal body weight + 125cc/hr mIVF   IV antibiotics as appropriate for source of sepsis  Blood cultures obtained, NGTD  White count is downtrending, will continue IV antibiotics at this time

## 2024-08-28 NOTE — PROGRESS NOTES
Received bedside report from RN melvin, pt care assumed. VS WDL, pt assessment complete. Pt A&Ox4, c/o 5/10 pain at this time. POC discussed with pt and verbalizes no questions. Pt denies any additional needs at this time. Bed locked and in lowest position, bed alarm on. Pt educated on fall risk and verbalized understanding, call light within reach, hourly rounding initiated.

## 2024-08-28 NOTE — PROGRESS NOTES
[facility] FAMILY MEDICINE PROGRESS NOTE        Attending:   Ulisses Fierro M.D.    Resident:   Sindi Moore D.O.    PATIENT:   Erin Santiago; [id number]; 1980    ID:   44 y.o. female admitted for suspected cholangitis     SUBJECTIVE:   No acute events overnight, reports diffuse abdominal pain.  Denies any nausea, vomiting or other pain at this time.    OBJECTIVE:  Vitals:    08/28/24 0445 08/28/24 0800 08/28/24 0900 08/28/24 1222   BP: 120/79 91/64 101/65 116/68   Pulse: 82   78   Resp: 16   18   Temp:    36.6 °C (97.9 °F)   TempSrc: Temporal   Temporal   SpO2: 91% 90% 92% 93%   Weight: 87.5 kg (192 lb 14.4 oz)      Height:           Intake/Output Summary (Last 24 hours) at 8/28/2024 0615  Last data filed at 8/28/2024 0525  Gross per 24 hour   Intake 3110.41 ml   Output 200 ml   Net 2910.41 ml       PHYSICAL EXAM:  General: Jaundiced in appearance, no acute distress, afebrile, resting comfortably  HEENT: NC/AT. EOMI.   Cardiovascular: RRR without murmurs. Normal capillary refill   Respiratory: CTAB  Abdomen: soft, diffusely tender throughout abdomen, nondistended, no masses  EXT:  BLAKE, no edema  Skin:  jaundice, no erythema/lesions   Neuro: Non-focal    LABS:  Recent Labs     08/27/24  1328 08/28/24  0814   WBC 21.6* 14.8*   RBC 4.70 4.03*   HEMOGLOBIN 14.6 12.4   HEMATOCRIT 43.5 37.7   MCV 92.6 93.5   MCH 31.1 30.8   RDW 51.1* 53.2*   PLATELETCT 448* 334   MPV 10.2 10.9   NEUTSPOLYS 85.60* 80.90*   LYMPHOCYTES 5.60* 8.30*   MONOCYTES 7.80 9.00   EOSINOPHILS 0.10 1.00   BASOPHILS 0.30 0.20     Recent Labs     08/27/24  1328 08/28/24  1121   SODIUM 139 138   POTASSIUM 3.8 4.1   CHLORIDE 102 104   CO2 23 24   BUN 11 7*   CREATININE 0.45* 0.27*   CALCIUM 9.3 8.7   ALBUMIN 3.7 2.8*     Estimated GFR/CRCL = Estimated Creatinine Clearance: 261.8 mL/min (A) (by C-G formula based on SCr of 0.27 mg/dL (L)).  Recent Labs     08/27/24  1328 08/28/24  1121   GLUCOSE 132* 90     Recent Labs     08/27/24  1328  08/28/24  0814 08/28/24  1121   ASTSGOT 129*  --  87*   ALTSGPT 206*  --  51*   TBILIRUBIN 12.2*  --  12.0*   ALKPHOSPHAT 525*  --  467*   GLOBULIN 3.2  --  2.8   INR  --  1.16*  --              Recent Labs     08/28/24  0814   INR 1.16*         IMAGING:  DX-SKULL-LIMITED 3-   Final Result      Unremarkable exam.      [id number] VIEW   Final Result      1.  Mild constipation.      US-RUQ   Final Result      1.  Dilated pancreatic and common bile duct. Recommend MRCP or ERCP for evaluation of distal obstruction or stenosis.   2.  Gallbladder wall thickening with sludge. No discrete cholelithiasis. Findings are equivocal for acute cholecystitis. Consider further evaluation with HIDA scan if indicated.   3.  Borderline dilated portal vein, concerning for portal hypertension.      DX-CHEST-PORTABLE (1 VIEW)   Final Result      No acute cardiac or pulmonary abnormalities are identified.      [id number]-Y/O    (Results Pending)       MEDS:  Current Facility-Administered Medications   Medication Last Admin    lactated ringers infusion New Bag at 08/28/24 0830    Pharmacy Consult Request ...Pain Management Review 1 Each      oxyCODONE immediate-release (Roxicodone) tablet 2.5 mg      Or    oxyCODONE immediate-release (Roxicodone) tablet 5 mg 5 mg at 08/28/24 0248    Or    HYDROmorphone (Dilaudid) injection 0.25 mg      ondansetron (Zofran) syringe/vial injection 4 mg      ondansetron (Zofran ODT) dispertab 4 mg      promethazine (Phenergan) tablet 12.5-25 mg      promethazine (Phenergan) suppository 12.5-25 mg      prochlorperazine (Compazine) injection 5-10 mg      senna-docusate (Pericolace Or Senokot S) 8.6-50 MG per tablet 2 Tablet 2 Tablet at 08/27/24 1853    And    polyethylene glycol/lytes (Miralax) Packet 1 Packet      piperacillin-tazobactam (Zosyn) 3.375 g in  mL IVPB 3.375 g at 08/28/24 1254    carbidopa-levodopa (Sinemet)  MG tablet 1 Tablet 1 Tablet at 08/28/24 1254    lamoTRIgine (LaMICtal)  tablet 25 mg 25 mg at 08/28/24 0520    levETIRAcetam (Keppra) tablet 500 mg 500 mg at 08/28/24 1254    ziprasidone (Geodon) capsule 60 mg 60 mg at 08/27/24 2239    thiamine (Vitamin B-1) tablet 100 mg 100 mg at 08/28/24 0520       ASSESSMENT/PLAN:    Transaminitis  Assessment & Plan  -Trend CMP, LFTs appear to be downtrending  -Follow MRCP, hepatitis panel, lipid panel   -GI following     Abdominal pain- (present on admission)  Assessment & Plan  Differential highly suspicious for acute cholangitis with biliary obstruction. Subjective reports of high fever with jaundice and abdominal pain. Labs with leukocytosis, transaminitis and elevated alk phos in the 500s and total bili to 12.2. RUQ US shows dilated pancreatic and common bile duct, gallbladder wall thickening with sludge but no discrete cholelithiasis (equivocal for cholecystitis). Lipase elevated to 1016. Positive Gonzalez sign on exam with scleral icterus and jaundice to face. S/p 1 dose of Zosyn in the ED. LFTs appear to be downtrending, although still elevated and noted hyperbilirubinemia stable.    Plan:  -GI NP Hernandez and Dr. James consulted, recommended MRCP which is pending and will likely go for ERCP following results  -NPO pending potential ERCP  -Sepsis protocol as below   -Continue IV Zosyn q8h   -Pain and antiemetic regimen ordered   -Follow blood cultures     Sepsis (HCC)- (present on admission)  Assessment & Plan  This is Sepsis Present on admission  SIRS criteria identified on my evaluation include: Tachycardia, with heart rate greater than 90 BPM, Tachypnea, with respirations greater than 20 per minute, and Leukocytosis, with WBC greater than 12,000 (downtrend ing and vitals now stable)   Clinical indicators of end organ dysfunction include Acute Liver Failure, with bilirubin greater than 2  Source is GI/cholangitis   Sepsis protocol initiated  Crystalloid Fluid Administration: Fluid resuscitation ordered per standard protocol - 30 mL/kg per  current or ideal body weight + 125cc/hr mIVF   IV antibiotics as appropriate for source of sepsis  Blood cultures obtained and pending, cont to follow    Leukocytosis- (present on admission)  Assessment & Plan  Elevated to 21 on admission, now downtrending with IV antibiotics    -Continue IV Zosyn  -Vital signs q4h, monitor for fevers  -Follow blood cultures     Lives in group home- (present on admission)  Assessment & Plan  Lives at [facility].   History of developmental delay, PD, and epilepsy. [place]   Guardian not at bedside at the time of admission, no ACP docs in chart. Unable to contact.   Guardian/DPOA: Nay Seals   [phone]     Seizure (HCC)- (present on admission)  Assessment & Plan  Continue home medications  Seizure precautions     Parkinson disease (HCC)- (present on admission)  Assessment & Plan  Continue home medications    Bipolar 1 disorder, depressed, moderate (HCC)- (present on admission)  Assessment & Plan  Continue home medications         Core Measures  Fluids: 125 cc/h maintenance fluids  Lines: Peripheral IV for intravenous access  Abx: IV Zosyn every 8 hours  Diet: NPO pending MR and possible ERCP  PPX: SCDs    CODE Status: Full Code     Sindi Moore, DO  PGY-2, [facility]

## 2024-08-28 NOTE — PROGRESS NOTES
GI APRN update:    MRCP reviewed by Dr. James. 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    [facility] team will sign off. Please don't hesitate to re consult or reach out if needed.    ..Xuan Ng, DNP,  APRN

## 2024-08-28 NOTE — PROGRESS NOTES
Monitor Summary  Rhythm: SR  Rate: 72-86  Ectopy: rare coup, freq pvc's, freq pac's  Measurements: 0.14/0.08/0.36  ---12 hr Chart Review---    ---MONITOR STRIPS TO BE SCANNED INTO EPIC---

## 2024-08-28 NOTE — ASSESSMENT & PLAN NOTE
Elevated to 21 on admission, now downtrending with IV antibiotics    -In addition to IV ceftriaxone 2 g daily  -Vital signs q4h, monitor for fevers  -Follow blood cultures, NGTD

## 2024-08-28 NOTE — PROGRESS NOTES
Pt A&Ox4, delayed to response. Pt c/o10/10 abdominal pain, medicated per MAR. IVF infusing per MAR.   Bed in locked, lowest position.  Call light and belongings within reach.  Needs met.

## 2024-08-28 NOTE — H&P
FAMILY MEDICINE HISTORY AND PHYSICAL       PATIENT ID:  NAME:  Erin Santiago  MRN:               [id number]  YOB: 1980    Date of Admission: 8/27/2024     Attending: Ulisses Fierro M.d.     Resident: Vel Alvarado D.O. (PGY-2)    Primary Care Physician:  Castro Noel M.D.    CC:    Chief Complaint   Patient presents with    Abdominal Pain     Mid upper abd pain x 2 weeks.     Nausea    Jaundice     Caregiver reports patient has been yellow in color since yesterday.        HPI: Erin Santiago is a 44 y.o. female with a history of developmental delay, Parkinson disease, and epilepsy who was transferred from her group home for fever, abdominal pain, and yellowing of her eyes and skin.      During my interview, patient was alone without her guardian or staff from her group home.  She is cognitively and developmentally declined at baseline and thus history is limited.  She is able to report that her pain began approximately 1 week ago in the right side of her abdomen, felt to get better, and then started to get worse yesterday.  She developed a high fever the earlier today while she was at work but cannot identify how high. She is states that staff at her group home have noticed that her eyes and her skin have been turning yellow.  She has been nauseated with vomiting though cannot tell me how many episodes of vomiting she has had or what it looks like.  She denies diarrhea/constipation.  She denies any pain with urination.  She states that her group home is responsible for giving her her medications, she thinks that she only takes her medications once in the morning.  She last remembers taking them this morning.     History is otherwise limited due to patient's baseline developmental delay.  In her chart, her guardian is listed as Nay Seals.  I attempted to contact her but was unable to reach her.     ER Course:  In the ER, Erin was afebrile with a temperature of 98.3F, heart rate of 96,  respiratory rate 18 which increased to 22, blood pressure 115/78, satting on room air at 98% saturation.  She appeared to be diffusely jaundiced and had scleral icterus.  She was cognitively delayed at her baseline.  Labs revealed a leukocytosis to 21.6 no anemia, AST of 129, , alkaline phosphatase elevated to 525, lipase 1016, total bili 12.2.  Lactic acid 1.5.  EKG showed sinus tachycardia, troponin less than 6.  Chest x-ray showed no acute cardiopulmonary abnormalities.  A right upper quadrant ultrasound showed dilated pancreatic and common bile ducts, with recommendation for MRCP versus ERCP.  The gallbladder was thickened with sludge, no visible cholelithiasis, consider HIDA scan.  The portal vein was mildly distended, concerning for portal hypertension.  With the ERP consulted to gastroenterology and discussed with ISSA Ng and Dr. James, with recommendation to pursue MRCP and likely ERCP tomorrow morning.  Patient was given a dose of IV Zosyn in the ED after blood cultures were drawn.    REVIEW OF SYSTEMS:   Ten systems reviewed and were negative except as noted in the HPI.                PAST MEDICAL HISTORY:   has a past medical history of Anxiety, Depression, Diabetes, Epilepsy (HCC), Mental disability, Sleep apnea, Snoring, and Unspecified urinary incontinence.     PAST SURGICAL HISTORY:   has a past surgical history that includes lesion excision general (2/20/2014).     FAMILY HISTORY:  family history is not on file.     SOCIAL HISTORY:   Limited secondary to patient's developmental delay    DIET:   Orders Placed This Encounter   Procedures    Diet Order Diet: Regular     Standing Status:   Standing     Number of Occurrences:   1     Order Specific Question:   Diet:     Answer:   Regular [1]    Diet NPO Restrict to: Sips with Medications     Standing Status:   Standing     Number of Occurrences:   8     Order Specific Question:   Diet NPO Restrict to:     Answer:   Sips with  Medications [3]       ALLERGIES:  Allergies   Allergen Reactions    Acetaminophen      Unknown per patient and caregiver    Other Misc      bees    Penicillins Hives     Per patien    Shellfish Allergy Hives     Get hives per patient       OUTPATIENT MEDICATIONS:    Current Facility-Administered Medications:     lactated ringers infusion, , Intravenous, Continuous, FREDERICK Ramirez.O.    Notify provider if pain remains uncontrolled, , , CONTINUOUS **AND** Use the Numeric Rating Scale (NRS), Purvis-Baker Faces (WBF), or FLACC on regular floors and Critical-Care Pain Observation Tool (CPOT) on ICUs/Trauma to assess pain, , , CONTINUOUS **AND** Pulse Ox, , , CONTINUOUS **AND** Pharmacy Consult Request ...Pain Management Review 1 Each, 1 Each, Other, PHARMACY TO DOSE **AND** If patient difficult to arouse and/or has respiratory depression (respiratory rate of 10 or less), stop any opiates that are currently infusing and call a Rapid Response., , , CONTINUOUS, MARCELLO RamirezO.    oxyCODONE immediate-release (Roxicodone) tablet 2.5 mg, 2.5 mg, Oral, Q3HRS PRN **OR** oxyCODONE immediate-release (Roxicodone) tablet 5 mg, 5 mg, Oral, Q3HRS PRN, 5 mg at 08/27/24 1842 **OR** HYDROmorphone (Dilaudid) injection 0.25 mg, 0.25 mg, Intravenous, Q3HRS PRN, FREDERICK Ramirez.O.    ondansetron (Zofran) syringe/vial injection 4 mg, 4 mg, Intravenous, Q4HRS PRN, FREDERICK Ramirez.O.    ondansetron (Zofran ODT) dispertab 4 mg, 4 mg, Oral, Q4HRS PRN, FREDERICK Ramirez.O.    promethazine (Phenergan) tablet 12.5-25 mg, 12.5-25 mg, Oral, Q4HRS PRN, FREDERICK Ramirez.O.    promethazine (Phenergan) suppository 12.5-25 mg, 12.5-25 mg, Rectal, Q4HRS PRN, FREDERICK Ramirez.O.    prochlorperazine (Compazine) injection 5-10 mg, 5-10 mg, Intravenous, Q4HRS PRN, Vel Alvarado D.O.    senna-docusate (Pericolace Or Senokot S) 8.6-50 MG per tablet 2 Tablet, 2 Tablet, Oral, Q EVENING, 2 Tablet at 08/27/24 9189 **AND** polyethylene  glycol/lytes (Miralax) Packet 1 Packet, 1 Packet, Oral, QDAY PRN, ShyannysMARCELLO ParikhO.    piperacillin-tazobactam (Zosyn) 3.375 g in  mL IVPB, 3.375 g, Intravenous, Q8HRS, NalysFREDERICK Parikh.O.    PHYSICAL EXAM:  Vitals:    24 1700 24 1800 24 1825 24 1903   BP: 130/76 112/69 138/83 132/83   Pulse: 82 89  86   Resp: (!) 22 15 16 16   Temp:    36.9 °C (98.4 °F)   TempSrc:    Temporal   SpO2: 94% 94% 94% 94%   Weight:       Height:       , Temp (24hrs), Av.9 °C (98.4 °F), Min:36.8 °C (98.3 °F), Max:36.9 °C (98.4 °F)  , Pulse Oximetry: 94 %, O2 (LPM): 0, O2 Delivery Device: None - Room Air    Physical Exam  Constitutional:       Appearance: She is obese. She is not diaphoretic.   HENT:      Head: Normocephalic and atraumatic.      Right Ear: Tympanic membrane and external ear normal.      Left Ear: Tympanic membrane and external ear normal.      Nose: Nose normal. No congestion or rhinorrhea.      Mouth/Throat:      Pharynx: Oropharynx is clear. No oropharyngeal exudate or posterior oropharyngeal erythema.   Eyes:      General: Scleral icterus present.      Pupils: Pupils are equal, round, and reactive to light.   Neck:      Vascular: No carotid bruit.   Cardiovascular:      Rate and Rhythm: Normal rate and regular rhythm.      Pulses: Normal pulses.   Pulmonary:      Effort: Pulmonary effort is normal. No respiratory distress.      Breath sounds: No wheezing, rhonchi or rales.   Abdominal:      General: Abdomen is flat.      Palpations: Abdomen is soft. There is no mass.      Tenderness: There is no guarding or rebound.      Comments: Decreased bowel sounds. Tenderness to epigastrium and right upper quadrant with positive Gonzalez sign    Musculoskeletal:         General: No swelling or tenderness.      Cervical back: No rigidity or tenderness.      Right lower leg: No edema.      Left lower leg: No edema.   Skin:     Coloration: Skin is jaundiced.      Findings: Rash (diffuse papular  rash with overlying scattered excoriation, old appearing) present.   Neurological:      General: No focal deficit present.      Mental Status: She is alert. Mental status is at baseline.      Comments: Cognitively delayed. Able to answer basic questions and is awake, alert, and oriented to person, place, and date. Able to recall recent events.             LAB TESTS:   Results for orders placed or performed during the hospital encounter of 08/27/24   Troponin   Result Value Ref Range    Troponin T <6 6 - 19 ng/L   CBC WITH DIFFERENTIAL   Result Value Ref Range    WBC 21.6 (H) 4.8 - 10.8 K/uL    RBC 4.70 4.20 - 5.40 M/uL    Hemoglobin 14.6 12.0 - 16.0 g/dL    Hematocrit 43.5 37.0 - 47.0 %    MCV 92.6 81.4 - 97.8 fL    MCH 31.1 27.0 - 33.0 pg    MCHC 33.6 32.2 - 35.5 g/dL    RDW 51.1 (H) 35.9 - 50.0 fL    Platelet Count 448 (H) 164 - 446 K/uL    MPV 10.2 9.0 - 12.9 fL    Neutrophils-Polys 85.60 (H) 44.00 - 72.00 %    Lymphocytes 5.60 (L) 22.00 - 41.00 %    Monocytes 7.80 0.00 - 13.40 %    Eosinophils 0.10 0.00 - 6.90 %    Basophils 0.30 0.00 - 1.80 %    Immature Granulocytes 0.60 0.00 - 0.90 %    Nucleated RBC 0.00 0.00 - 0.20 /100 WBC    Neutrophils (Absolute) 18.50 (H) 1.82 - 7.42 K/uL    Lymphs (Absolute) 1.21 1.00 - 4.80 K/uL    Monos (Absolute) 1.68 (H) 0.00 - 0.85 K/uL    Eos (Absolute) 0.03 0.00 - 0.51 K/uL    Baso (Absolute) 0.06 0.00 - 0.12 K/uL    Immature Granulocytes (abs) 0.14 (H) 0.00 - 0.11 K/uL    NRBC (Absolute) 0.00 K/uL   COMP METABOLIC PANEL   Result Value Ref Range    Sodium 139 135 - 145 mmol/L    Potassium 3.8 3.6 - 5.5 mmol/L    Chloride 102 96 - 112 mmol/L    Co2 23 20 - 33 mmol/L    Anion Gap 14.0 7.0 - 16.0    Glucose 132 (H) 65 - 99 mg/dL    Bun 11 8 - 22 mg/dL    Creatinine 0.45 (L) 0.50 - 1.40 mg/dL    Calcium 9.3 8.5 - 10.5 mg/dL    Correct Calcium 9.5 8.5 - 10.5 mg/dL    AST(SGOT) 129 (H) 12 - 45 U/L    ALT(SGPT) 206 (H) 2 - 50 U/L    Alkaline Phosphatase 525 (H) 30 - 99 U/L    Total  Bilirubin 12.2 (H) 0.1 - 1.5 mg/dL    Albumin 3.7 3.2 - 4.9 g/dL    Total Protein 6.9 6.0 - 8.2 g/dL    Globulin 3.2 1.9 - 3.5 g/dL    A-G Ratio 1.2 g/dL   LIPASE   Result Value Ref Range    Lipase 1016 (H) 11 - 82 U/L   HCG QUAL SERUM   Result Value Ref Range    Beta-Hcg Qualitative Serum Negative Negative   ESTIMATED GFR   Result Value Ref Range    GFR (CKD-EPI) 120 >60 mL/min/1.73 m 2   Lactic Acid   Result Value Ref Range    Lactic Acid 1.5 0.5 - 2.0 mmol/L   Blood Culture - Draw one from central line and one from peripheral site    Specimen: Peripheral; Blood   Result Value Ref Range    Significant Indicator NEG     Source BLD     Site PERIPHERAL     Culture Result       No Growth  Note: Blood cultures are incubated for 5 days and  are monitored continuously.Positive blood cultures  are called to the RN and reported as soon as  they are identified.     Blood Culture - Draw one from central line and one from peripheral site    Specimen: Line; Blood   Result Value Ref Range    Significant Indicator NEG     Source BLD     Site Peripheral     Culture Result       No Growth  Note: Blood cultures are incubated for 5 days and  are monitored continuously.Positive blood cultures  are called to the RN and reported as soon as  they are identified.     EKG   Result Value Ref Range    Report       [facility] Emergency Dept.    Test Date:  2024  Pt Name:    PHU RICHARDS                  Department: ER  MRN:        [id number]                      Room:  Gender:     Female                       Technician: [id number]  :        1980                   Requested By:ER TRIAGE PROTOCOL  Order #:    [id number]                    Reading MD:    Measurements  Intervals                                Axis  Rate:       100                          P:          44  MS:         145                          QRS:        13  QRSD:       73                           T:          37  QT:         338  QTc:         436    Interpretive Statements  Sinus tachycardia  Abnormal R-wave progression, early transition  Compared to ECG 2023 12:54:45  Sinus rhythm no longer present  Possible ischemia no longer present     EKG   Result Value Ref Range    Report       [facility] Emergency Dept.    Test Date:  2024  Pt Name:    PHU RICHARDS                  Department: ER  MRN:        [id number]                      Room:  Gender:     Female                       Technician: [id number]  :        1980                   Requested By:ER TRIAGE PROTOCOL  Order #:    [id number]                    Reading MD:    Measurements  Intervals                                Axis  Rate:       100                          P:          44  IL:         145                          QRS:        13  QRSD:       73                           T:          37  QT:         338  QTc:        436    Interpretive Statements  Sinus tachycardia  Abnormal R-wave progression, early transition  Compared to ECG 2023 12:54:45  Sinus rhythm no longer present  Possible ischemia no longer present          CULTURES:   Results       Procedure Component Value Units Date/Time    Blood Culture - Draw one from central line and one from peripheral site [[id number]] Collected: 24 1545    Order Status: Completed Specimen: Blood from Peripheral Updated: 24     Significant Indicator NEG     Source BLD     Site PERIPHERAL     Culture Result No Growth  Note: Blood cultures are incubated for 5 days and  are monitored continuously.Positive blood cultures  are called to the RN and reported as soon as  they are identified.      Blood Culture - Draw one from central line and one from peripheral site [[id number]] Collected: 24 1524    Order Status: Completed Specimen: Blood from Line Updated: 24     Significant Indicator NEG     Source BLD     Site Peripheral     Culture Result No Growth  Note: Blood cultures are incubated for 5  days and  are monitored continuously.Positive blood cultures  are called to the RN and reported as soon as  they are identified.      Urinalysis [[id number]]     Order Status: Canceled Specimen: Urine     Urine Culture (New) [[id number]]     Order Status: Sent Specimen: Urine     URINALYSIS,CULTURE IF INDICATED [[id number]]     Order Status: Sent Specimen: Urine, Clean Catch             IMAGES:  US-RUQ   Final Result      1.  Dilated pancreatic and common bile duct. Recommend MRCP or ERCP for evaluation of distal obstruction or stenosis.   2.  Gallbladder wall thickening with sludge. No discrete cholelithiasis. Findings are equivocal for acute cholecystitis. Consider further evaluation with HIDA scan if indicated.   3.  Borderline dilated portal vein, concerning for portal hypertension.      DX-CHEST-PORTABLE (1 VIEW)   Final Result      No acute cardiac or pulmonary abnormalities are identified.      [id number]    (Results Pending)        CONSULTS:   Gastroenterology     ASSESSMENT/PLAN:   44 y.o. female admitted for:      Abdominal pain- (present on admission)  Assessment & Plan  Differential highly suspicious for acute cholangitis with biliary obstruction. Subjective reports of high fever with jaundice and abdominal pain. Labs with leukocytosis, transaminitis and elevated alk phos in the 500s and total bili to 12.2. RUQ US shows dilated pancreatic and common bile duct, gallbladder wall thickening with sludge but no discrete cholelithiasis (equivocal for cholecystitis). Lipase elevated to 1016. Positive Gonzalez sign on exam with scleral icterus and jaundice to face. S/p 1 dose of Zosyn in the ED.     Plan:  -GI NP Hernandez and Dr. James consulted, recommended MRCP tonight with likely ERCP tomorrow  -NPO at midnight  -Sepsis protocol as below   -Continue IV Zosyn q8h   -Pain and antiemetic regimen ordered   -Follow urinalysis, hepatitis panel, lipid profile   -Trend CBC, CMP   -Follow MRCP results  -Follow blood  cultures     Sepsis (HCC)- (present on admission)  Assessment & Plan  This is Sepsis Present on admission  SIRS criteria identified on my evaluation include: Tachycardia, with heart rate greater than 90 BPM, Tachypnea, with respirations greater than 20 per minute, and Leukocytosis, with WBC greater than 12,000  Clinical indicators of end organ dysfunction include Acute Liver Failure, with bilirubin greater than 2  Source is GI/cholangitis   Sepsis protocol initiated  Crystalloid Fluid Administration: Fluid resuscitation ordered per standard protocol - 30 mL/kg per current or ideal body weight + 125cc/hr mIVF   IV antibiotics as appropriate for source of sepsis  Blood cultures obtained     Leukocytosis- (present on admission)  Assessment & Plan  -Continue IV Zosyn  -Trend CBC  -Vital signs q4h, monitor for fevers  -Follow blood cultures     Transaminitis  Assessment & Plan  -Trend CMP  -Follow MRCP, hepatitis panel, lipid panel   -GI following     Lives in group home- (present on admission)  Assessment & Plan  Lives at [facility].   History of developmental delay, PD, and epilepsy[place]   Guardian not at bedside at the time of admission, no ACP docs in chart. Unable to contact.   Guardian/DPOA: Nay Trinidadight   [phone]     Seizure (HCC)- (present on admission)  Assessment & Plan  Continue home medications  Seizure precautions     Parkinson disease (HCC)- (present on admission)  Assessment & Plan  Continue home medications    Bipolar 1 disorder, depressed, moderate (HCC)- (present on admission)  Assessment & Plan  Continue home medications        Core Measures:  Fluids: 30 mL/kg bolus followed by 125 cc/h maintenance fluids  Lines: Peripheral IV for intravenous access  Abx: IV Zosyn every 8 hours  Diet: regular diet, n.p.o. at midnight  PPX: SCDs      CODE Status: Full Code      Vel Alvarado D.O.   PGY-2  [facility]

## 2024-08-29 LAB
ALBUMIN SERPL BCP-MCNC: 2.5 G/DL (ref 3.2–4.9)
ALBUMIN/GLOB SERPL: 0.9 G/DL
ALP SERPL-CCNC: 415 U/L (ref 30–99)
ALT SERPL-CCNC: 35 U/L (ref 2–50)
ANION GAP SERPL CALC-SCNC: 11 MMOL/L (ref 7–16)
AST SERPL-CCNC: 60 U/L (ref 12–45)
BASOPHILS # BLD AUTO: 0.8 % (ref 0–1.8)
BASOPHILS # BLD: 0.08 K/UL (ref 0–0.12)
BILIRUB SERPL-MCNC: 8.1 MG/DL (ref 0.1–1.5)
BUN SERPL-MCNC: 7 MG/DL (ref 8–22)
CALCIUM ALBUM COR SERPL-MCNC: 9.3 MG/DL (ref 8.5–10.5)
CALCIUM SERPL-MCNC: 8.1 MG/DL (ref 8.5–10.5)
CHLORIDE SERPL-SCNC: 105 MMOL/L (ref 96–112)
CO2 SERPL-SCNC: 22 MMOL/L (ref 20–33)
CREAT SERPL-MCNC: 0.27 MG/DL (ref 0.5–1.4)
EOSINOPHIL # BLD AUTO: 0.34 K/UL (ref 0–0.51)
EOSINOPHIL NFR BLD: 3.4 % (ref 0–6.9)
ERYTHROCYTE [DISTWIDTH] IN BLOOD BY AUTOMATED COUNT: 54 FL (ref 35.9–50)
GFR SERPLBLD CREATININE-BSD FMLA CKD-EPI: 136 ML/MIN/1.73 M 2
GLOBULIN SER CALC-MCNC: 2.7 G/DL (ref 1.9–3.5)
GLUCOSE SERPL-MCNC: 93 MG/DL (ref 65–99)
HCT VFR BLD AUTO: 35.3 % (ref 37–47)
HGB BLD-MCNC: 11.9 G/DL (ref 12–16)
IMM GRANULOCYTES # BLD AUTO: 0.06 K/UL (ref 0–0.11)
IMM GRANULOCYTES NFR BLD AUTO: 0.6 % (ref 0–0.9)
LIPASE SERPL-CCNC: 140 U/L (ref 11–82)
LYMPHOCYTES # BLD AUTO: 2.03 K/UL (ref 1–4.8)
LYMPHOCYTES NFR BLD: 20.1 % (ref 22–41)
MCH RBC QN AUTO: 31.2 PG (ref 27–33)
MCHC RBC AUTO-ENTMCNC: 33.7 G/DL (ref 32.2–35.5)
MCV RBC AUTO: 92.7 FL (ref 81.4–97.8)
MONOCYTES # BLD AUTO: 0.84 K/UL (ref 0–0.85)
MONOCYTES NFR BLD AUTO: 8.3 % (ref 0–13.4)
NEUTROPHILS # BLD AUTO: 6.74 K/UL (ref 1.82–7.42)
NEUTROPHILS NFR BLD: 66.8 % (ref 44–72)
NRBC # BLD AUTO: 0 K/UL
NRBC BLD-RTO: 0 /100 WBC (ref 0–0.2)
PLATELET # BLD AUTO: 314 K/UL (ref 164–446)
PMV BLD AUTO: 10.9 FL (ref 9–12.9)
POTASSIUM SERPL-SCNC: 4.2 MMOL/L (ref 3.6–5.5)
PROT SERPL-MCNC: 5.2 G/DL (ref 6–8.2)
RBC # BLD AUTO: 3.81 M/UL (ref 4.2–5.4)
SODIUM SERPL-SCNC: 138 MMOL/L (ref 135–145)
WBC # BLD AUTO: 10.1 K/UL (ref 4.8–10.8)

## 2024-08-29 PROCEDURE — 700102 HCHG RX REV CODE 250 W/ 637 OVERRIDE(OP)

## 2024-08-29 PROCEDURE — 80053 COMPREHEN METABOLIC PANEL: CPT

## 2024-08-29 PROCEDURE — 36415 COLL VENOUS BLD VENIPUNCTURE: CPT

## 2024-08-29 PROCEDURE — 700111 HCHG RX REV CODE 636 W/ 250 OVERRIDE (IP): Mod: JZ

## 2024-08-29 PROCEDURE — 83690 ASSAY OF LIPASE: CPT

## 2024-08-29 PROCEDURE — 99232 SBSQ HOSP IP/OBS MODERATE 35: CPT | Mod: GC | Performed by: FAMILY MEDICINE

## 2024-08-29 PROCEDURE — 99222 1ST HOSP IP/OBS MODERATE 55: CPT | Performed by: SURGERY

## 2024-08-29 PROCEDURE — A9270 NON-COVERED ITEM OR SERVICE: HCPCS

## 2024-08-29 PROCEDURE — 770020 HCHG ROOM/CARE - TELE (206)

## 2024-08-29 PROCEDURE — 85025 COMPLETE CBC W/AUTO DIFF WBC: CPT

## 2024-08-29 PROCEDURE — 700105 HCHG RX REV CODE 258

## 2024-08-29 RX ADMIN — CARBIDOPA AND LEVODOPA 1 TABLET: 25; 250 TABLET ORAL at 06:11

## 2024-08-29 RX ADMIN — PIPERACILLIN AND TAZOBACTAM 3.38 G: 3; .375 INJECTION, POWDER, FOR SOLUTION INTRAVENOUS at 12:10

## 2024-08-29 RX ADMIN — SODIUM CHLORIDE, POTASSIUM CHLORIDE, SODIUM LACTATE AND CALCIUM CHLORIDE: 600; 310; 30; 20 INJECTION, SOLUTION INTRAVENOUS at 16:19

## 2024-08-29 RX ADMIN — SENNOSIDES AND DOCUSATE SODIUM 2 TABLET: 50; 8.6 TABLET ORAL at 17:07

## 2024-08-29 RX ADMIN — LEVETIRACETAM 500 MG: 500 TABLET, FILM COATED ORAL at 12:08

## 2024-08-29 RX ADMIN — ZIPRASIDONE HYDROCHLORIDE 60 MG: 60 CAPSULE ORAL at 17:07

## 2024-08-29 RX ADMIN — SODIUM CHLORIDE, POTASSIUM CHLORIDE, SODIUM LACTATE AND CALCIUM CHLORIDE: 600; 310; 30; 20 INJECTION, SOLUTION INTRAVENOUS at 08:10

## 2024-08-29 RX ADMIN — LAMOTRIGINE 25 MG: 25 TABLET ORAL at 06:11

## 2024-08-29 RX ADMIN — Medication 100 MG: at 06:12

## 2024-08-29 RX ADMIN — CARBIDOPA AND LEVODOPA 1 TABLET: 25; 250 TABLET ORAL at 13:01

## 2024-08-29 RX ADMIN — PIPERACILLIN AND TAZOBACTAM 3.38 G: 3; .375 INJECTION, POWDER, FOR SOLUTION INTRAVENOUS at 21:45

## 2024-08-29 RX ADMIN — CARBIDOPA AND LEVODOPA 1 TABLET: 25; 250 TABLET ORAL at 21:44

## 2024-08-29 RX ADMIN — LEVETIRACETAM 500 MG: 500 TABLET, FILM COATED ORAL at 17:07

## 2024-08-29 RX ADMIN — PIPERACILLIN AND TAZOBACTAM 3.38 G: 3; .375 INJECTION, POWDER, FOR SOLUTION INTRAVENOUS at 06:17

## 2024-08-29 RX ADMIN — LEVETIRACETAM 500 MG: 500 TABLET, FILM COATED ORAL at 06:10

## 2024-08-29 ASSESSMENT — PAIN DESCRIPTION - PAIN TYPE
TYPE: ACUTE PAIN

## 2024-08-29 NOTE — CARE PLAN
The patient is Stable - Low risk of patient condition declining or worsening    Shift Goals  Clinical Goals: Abx, gen surg consult, VSS  Patient Goals: rest, comfort  Family Goals: estrellita    Progress made toward(s) clinical / shift goals:    Problem: Pain - Standard  Goal: Alleviation of pain or a reduction in pain to the patient’s comfort goal  Outcome: Progressing     Problem: Knowledge Deficit - Standard  Goal: Patient and family/care givers will demonstrate understanding of plan of care, disease process/condition, diagnostic tests and medications  Outcome: Progressing     Problem: Hemodynamics  Goal: Patient's hemodynamics, fluid balance and neurologic status will be stable or improve  Outcome: Progressing     Problem: Fluid Volume  Goal: Fluid volume balance will be maintained  Outcome: Progressing     Problem: Urinary - Renal Perfusion  Goal: Ability to achieve and maintain adequate renal perfusion and functioning will improve  Outcome: Progressing     Problem: Respiratory  Goal: Patient will achieve/maintain optimum respiratory ventilation and gas exchange  Outcome: Progressing     Problem: Mechanical Ventilation  Goal: Safe management of artificial airway and ventilation  Outcome: Progressing  Goal: Successful weaning off mechanical ventilator, spontaneously maintains adequate gas exchange  Outcome: Progressing  Goal: Patient will be able to express needs and understand communication  Outcome: Progressing     Problem: Physical Regulation  Goal: Diagnostic test results will improve  Outcome: Progressing  Goal: Signs and symptoms of infection will decrease  Outcome: Progressing     Problem: Fall Risk  Goal: Patient will remain free from falls  Outcome: Progressing     Problem: Skin Integrity  Goal: Skin integrity is maintained or improved  Outcome: Progressing       Patient is not progressing towards the following goals:

## 2024-08-29 NOTE — PROGRESS NOTES
Received bedside report from off going RN. Assumed patient care. VSS at this time. Patient on telemetry monitoring. Patient resting in bed. Call light in reach and fall precautions in place. Hourly rounding initiated.

## 2024-08-29 NOTE — CONSULTS
DATE OF CONSULTATION:  8/29/2024     REFERRING PHYSICIAN:   Sindi Moore M.D.     CONSULTING PHYSICIAN:  Chelsie Bhandari M.D.     REASON FOR CONSULTATION:  I have been asked by Dr. Moore to see the patient in surgical consultation for evaluation of gallstones and elevated liver function tests.    HISTORY OF PRESENT ILLNESS: The patient is a 44 year old woman with epilepsy, developmental delay, bipolar 1, who is a rasmussen of the [facility].  Her guardian is Mrs. Seals, at [phone].  She cannot give me much history but she says she has had abdominal pain for quite a while.  Per the chart she comes in for abdominal pain and jaundice.  Per report she had abdominal pain and fever as well as nausea and vomiting at her group home.  She was brought to the emergency department also for worsening jaundice.  A total bilirubin came up to about 12.  She has been seen by GI after she had a right upper quadrant ultrasound which showed dilated bile and pancreatic ducts as well as gallbladder sludge.  An MRCP was obtained which showed gallbladder sludge, a smaller common bile duct and no evidence of mass or retained choledocho with.  I have been consulted for cholecystectomy.    She currently denies any trouble with abdominal pain      PAST MEDICAL HISTORY:  has a past medical history of Anxiety, Depression, Diabetes, Epilepsy (HCC), Mental disability, Sleep apnea, Snoring, and Unspecified urinary incontinence.    PAST SURGICAL HISTORY:  has a past surgical history that includes lesion excision general (2/20/2014).    ALLERGIES:   Allergies   Allergen Reactions    Acetaminophen      Unknown per patient and caregiver    Other Misc      bees    Penicillins Hives     Per patien    Shellfish Allergy Hives     Get hives per patient       CURRENT MEDICATIONS:    Home Medications       Reviewed by Jenna Horner R.N. (Registered Nurse) on 08/27/24 at 1240  Med List Status: Partial     Medication Last Dose Status    carbidopa-levodopa (SINEMET)  MG Tab  Active   cetirizine (ZYRTEC) 10 MG Tab  Active   Incontinence Supply Disposable (PREVAIL SUPER PLUS XLARGE) Misc  Active   lamoTRIgine (LAMICTAL) 25 MG Tab  Active   levETIRAcetam (KEPPRA) 500 MG Tab  Active   metFORMIN ER (GLUCOPHAGE XR) 500 MG TABLET SR 24 HR  Active   Multiple Vitamins-Minerals (MULTIVITAMIN ADULT, MINERALS,) Tab  Active   permethrin (ELIMITE) 5 % Cream  Active   TRUE VITAMIN B1 100 MG Tab  Active   ziprasidone (GEODON) 60 MG Cap  Active                  Audit from Redirected Encounters    **Home medications have not yet been reviewed for this encounter**         FAMILY HISTORY: family history is not on file.    SOCIAL HISTORY:  reports that she has quit smoking. Her smoking use included cigarettes. She has never used smokeless tobacco. She reports that she does not drink alcohol and does not use drugs.    REVIEW OF SYSTEMS: Review of systems is remarkable for the following no abdominal pain. The remainder of the comprehensive ROS is negative with the exception of the aforementioned HPI, PMH, and PSH bullets in accordance with CMS guideline.    PHYSICAL EXAMINATION:    Physical Exam  Constitutional:       Appearance: Normal appearance.      Comments: Severe jaundice   HENT:      Head: Normocephalic and atraumatic.      Right Ear: External ear normal.      Left Ear: External ear normal.      Nose: Nose normal.      Mouth/Throat:      Mouth: Mucous membranes are dry.   Eyes:      General: Scleral icterus present.      Extraocular Movements: Extraocular movements intact.   Cardiovascular:      Rate and Rhythm: Normal rate and regular rhythm.   Pulmonary:      Effort: Pulmonary effort is normal.   Abdominal:      General: Abdomen is flat. There is no distension.      Tenderness: There is no abdominal tenderness.   Musculoskeletal:         General: Normal range of motion.   Skin:     General: Skin is warm and dry.      Capillary Refill: Capillary refill  takes less than 2 seconds.   Neurological:      General: No focal deficit present.      Mental Status: She is alert and oriented to person, place, and time.   Psychiatric:         Mood and Affect: Mood normal.         Behavior: Behavior normal.         LABORATORY VALUES:   Recent Labs     08/27/24  1328 08/28/24  0814 08/29/24  0346   WBC 21.6* 14.8* 10.1   RBC 4.70 4.03* 3.81*   HEMOGLOBIN 14.6 12.4 11.9*   HEMATOCRIT 43.5 37.7 35.3*   MCV 92.6 93.5 92.7   MCH 31.1 30.8 31.2   MCHC 33.6 32.9 33.7   RDW 51.1* 53.2* 54.0*   PLATELETCT 448* 334 314   MPV 10.2 10.9 10.9     Recent Labs     08/27/24  1328 08/28/24  1121 08/29/24  0346   SODIUM 139 138 138   POTASSIUM 3.8 4.1 4.2   CHLORIDE 102 104 105   CO2 23 24 22   GLUCOSE 132* 90 93   BUN 11 7* 7*   CREATININE 0.45* 0.27* 0.27*   CALCIUM 9.3 8.7 8.1*     Recent Labs     08/27/24  1328 08/28/24  0814 08/28/24  1121 08/29/24  0346   ASTSGOT 129*  --  87* 60*   ALTSGPT 206*  --  51* 35   TBILIRUBIN 12.2*  --  12.0* 8.1*   ALKPHOSPHAT 525*  --  467* 415*   GLOBULIN 3.2  --  2.8 2.7   INR  --  1.16*  --   --      Recent Labs     08/28/24  0814   INR 1.16*        IMAGING:   [id number]   Final Result      1.  Gallbladder contracted containing some sludge no gallstones.      2.  Common bile that measures 6.7 mm in diameter. No filling defects or strictures are currently identified. Mild central intrahepatic ductal dilatation.      3.  No pancreatic ductal dilatation or abnormality.      4.  Incidentally noted 1.5 cm left adrenal mass. Most likely adrenal adenoma. Follow-up CT could be obtained in 12 months.      DX-SKULL-LIMITED 3-   Final Result      Unremarkable exam.      [id number] VIEW   Final Result      1.  Mild constipation.      US-RUQ   Final Result      1.  Dilated pancreatic and common bile duct. Recommend MRCP or ERCP for evaluation of distal obstruction or stenosis.   2.  Gallbladder wall thickening with sludge. No discrete cholelithiasis. Findings are  equivocal for acute cholecystitis. Consider further evaluation with HIDA scan if indicated.   3.  Borderline dilated portal vein, concerning for portal hypertension.      DX-CHEST-PORTABLE (1 VIEW)   Final Result      No acute cardiac or pulmonary abnormalities are identified.      My review of the images there are dilated pancreatic and common bile duct on the right upper ultrasound which has improved on her MRCP.  There is minimal gallbladder wall thickening to 4 mm on her ultrasound.  I am having a difficult time seeing her gallbladder on her MRCP, but her pancreatic ductal dilation has resolved and her common bile duct dilation has improved.    ASSESSMENT AND PLAN:   44-year-old woman with gallbladder sludge and minimal gallbladder wall thickening, as well as severe transaminitis and elevated bilirubin.  No evidence of retained choledocholithiasis on MRCP    The patient is unable to consent for herself and has a guardian through the state.  I have called and left a message to discuss the procedure with her guardian.  There was no answer and I left a message with my office number to call me back.  We will be unable to proceed with surgery until we have consent from patient's guardian.      DISPOSITION: Medical evaluation and admission. The patient was admitted to the Medical Service prior to surgical consultation. [facility] will follow.     ____________________________________     Chelsie Bhandari M.D.    DD: 8/29/2024  10:53 AM    AAST Grading System for EGS Conditions  ACS NSQIP Surgical Risk Calculator

## 2024-08-29 NOTE — CARE PLAN
The patient is Watcher - Medium risk of patient condition declining or worsening    Shift Goals  Clinical Goals: Hemodynamics  Patient Goals: Rest  Family Goals: NIKA    Progress made toward(s) clinical / shift goals:    Problem: Pain - Standard  Goal: Alleviation of pain or a reduction in pain to the patient’s comfort goal  Description: Target End Date:  Prior to discharge or change in level of care    Document on Vitals flowsheet    1.  Document pain using the appropriate pain scale per order or unit policy  2.  Educate and implement non-pharmacologic comfort measures (i.e. relaxation, distraction, massage, cold/heat therapy, etc.)  3.  Pain management medications as ordered  4.  Reassess pain after pain med administration per policy  5.  If opiods administered assess patient's response to pain medication is appropriate per POSS sedation scale  6.  Follow pain management plan developed in collaboration with patient and interdisciplinary team (including palliative care or pain specialists if applicable)  Outcome: Progressing  Note: Pain frequently assessed. No complaints of pain     Problem: Hemodynamics  Goal: Patient's hemodynamics, fluid balance and neurologic status will be stable or improve  Description: Target End Date:  Prior to discharge or change in level of care    Document on Assessment and I/O flowsheet templates    1.  Monitor vital signs, pulse oximetry and cardiac monitor per provider order and/or policy  2.  Maintain blood pressure per provider order  3.  Hemodynamic monitoring per provider order  4.  Manage IV fluids and IV infusions  5.  Monitor intake and output  6.  Daily weights per unit policy or provider order  7.  Assess peripheral pulses and capillary refill  8.  Assess color and body temperature  9.  Position patient for maximum circulation/cardiac output  10. Monitor for signs/symptoms of excessive bleeding  11. Assess mental status, restlessness and changes in level of consciousness  12.  Monitor temperature and report fever or hypothermia to provider immediately. Consideration of targeted temperature management.  Outcome: Progressing  Note: SR on monitor, MAP > 65, good uop

## 2024-08-29 NOTE — THERAPY
Occupational Therapy Contact Note    Patient Name: Erin Santiago  Age:  44 y.o., Sex:  female  Medical Record #: [id number]  Today's Date: 8/29/2024    Discussed missed therapy with RN     08/29/24 1105   Initial Contact Note    Initial Contact Note Order Received and Verified, Occupational Therapy Evaluation in Progress with Full Report to Follow.   Interdisciplinary Plan of Care Collaboration   IDT Collaboration with  Nursing  (EMR)   Collaboration Comments OT orders received. Per notes, pt pending surgical intervention; awaiting guardian consent. Will hold OT eval and re-attempt as appropriate/able.   Session Information   Date / Session Number  8/29 Hold - needs eval

## 2024-08-29 NOTE — PROGRESS NOTES
[facility] MEDICINE PROGRESS NOTE        Attending:   Tk Kahn MD    Resident:   Sindi Moore D.O.    PATIENT:   Erin Santiago; [id number]; 1980    ID:   44 y.o. female admitted for sepsis in the setting of suspected cholangitis     SUBJECTIVE:   No acute events overnight, pt reports improving abdominal pain. Remains afebrile.     OBJECTIVE:  Vitals:    08/28/24 2155 08/29/24 0021 08/29/24 0500 08/29/24 0756   BP: 105/68 94/61 122/77 112/74   Pulse: 85 77 71 70   Resp:  16 15 18   Temp:  36.4 °C (97.5 °F) 36.4 °C (97.6 °F) 36.5 °C (97.7 °F)   TempSrc:  Temporal Temporal Temporal   SpO2:    93%   Weight:       Height:           Intake/Output Summary (Last 24 hours) at 8/29/2024 0644  Last data filed at 8/28/2024 2154  Gross per 24 hour   Intake 1684.01 ml   Output 1300 ml   Net 384.01 ml       PHYSICAL EXAM:  General: No acute distress, afebrile, resting comfortably  HEENT: NC/AT. EOMI.   Cardiovascular: RRR without murmurs. Normal capillary refill   Respiratory: CTAB  Abdomen: soft, mild tenderness throughout abdomen, nondistended, no masses  EXT:  BLAKE, no edema  Skin: Jaundiced, No erythema/lesions   Neuro: Non-focal    LABS:  Recent Labs     08/27/24  1328 08/28/24  0814 08/29/24  0346   WBC 21.6* 14.8* 10.1   RBC 4.70 4.03* 3.81*   HEMOGLOBIN 14.6 12.4 11.9*   HEMATOCRIT 43.5 37.7 35.3*   MCV 92.6 93.5 92.7   MCH 31.1 30.8 31.2   RDW 51.1* 53.2* 54.0*   PLATELETCT 448* 334 314   MPV 10.2 10.9 10.9   NEUTSPOLYS 85.60* 80.90* 66.80   LYMPHOCYTES 5.60* 8.30* 20.10*   MONOCYTES 7.80 9.00 8.30   EOSINOPHILS 0.10 1.00 3.40   BASOPHILS 0.30 0.20 0.80     Recent Labs     08/27/24  1328 08/28/24  1121 08/29/24  0346   SODIUM 139 138 138   POTASSIUM 3.8 4.1 4.2   CHLORIDE 102 104 105   CO2 23 24 22   BUN 11 7* 7*   CREATININE 0.45* 0.27* 0.27*   CALCIUM 9.3 8.7 8.1*   ALBUMIN 3.7 2.8* 2.5*     Estimated GFR/CRCL = Estimated Creatinine Clearance: 261.8 mL/min (A) (by C-G formula based on SCr of 0.27  mg/dL (L)).  Recent Labs     08/27/24  1328 08/28/24  1121 08/29/24  0346   GLUCOSE 132* 90 93     Recent Labs     08/27/24  1328 08/28/24  0814 08/28/24  1121 08/29/24  0346   ASTSGOT 129*  --  87* 60*   ALTSGPT 206*  --  51* 35   TBILIRUBIN 12.2*  --  12.0* 8.1*   ALKPHOSPHAT 525*  --  467* 415*   GLOBULIN 3.2  --  2.8 2.7   INR  --  1.16*  --   --              Recent Labs     08/28/24  0814   INR 1.16*         IMAGING:  [id number]-M/O   Final Result      1.  Gallbladder contracted containing some sludge no gallstones.      2.  Common bile that measures 6.7 mm in diameter. No filling defects or strictures are currently identified. Mild central intrahepatic ductal dilatation.      3.  No pancreatic ductal dilatation or abnormality.      4.  Incidentally noted 1.5 cm left adrenal mass. Most likely adrenal adenoma. Follow-up CT could be obtained in 12 months.      DX-SKULL-LIMITED 3-   Final Result      Unremarkable exam.      [id number] VIEW   Final Result      1.  Mild constipation.      US-RUQ   Final Result      1.  Dilated pancreatic and common bile duct. Recommend MRCP or ERCP for evaluation of distal obstruction or stenosis.   2.  Gallbladder wall thickening with sludge. No discrete cholelithiasis. Findings are equivocal for acute cholecystitis. Consider further evaluation with HIDA scan if indicated.   3.  Borderline dilated portal vein, concerning for portal hypertension.      DX-CHEST-PORTABLE (1 VIEW)   Final Result      No acute cardiac or pulmonary abnormalities are identified.          MEDS:  Current Facility-Administered Medications   Medication Last Admin    lactated ringers infusion New Bag at 08/28/24 1647    Pharmacy Consult Request ...Pain Management Review 1 Each      oxyCODONE immediate-release (Roxicodone) tablet 2.5 mg      Or    oxyCODONE immediate-release (Roxicodone) tablet 5 mg 5 mg at 08/28/24 0248    Or    HYDROmorphone (Dilaudid) injection 0.25 mg      ondansetron (Zofran)  syringe/vial injection 4 mg      ondansetron (Zofran ODT) dispertab 4 mg      promethazine (Phenergan) tablet 12.5-25 mg      promethazine (Phenergan) suppository 12.5-25 mg      prochlorperazine (Compazine) injection 5-10 mg      senna-docusate (Pericolace Or Senokot S) 8.6-50 MG per tablet 2 Tablet 2 Tablet at 08/27/24 1853    And    polyethylene glycol/lytes (Miralax) Packet 1 Packet      piperacillin-tazobactam (Zosyn) 3.375 g in  mL IVPB 3.375 g at 08/29/24 0617    carbidopa-levodopa (Sinemet)  MG tablet 1 Tablet 1 Tablet at 08/29/24 0611    lamoTRIgine (LaMICtal) tablet 25 mg 25 mg at 08/29/24 0611    levETIRAcetam (Keppra) tablet 500 mg 500 mg at 08/29/24 0610    ziprasidone (Geodon) capsule 60 mg 60 mg at 08/28/24 1814    thiamine (Vitamin B-1) tablet 100 mg 100 mg at 08/29/24 0612       ASSESSMENT/PLAN:    * Abdominal pain- (present on admission)  Assessment & Plan  MRCP no signs of choledocholithiasis but did show sludge in gallbladder which is consistent with RUQ US. GI signed off as no indication for ERCP. markedly downtrending liver enzymes as well as bilirubin.  Lipase normalizing.  CBC with white count downtrending.    Plan:  -General surgery consulted for possible cholecystectomy who agrees to see patient, appreciate recs  -NPO pending pending potential cholecystectomy  -Continue IV Zosyn q8h   -Pain and antiemetic regimen ordered   -Follow blood cultures     Transaminitis  Assessment & Plan  Markedly downtrending transaminitis, as well as downtrending hyperbilirubinemia and lipase.  Patient likely passed a stone blocking pancreatic duct.    -General Surgery has been consulted for possible cholecystectomy, appreciate further recs  -Patient remains n.p.o. for potential cholecystectomy today    Sepsis (HCC)- (present on admission)  Assessment & Plan  This is Sepsis Present on admission  SIRS criteria identified on my evaluation include: Tachycardia, with heart rate greater than 90 BPM,  Tachypnea, with respirations greater than 20 per minute, and Leukocytosis, with WBC greater than 12,000 (downtrend ing and vitals now stable)   Clinical indicators of end organ dysfunction include Acute Liver Failure, with bilirubin greater than 2  Source is GI/cholangitis   Sepsis protocol initiated  Crystalloid Fluid Administration: Fluid resuscitation ordered per standard protocol - 30 mL/kg per current or ideal body weight + 125cc/hr mIVF   IV antibiotics as appropriate for source of sepsis  Blood cultures obtained, NGTD  White count is downtrending, will continue IV antibiotics at this time    Leukocytosis- (present on admission)  Assessment & Plan  Elevated to 21 on admission, now downtrending with IV antibiotics    -Continue IV Zosyn  -Vital signs q4h, monitor for fevers  -Follow blood cultures, NGTD    Lives in group home- (present on admission)  Assessment & Plan  Lives at [facility].   History of developmental delay, PD, and epilepsy. [place] of [place].   Guardian not at bedside at the time of admission, no ACP docs in chart. Unable to contact.   Guardian/DPOA: Nay Seals   [phone]     Seizure (HCC)- (present on admission)  Assessment & Plan  Continue home medications  Seizure precautions     Parkinson disease (HCC)- (present on admission)  Assessment & Plan  Continue home medications    Bipolar 1 disorder, depressed, moderate (HCC)- (present on admission)  Assessment & Plan  Continue home medications         Core Measures  Fluids: 125 cc/h maintenance fluids  Lines: Peripheral IV for intravenous access  Abx: IV Zosyn every 8 hours  Diet: NPO pending MR and possible cholecystectomy  PPX: SCDs     CODE Status: Full Code     Sinid Moore,   PGY-2, [facility]

## 2024-08-29 NOTE — PROGRESS NOTES
Discussed situation at length with mitzi Morrison at [phone].   We discussed the reason for her hospitalization (abdominal pain, nausea, vomiting, jaundice), that she does not have signs of choledocholithiasis or mass so this is likely 2/2 cholelithiasis. I recommended laparoscopic cholecystectomy. We discussed risks, benefits, and alternatives with risks, including, but not limited to, bleeding, infection, damage to surrounding structures such as small or large intestine/pancreas/liver, damage to the common bile duct possibly requiring hepaticojejunostomy, possible cystic duct stump leak requiring further procedures, hernia, need for an open procedure.     Erin has a higher risk than typical for an open procedure due to likely severe inflammation in the area and the contracted gallbladder on imaging. There is even some concern for a Mirizzi's syndrome noted by the GI doctor, which would require assistance from hepatobiliary.     We discussed that if she did not have this surgery she would continue to have episodes like this and that it could be fatal.     We discussed the alternative of a cholecystostomy tube, and that this would only be a temporary measured.     All questions were answered to the patient's decision maker's apparent satisfaction and they agreed to proceed.      If the surgery proceeds on a weekday 8AM-5PM call Tri Morrison at [phone].   If the surgery proceeds after hours or on a weekend, call [phone] for the on call who will have access to the case notes.        ____________________________________     Chelsie Bhandari M.D.    DD: 8/29/2024  2:37 PM

## 2024-08-29 NOTE — PROGRESS NOTES
Received phone call from [facility] Public Guardian office. Primary guardian, Nay Seals, is out of office.  Tri Morrison is guardian for patient.     Tri Morrison [phone]    24 Hour Emergency Line [phone]

## 2024-08-29 NOTE — WOUND TEAM
[facility]  Inpatient Services  Wound and Skin Care Brief Evaluation    Admission Date: 8/27/2024     Last order of IP CONSULT TO WOUND CARE was found on 8/28/2024 from Hospital Encounter on 8/27/2024     HPI, PMH, SH: Reviewed    Chief Complaint   Patient presents with    Abdominal Pain     Mid upper abd pain x 2 weeks.     Nausea    Jaundice     Caregiver reports patient has been yellow in color since yesterday.      Diagnosis: Cholangitis [K83.09]    Unit where seen by Wound Team: T725/01     Wound consult placed regarding wound to upper back. Chart and images reviewed. This clinician in to assess patient. Patient pleasant and agreeable. Patient with excoriation to upper back, left open to air. Sacrum and heels, clean, dry, no open wounds, blanching.     No pressure injuries or advanced wound care needs identified. Wound consult completed. No further follow up unless indicated and consulted.          PREVENTATIVE INTERVENTIONS:    Q shift Kee - performed per nursing policy  Q shift pressure point assessments - performed per nursing policy    Surface/Positioning  Standard/trauma mattress - Currently in Place  TAPs Turning system - Currently in Place    Offloading/Redistribution  Heel offloading dressing (Silicone dressing) - Currently in Place      Mobilization      Up to chair

## 2024-08-29 NOTE — CARE PLAN
The patient is Stable - Low risk of patient condition declining or worsening    Shift Goals  Clinical Goals: abx therapy, comfort, rest  Patient Goals: sleep, comfort  Family Goals: NIKA    Progress made toward(s) clinical / shift goals:      Problem: Hemodynamics  Goal: Patient's hemodynamics, fluid balance and neurologic status will be stable or improve  Outcome: Progressing     Problem: Urinary - Renal Perfusion  Goal: Ability to achieve and maintain adequate renal perfusion and functioning will improve  Outcome: Progressing     Problem: Fall Risk  Goal: Patient will remain free from falls  Outcome: Progressing       Patient is not progressing towards the following goals:

## 2024-08-30 ENCOUNTER — APPOINTMENT (OUTPATIENT)
Dept: RADIOLOGY | Facility: MEDICAL CENTER | Age: 44
DRG: 871 | End: 2024-08-30
Attending: SURGERY
Payer: MEDICARE

## 2024-08-30 LAB
ALBUMIN SERPL BCP-MCNC: 2.7 G/DL (ref 3.2–4.9)
ALBUMIN/GLOB SERPL: 1 G/DL
ALP SERPL-CCNC: 404 U/L (ref 30–99)
ALT SERPL-CCNC: 26 U/L (ref 2–50)
ANION GAP SERPL CALC-SCNC: 10 MMOL/L (ref 7–16)
AST SERPL-CCNC: 31 U/L (ref 12–45)
BACTERIA UR CULT: NORMAL
BASOPHILS # BLD AUTO: 0.8 % (ref 0–1.8)
BASOPHILS # BLD: 0.08 K/UL (ref 0–0.12)
BILIRUB SERPL-MCNC: 3.9 MG/DL (ref 0.1–1.5)
BUN SERPL-MCNC: 5 MG/DL (ref 8–22)
CALCIUM ALBUM COR SERPL-MCNC: 8.9 MG/DL (ref 8.5–10.5)
CALCIUM SERPL-MCNC: 7.9 MG/DL (ref 8.5–10.5)
CHLORIDE SERPL-SCNC: 105 MMOL/L (ref 96–112)
CO2 SERPL-SCNC: 23 MMOL/L (ref 20–33)
CREAT SERPL-MCNC: 0.4 MG/DL (ref 0.5–1.4)
EOSINOPHIL # BLD AUTO: 0.37 K/UL (ref 0–0.51)
EOSINOPHIL NFR BLD: 3.7 % (ref 0–6.9)
ERYTHROCYTE [DISTWIDTH] IN BLOOD BY AUTOMATED COUNT: 53.3 FL (ref 35.9–50)
GFR SERPLBLD CREATININE-BSD FMLA CKD-EPI: 123 ML/MIN/1.73 M 2
GLOBULIN SER CALC-MCNC: 2.6 G/DL (ref 1.9–3.5)
GLUCOSE SERPL-MCNC: 94 MG/DL (ref 65–99)
HCT VFR BLD AUTO: 35.9 % (ref 37–47)
HGB BLD-MCNC: 12.2 G/DL (ref 12–16)
IMM GRANULOCYTES # BLD AUTO: 0.08 K/UL (ref 0–0.11)
IMM GRANULOCYTES NFR BLD AUTO: 0.8 % (ref 0–0.9)
LYMPHOCYTES # BLD AUTO: 2.28 K/UL (ref 1–4.8)
LYMPHOCYTES NFR BLD: 23.1 % (ref 22–41)
MCH RBC QN AUTO: 31.3 PG (ref 27–33)
MCHC RBC AUTO-ENTMCNC: 34 G/DL (ref 32.2–35.5)
MCV RBC AUTO: 92.1 FL (ref 81.4–97.8)
MONOCYTES # BLD AUTO: 0.85 K/UL (ref 0–0.85)
MONOCYTES NFR BLD AUTO: 8.6 % (ref 0–13.4)
NEUTROPHILS # BLD AUTO: 6.23 K/UL (ref 1.82–7.42)
NEUTROPHILS NFR BLD: 63 % (ref 44–72)
NRBC # BLD AUTO: 0 K/UL
NRBC BLD-RTO: 0 /100 WBC (ref 0–0.2)
PLATELET # BLD AUTO: 383 K/UL (ref 164–446)
PMV BLD AUTO: 10.4 FL (ref 9–12.9)
POTASSIUM SERPL-SCNC: 4.1 MMOL/L (ref 3.6–5.5)
PROT SERPL-MCNC: 5.3 G/DL (ref 6–8.2)
RBC # BLD AUTO: 3.9 M/UL (ref 4.2–5.4)
SIGNIFICANT IND 70042: NORMAL
SITE SITE: NORMAL
SODIUM SERPL-SCNC: 138 MMOL/L (ref 135–145)
SOURCE SOURCE: NORMAL
WBC # BLD AUTO: 9.9 K/UL (ref 4.8–10.8)

## 2024-08-30 PROCEDURE — 80053 COMPREHEN METABOLIC PANEL: CPT

## 2024-08-30 PROCEDURE — 99232 SBSQ HOSP IP/OBS MODERATE 35: CPT | Mod: GC | Performed by: FAMILY MEDICINE

## 2024-08-30 PROCEDURE — 770020 HCHG ROOM/CARE - TELE (206)

## 2024-08-30 PROCEDURE — 700105 HCHG RX REV CODE 258

## 2024-08-30 PROCEDURE — 85025 COMPLETE CBC W/AUTO DIFF WBC: CPT

## 2024-08-30 PROCEDURE — 99231 SBSQ HOSP IP/OBS SF/LOW 25: CPT

## 2024-08-30 PROCEDURE — 700111 HCHG RX REV CODE 636 W/ 250 OVERRIDE (IP): Mod: JZ

## 2024-08-30 PROCEDURE — A9270 NON-COVERED ITEM OR SERVICE: HCPCS

## 2024-08-30 PROCEDURE — 700101 HCHG RX REV CODE 250

## 2024-08-30 PROCEDURE — 36415 COLL VENOUS BLD VENIPUNCTURE: CPT

## 2024-08-30 PROCEDURE — A9537 TC99M MEBROFENIN: HCPCS

## 2024-08-30 PROCEDURE — 700102 HCHG RX REV CODE 250 W/ 637 OVERRIDE(OP)

## 2024-08-30 PROCEDURE — 700111 HCHG RX REV CODE 636 W/ 250 OVERRIDE (IP)

## 2024-08-30 RX ORDER — MORPHINE SULFATE 4 MG/ML
INJECTION INTRAVENOUS
Status: COMPLETED
Start: 2024-08-30 | End: 2024-08-30

## 2024-08-30 RX ORDER — MORPHINE SULFATE 4 MG/ML
3 INJECTION INTRAVENOUS ONCE
Status: COMPLETED | OUTPATIENT
Start: 2024-08-30 | End: 2024-08-30

## 2024-08-30 RX ADMIN — PIPERACILLIN AND TAZOBACTAM 3.38 G: 3; .375 INJECTION, POWDER, FOR SOLUTION INTRAVENOUS at 05:29

## 2024-08-30 RX ADMIN — CARBIDOPA AND LEVODOPA 1 TABLET: 25; 250 TABLET ORAL at 20:01

## 2024-08-30 RX ADMIN — LAMOTRIGINE 25 MG: 25 TABLET ORAL at 06:52

## 2024-08-30 RX ADMIN — ZIPRASIDONE HYDROCHLORIDE 60 MG: 60 CAPSULE ORAL at 17:40

## 2024-08-30 RX ADMIN — SENNOSIDES AND DOCUSATE SODIUM 2 TABLET: 50; 8.6 TABLET ORAL at 17:40

## 2024-08-30 RX ADMIN — LEVETIRACETAM 500 MG: 500 TABLET, FILM COATED ORAL at 05:30

## 2024-08-30 RX ADMIN — CEFTRIAXONE SODIUM 2000 MG: 10 INJECTION, POWDER, FOR SOLUTION INTRAVENOUS at 14:21

## 2024-08-30 RX ADMIN — SODIUM CHLORIDE, POTASSIUM CHLORIDE, SODIUM LACTATE AND CALCIUM CHLORIDE: 600; 310; 30; 20 INJECTION, SOLUTION INTRAVENOUS at 10:01

## 2024-08-30 RX ADMIN — CARBIDOPA AND LEVODOPA 1 TABLET: 25; 250 TABLET ORAL at 14:19

## 2024-08-30 RX ADMIN — CARBIDOPA AND LEVODOPA 1 TABLET: 25; 250 TABLET ORAL at 05:30

## 2024-08-30 RX ADMIN — SODIUM CHLORIDE, POTASSIUM CHLORIDE, SODIUM LACTATE AND CALCIUM CHLORIDE: 600; 310; 30; 20 INJECTION, SOLUTION INTRAVENOUS at 23:09

## 2024-08-30 RX ADMIN — LEVETIRACETAM 500 MG: 500 TABLET, FILM COATED ORAL at 17:40

## 2024-08-30 RX ADMIN — Medication 100 MG: at 05:30

## 2024-08-30 RX ADMIN — LEVETIRACETAM 500 MG: 500 TABLET, FILM COATED ORAL at 14:19

## 2024-08-30 RX ADMIN — MORPHINE SULFATE 3 MG: 4 INJECTION INTRAVENOUS at 13:32

## 2024-08-30 RX ADMIN — MORPHINE SULFATE 3 MG: 4 INJECTION, SOLUTION INTRAMUSCULAR; INTRAVENOUS at 13:32

## 2024-08-30 ASSESSMENT — FIBROSIS 4 INDEX: FIB4 SCORE: 0.73

## 2024-08-30 ASSESSMENT — PAIN DESCRIPTION - PAIN TYPE
TYPE: ACUTE PAIN
TYPE: ACUTE PAIN

## 2024-08-30 NOTE — PROGRESS NOTES
"  DATE: 8/30/2024    Hospital Day 3  abdominal pain, nausea, & jaundice .    INTERVAL EVENTS:  Minimal jaundice on exam.  Normal Tbili & LFTs.  No abdominal pain or abdominal tenderness.    REVIEW OF SYSTEMS:  Comprehensive review of systems is negative with the exception of the aforementioned HPI, PMH, and PSH bullets in accordance with CMS guidelines.    PHYSICAL EXAMINATION:  Vital Signs: /76   Pulse 70   Temp 36.5 °C (97.7 °F) (Temporal)   Resp 18   Ht 1.549 m (5' 1\")   Wt 91 kg (200 lb 9.9 oz)   SpO2 93%     Physical Exam  Constitutional:       General: She is not in acute distress.     Appearance: She is not toxic-appearing or diaphoretic.   Eyes:      General: Scleral icterus (mild) present.   Pulmonary:      Effort: Pulmonary effort is normal. No respiratory distress.   Abdominal:      General: There is no distension.      Palpations: Abdomen is soft.      Tenderness: There is no abdominal tenderness. There is no guarding. Negative signs include Gonzalez's sign.   Skin:     General: Skin is warm and dry.      Coloration: Skin is jaundiced (mild).   Neurological:      Mental Status: She is alert.       LABORATORY VALUES:  Recent Labs     08/28/24  0814 08/29/24  0346 08/30/24  0154   WBC 14.8* 10.1 9.9   RBC 4.03* 3.81* 3.90*   HEMOGLOBIN 12.4 11.9* 12.2   HEMATOCRIT 37.7 35.3* 35.9*   MCV 93.5 92.7 92.1   MCH 30.8 31.2 31.3   MCHC 32.9 33.7 34.0   RDW 53.2* 54.0* 53.3*   PLATELETCT 334 314 383   MPV 10.9 10.9 10.4     Recent Labs     08/28/24  1121 08/29/24  0346 08/30/24  0154   SODIUM 138 138 138   POTASSIUM 4.1 4.2 4.1   CHLORIDE 104 105 105   CO2 24 22 23   GLUCOSE 90 93 94   BUN 7* 7* 5*   CREATININE 0.27* 0.27* 0.40*   CALCIUM 8.7 8.1* 7.9*     Recent Labs     08/28/24  0814 08/28/24  1121 08/29/24  0346 08/30/24  0154   ASTSGOT  --  87* 60* 31   ALTSGPT  --  51* 35 26   TBILIRUBIN  --  12.0* 8.1* 3.9*   ALKPHOSPHAT  --  467* 415* 404*   GLOBULIN  --  2.8 2.7 2.6   INR 1.16*  --   --   --  "     Recent Labs     08/28/24  0814   INR 1.16*       ASSESSMENT AND PLAN:  Abdominal pain with low suspicions for gallbladder source at this time as the patient has normal LFTs, a normal Tbili, no leukocytosis and is clinically asymptomatic. HIDA scan ordered for further evaluation. If no evidence of cholecystitis or severe gallbladder dysfunction, no further indications for surgical intervention.    [facility] will continue to follow.        ____________________________________     JUAN C Rice.

## 2024-08-30 NOTE — PROGRESS NOTES
[facility] [place] [place]        Attending:   Tk Kahn MD    Resident:   Ibrhaima Flores MD    PATIENT:   Erin Santiago; [id number]; 1980    ID:   44 y.o. female admitted for sepsis in the setting of suspected cholangitis     SUBJECTIVE:   No acute events overnight, patient had no acute complaints this morning.  Has been afebrile.    OBJECTIVE:  Vitals:    08/29/24 2327 08/30/24 0302 08/30/24 0556 08/30/24 0736   BP: 104/65 108/67  125/76   Pulse: 75 66  70   Resp: 16 18  18   Temp: 36.2 °C (97.2 °F) 36.2 °C (97.2 °F)  36.5 °C (97.7 °F)   TempSrc: Temporal Temporal  Temporal   SpO2: 93% 95%  93%   Weight:   91 kg (200 lb 9.9 oz)    Height:           Intake/Output Summary (Last 24 hours) at 8/29/2024 0644  Last data filed at 8/28/2024 2154  Gross per 24 hour   Intake 1684.01 ml   Output 1300 ml   Net 384.01 ml       PHYSICAL EXAM:  General: No acute distress, afebrile, resting comfortably  HEENT: NC/AT. EOMI. mild scleral icterus  Cardiovascular: RRR without murmurs. Normal capillary refill   Respiratory: CTAB  Abdomen: No abdominal tenderness no guarding negative Gonzalez sign  EXT:  BLAKE, no edema  Skin: Jaundiced, improved, No erythema/lesions   Neuro: Non-focal    LABS:  Recent Labs     08/28/24  0814 08/29/24  0346 08/30/24  0154   WBC 14.8* 10.1 9.9   RBC 4.03* 3.81* 3.90*   HEMOGLOBIN 12.4 11.9* 12.2   HEMATOCRIT 37.7 35.3* 35.9*   MCV 93.5 92.7 92.1   MCH 30.8 31.2 31.3   RDW 53.2* 54.0* 53.3*   PLATELETCT 334 314 383   MPV 10.9 10.9 10.4   NEUTSPOLYS 80.90* 66.80 63.00   LYMPHOCYTES 8.30* 20.10* 23.10   MONOCYTES 9.00 8.30 8.60   EOSINOPHILS 1.00 3.40 3.70   BASOPHILS 0.20 0.80 0.80     Recent Labs     08/28/24  1121 08/29/24  0346 08/30/24  0154   SODIUM 138 138 138   POTASSIUM 4.1 4.2 4.1   CHLORIDE 104 105 105   CO2 24 22 23   BUN 7* 7* 5*   CREATININE 0.27* 0.27* 0.40*   CALCIUM 8.7 8.1* 7.9*   ALBUMIN 2.8* 2.5* 2.7*     Estimated GFR/CRCL = Estimated Creatinine Clearance: 180.6 mL/min (A)  (by C-G formula based on SCr of 0.4 mg/dL (L)).  Recent Labs     08/28/24  1121 08/29/24  0346 08/30/24  0154   GLUCOSE 90 93 94     Recent Labs     08/28/24  0814 08/28/24  1121 08/29/24  0346 08/30/24  0154   ASTSGOT  --  87* 60* 31   ALTSGPT  --  51* 35 26   TBILIRUBIN  --  12.0* 8.1* 3.9*   ALKPHOSPHAT  --  467* 415* 404*   GLOBULIN  --  2.8 2.7 2.6   INR 1.16*  --   --   --              Recent Labs     08/28/24  0814   INR 1.16*         IMAGING:  LL-[id number]-I/O   Final Result      1.  Gallbladder contracted containing some sludge no gallstones.      2.  Common bile that measures 6.7 mm in diameter. No filling defects or strictures are currently identified. Mild central intrahepatic ductal dilatation.      3.  No pancreatic ductal dilatation or abnormality.      4.  Incidentally noted 1.5 cm left adrenal mass. Most likely adrenal adenoma. Follow-up CT could be obtained in 12 months.      DX-SKULL-LIMITED 3-   Final Result      Unremarkable exam.      BL-[id number] VIEW   Final Result      1.  Mild constipation.      US-RUQ   Final Result      1.  Dilated pancreatic and common bile duct. Recommend MRCP or ERCP for evaluation of distal obstruction or stenosis.   2.  Gallbladder wall thickening with sludge. No discrete cholelithiasis. Findings are equivocal for acute cholecystitis. Consider further evaluation with HIDA scan if indicated.   3.  Borderline dilated portal vein, concerning for portal hypertension.      DX-CHEST-PORTABLE (1 VIEW)   Final Result      No acute cardiac or pulmonary abnormalities are identified.      NM-BILIARY (HIDA) SCAN WITH CCK    (Results Pending)       MEDS:  Current Facility-Administered Medications   Medication Last Admin    cefTRIAXone (Rocephin) syringe 2,000 mg      lactated ringers infusion New Bag at 08/30/24 1001    Pharmacy Consult Request ...Pain Management Review 1 Each      oxyCODONE immediate-release (Roxicodone) tablet 2.5 mg      Or    oxyCODONE immediate-release  (Roxicodone) tablet 5 mg 5 mg at 08/28/24 0248    Or    HYDROmorphone (Dilaudid) injection 0.25 mg      ondansetron (Zofran) syringe/vial injection 4 mg      ondansetron (Zofran ODT) dispertab 4 mg      promethazine (Phenergan) tablet 12.5-25 mg      promethazine (Phenergan) suppository 12.5-25 mg      prochlorperazine (Compazine) injection 5-10 mg      senna-docusate (Pericolace Or Senokot S) 8.6-50 MG per tablet 2 Tablet 2 Tablet at 08/29/24 1707    And    polyethylene glycol/lytes (Miralax) Packet 1 Packet      carbidopa-levodopa (Sinemet)  MG tablet 1 Tablet 1 Tablet at 08/30/24 0530    lamoTRIgine (LaMICtal) tablet 25 mg 25 mg at 08/30/24 0652    levETIRAcetam (Keppra) tablet 500 mg 500 mg at 08/30/24 0530    ziprasidone (Geodon) capsule 60 mg 60 mg at 08/29/24 1707    thiamine (Vitamin B-1) tablet 100 mg 100 mg at 08/30/24 0530       ASSESSMENT/PLAN:    * Abdominal pain- (present on admission)  Assessment & Plan  MRCP no signs of choledocholithiasis but did show sludge in gallbladder which is consistent with RUQ US. GI signed off as no indication for ERCP. markedly downtrending liver enzymes as well as bilirubin.  Lipase normalizing.  CBC with white count downtrending.  Surgery consulted, were initially unable to contact patient's decision maker and upon repeat examination abdominal pain has resolved as of 8/30.  Recommended HIDA scan and if negative will not recommend surgery at this time.  See their note for further details.    Plan:  -N.p.o.  -Transition antibiotics to ceftriaxone 2 g daily  -Pain and antiemetic regimen ordered   -Follow blood cultures     Transaminitis  Assessment & Plan  Markedly downtrending transaminitis, as well as downtrending hyperbilirubinemia and lipase.  Patient likely passed a stone blocking pancreatic duct.    -General Surgery has been consulted for possible cholecystectomy, appreciate further recs  -N.p.o.    Sepsis (HCC)- (present on admission)  Assessment & Plan  This  is Sepsis Present on admission  SIRS criteria identified on my evaluation include: Tachycardia, with heart rate greater than 90 BPM, Tachypnea, with respirations greater than 20 per minute, and Leukocytosis, with WBC greater than 12,000 (downtrend ing and vitals now stable)   Clinical indicators of end organ dysfunction include Acute Liver Failure, with bilirubin greater than 2  Source is GI/cholangitis   Sepsis protocol initiated  Crystalloid Fluid Administration: Fluid resuscitation ordered per standard protocol - 30 mL/kg per current or ideal body weight + 125cc/hr mIVF   IV antibiotics as appropriate for source of sepsis  Blood cultures obtained, NGTD  White count is downtrending, will continue IV antibiotics at this time    Leukocytosis- (present on admission)  Assessment & Plan  Elevated to 21 on admission, now downtrending with IV antibiotics    -In addition to IV ceftriaxone 2 g daily  -Vital signs q4h, monitor for fevers  -Follow blood cultures, NGTD    Lives in group home- (present on admission)  Assessment & Plan  Lives at [facility].   History of developmental delay, PD, and epilepsy. [place]   Guardian not at bedside at the time of admission, no ACP docs in chart. Unable to contact.   Guardian/DPOA: Nay Seals   [phone]     Seizure (HCC)- (present on admission)  Assessment & Plan  Continue home medications  Seizure precautions     Parkinson disease (HCC)- (present on admission)  Assessment & Plan  Continue home medications    Bipolar 1 disorder, depressed, moderate (HCC)- (present on admission)  Assessment & Plan  Continue home medications         Core Measures  Fluids: 125 cc/h maintenance fluids  Lines: Peripheral IV for intravenous access  Abx: IV ceftriaxone 2 g daily  Diet: NPO pending HIDA  PPX: SCDs     CODE Status: Full Code     Ibrahima Flores MD  [facility]  PGY-1

## 2024-08-30 NOTE — CARE PLAN
The patient is Stable - Low risk of patient condition declining or worsening    Shift Goals  Clinical Goals: abx, VSS, Q2 turns  Patient Goals: sleep  Family Goals: NIKA    Progress made toward(s) clinical / shift goals:    Problem: Knowledge Deficit - Standard  Goal: Patient and family/care givers will demonstrate understanding of plan of care, disease process/condition, diagnostic tests and medications  Description: Target End Date:  1-3 days or as soon as patient condition allows    Document in Patient Education    1.  Patient and family/caregiver oriented to unit, equipment, visitation policy and means for communicating concern  2.  Complete/review Learning Assessment  3.  Assess knowledge level of disease process/condition, treatment plan, diagnostic tests and medications  4.  Explain disease process/condition, treatment plan, diagnostic tests and medications  Outcome: Progressing     Problem: Hemodynamics  Goal: Patient's hemodynamics, fluid balance and neurologic status will be stable or improve  Description: Target End Date:  Prior to discharge or change in level of care    Document on Assessment and I/O flowsheet templates    1.  Monitor vital signs, pulse oximetry and cardiac monitor per provider order and/or policy  2.  Maintain blood pressure per provider order  3.  Hemodynamic monitoring per provider order  4.  Manage IV fluids and IV infusions  5.  Monitor intake and output  6.  Daily weights per unit policy or provider order  7.  Assess peripheral pulses and capillary refill  8.  Assess color and body temperature  9.  Position patient for maximum circulation/cardiac output  10. Monitor for signs/symptoms of excessive bleeding  11. Assess mental status, restlessness and changes in level of consciousness  12. Monitor temperature and report fever or hypothermia to provider immediately. Consideration of targeted temperature management.  Outcome: Progressing       Patient is not progressing towards the  following goals:

## 2024-08-30 NOTE — CARE PLAN
The patient is Stable - Low risk of patient condition declining or worsening    Shift Goals  Clinical Goals: ABX, VSS, mobilize  Patient Goals: rest  Family Goals: NIKA    Progress made toward(s) clinical / shift goals:    Problem: Pain - Standard  Goal: Alleviation of pain or a reduction in pain to the patient’s comfort goal  Outcome: Progressing     Problem: Knowledge Deficit - Standard  Goal: Patient and family/care givers will demonstrate understanding of plan of care, disease process/condition, diagnostic tests and medications  Outcome: Progressing     Problem: Hemodynamics  Goal: Patient's hemodynamics, fluid balance and neurologic status will be stable or improve  Outcome: Progressing     Problem: Fluid Volume  Goal: Fluid volume balance will be maintained  Outcome: Progressing     Problem: Urinary - Renal Perfusion  Goal: Ability to achieve and maintain adequate renal perfusion and functioning will improve  Outcome: Progressing     Problem: Respiratory  Goal: Patient will achieve/maintain optimum respiratory ventilation and gas exchange  Outcome: Progressing     Problem: Mechanical Ventilation  Goal: Safe management of artificial airway and ventilation  Outcome: Progressing  Goal: Successful weaning off mechanical ventilator, spontaneously maintains adequate gas exchange  Outcome: Progressing  Goal: Patient will be able to express needs and understand communication  Outcome: Progressing     Problem: Physical Regulation  Goal: Diagnostic test results will improve  Outcome: Progressing  Goal: Signs and symptoms of infection will decrease  Outcome: Progressing     Problem: Fall Risk  Goal: Patient will remain free from falls  Outcome: Progressing     Problem: Skin Integrity  Goal: Skin integrity is maintained or improved  Outcome: Progressing       Patient is not progressing towards the following goals:

## 2024-08-30 NOTE — THERAPY
Physical Therapy Contact Note    Patient Name: Erin Santiago  Age:  44 y.o., Sex:  female  Medical Record #: [id number]  Today's Date: 8/30/2024 08/30/24 0737   Interdisciplinary Plan of Care Collaboration   Collaboration Comments Pt is to be scheduled for a cholecystectomy.  PT will follow up post op.   Session Information   Date / Session Number  8/30- plan is for surgery  (EVAL)

## 2024-08-30 NOTE — PROGRESS NOTES
Monitor Summary:  Rhythm/rate: SR67-80  Ectopy: PVC/ PAC  Measurements: .12/.10/.35

## 2024-08-30 NOTE — DISCHARGE PLANNING
Case Management Discharge Planning    Admission Date: 8/27/2024  GMLOS: 5.1  ALOS: 3    6-Clicks ADL Score: 16  6-Clicks Mobility Score: 10  PT and/or OT Eval ordered: Yes  Post-acute Referrals Ordered: No Pending Recs  Post-acute Choice Obtained: No  Has referral(s) been sent to post-acute provider:  No      Anticipated Discharge Dispo: Discharge Disposition: Discharged to home/self care (01)    DME Needed: No    Action(s) Taken: Updated Provider/Nurse on Discharge Plan and DC Assessment Complete (See below)    1037, Pt was visited at room to obtain assessment information and discuss discharge planning.      1500, RN POLI called Pt's legal guardian Tri  to obtain additional assessment information and discuss discharge planning but no answer.    Escalations Completed: None    Medically Clear: No    Next Steps: CM will continue to assist Pt with discharge needs.      Barriers to Discharge: Medical clearance and Pending Placement    Is the patient up for discharge tomorrow: No    Care Transition Team Assessment  RN POLI met with Pt at bedside to obtain assessment informations. Demographics verified. RN POLI introduced self and purpose of visit.   Pt lives at [facility].  Pt has  guardian and caregivers at [facility] for support.  Pt has GLYNN LUIS M.D. for PCP  Pt was independent with ADLs prior to hospitalization.      Information Source  Orientation Level: Oriented X4 (developmental delay)  Information Given By: Patient  Who is responsible for making decisions for patient? : Guardian  Name(s) of Primary Decision Maker: Tri Morrison / [phone]    Elopement Risk  Legal Hold: No  Ambulatory or Self Mobile in Wheelchair: Yes  Disoriented: No  Psychiatric Symptoms: None  History of Wandering: No  Elopement this Admit: No  Vocalizing Wanting to Leave: No  Displays Behaviors, Body Language Wanting to Leave: No-Not at Risk for Elopement  Elopement Risk: Not at Risk for  Elopement    Interdisciplinary Discharge Planning  Primary Care Physician: GLYNN LUIS M.D.  Lives with - Patient's Self Care Capacity: Attendant / Paid Care Giver  Patient or legal guardian wants to designate a caregiver: Yes  Caregiver name: Alyson  Caregiver contact info:  (Patient cannot sign.)  Support Systems: Other (Comments) (Caregivers at group home)  Housing / Facility: [facility] ([facility]  Name of Care Facility: [facility]  Durable Medical Equipment: Not Applicable    Discharge Preparedness  What is your plan after discharge?: Home with help  What are your discharge supports?: Guardian, Other (comment) (caregivers at group home)  Prior Functional Level: Independent with Activities of Daily Living, Independent with Medication Management    Functional Assesment  Prior Functional Level: Independent with Activities of Daily Living, Independent with Medication Management    Finances  Financial Barriers to Discharge: No  Prescription Coverage: Yes    Vision / Hearing Impairment  Vision Impairment : No  Hearing Impairment : No    Domestic Abuse  Have you ever been the victim of abuse or violence?: No  Possible Abuse/Neglect Reported to:: Not Applicable    Psychological Assessment  History of Substance Abuse: None  History of Psychiatric Problems: No    Anticipated Discharge Information  Discharge Disposition: Discharged to home/self care (01)

## 2024-08-31 VITALS
TEMPERATURE: 97.5 F | HEART RATE: 77 BPM | DIASTOLIC BLOOD PRESSURE: 68 MMHG | HEIGHT: 61 IN | BODY MASS INDEX: 36.55 KG/M2 | RESPIRATION RATE: 16 BRPM | SYSTOLIC BLOOD PRESSURE: 112 MMHG | OXYGEN SATURATION: 95 % | WEIGHT: 193.56 LBS

## 2024-08-31 LAB
ALBUMIN SERPL BCP-MCNC: 2.7 G/DL (ref 3.2–4.9)
ALBUMIN/GLOB SERPL: 1 G/DL
ALP SERPL-CCNC: 350 U/L (ref 30–99)
ALT SERPL-CCNC: 47 U/L (ref 2–50)
ANION GAP SERPL CALC-SCNC: 11 MMOL/L (ref 7–16)
AST SERPL-CCNC: 30 U/L (ref 12–45)
BASOPHILS # BLD AUTO: 0.9 % (ref 0–1.8)
BASOPHILS # BLD: 0.09 K/UL (ref 0–0.12)
BILIRUB SERPL-MCNC: 2.7 MG/DL (ref 0.1–1.5)
BUN SERPL-MCNC: 5 MG/DL (ref 8–22)
CALCIUM ALBUM COR SERPL-MCNC: 9.2 MG/DL (ref 8.5–10.5)
CALCIUM SERPL-MCNC: 8.2 MG/DL (ref 8.5–10.5)
CHLORIDE SERPL-SCNC: 107 MMOL/L (ref 96–112)
CO2 SERPL-SCNC: 23 MMOL/L (ref 20–33)
CREAT SERPL-MCNC: 0.45 MG/DL (ref 0.5–1.4)
EOSINOPHIL # BLD AUTO: 0.42 K/UL (ref 0–0.51)
EOSINOPHIL NFR BLD: 4.3 % (ref 0–6.9)
ERYTHROCYTE [DISTWIDTH] IN BLOOD BY AUTOMATED COUNT: 53.4 FL (ref 35.9–50)
GFR SERPLBLD CREATININE-BSD FMLA CKD-EPI: 120 ML/MIN/1.73 M 2
GLOBULIN SER CALC-MCNC: 2.7 G/DL (ref 1.9–3.5)
GLUCOSE SERPL-MCNC: 93 MG/DL (ref 65–99)
HCT VFR BLD AUTO: 35.6 % (ref 37–47)
HGB BLD-MCNC: 11.9 G/DL (ref 12–16)
IMM GRANULOCYTES # BLD AUTO: 0.06 K/UL (ref 0–0.11)
IMM GRANULOCYTES NFR BLD AUTO: 0.6 % (ref 0–0.9)
LYMPHOCYTES # BLD AUTO: 2.77 K/UL (ref 1–4.8)
LYMPHOCYTES NFR BLD: 28.2 % (ref 22–41)
MCH RBC QN AUTO: 31.3 PG (ref 27–33)
MCHC RBC AUTO-ENTMCNC: 33.4 G/DL (ref 32.2–35.5)
MCV RBC AUTO: 93.7 FL (ref 81.4–97.8)
MONOCYTES # BLD AUTO: 0.81 K/UL (ref 0–0.85)
MONOCYTES NFR BLD AUTO: 8.2 % (ref 0–13.4)
NEUTROPHILS # BLD AUTO: 5.68 K/UL (ref 1.82–7.42)
NEUTROPHILS NFR BLD: 57.8 % (ref 44–72)
NRBC # BLD AUTO: 0 K/UL
NRBC BLD-RTO: 0 /100 WBC (ref 0–0.2)
PLATELET # BLD AUTO: 400 K/UL (ref 164–446)
PMV BLD AUTO: 10.1 FL (ref 9–12.9)
POTASSIUM SERPL-SCNC: 4.1 MMOL/L (ref 3.6–5.5)
PROT SERPL-MCNC: 5.4 G/DL (ref 6–8.2)
RBC # BLD AUTO: 3.8 M/UL (ref 4.2–5.4)
SODIUM SERPL-SCNC: 141 MMOL/L (ref 135–145)
WBC # BLD AUTO: 9.8 K/UL (ref 4.8–10.8)

## 2024-08-31 PROCEDURE — 700102 HCHG RX REV CODE 250 W/ 637 OVERRIDE(OP)

## 2024-08-31 PROCEDURE — 80053 COMPREHEN METABOLIC PANEL: CPT

## 2024-08-31 PROCEDURE — 700105 HCHG RX REV CODE 258

## 2024-08-31 PROCEDURE — A9270 NON-COVERED ITEM OR SERVICE: HCPCS

## 2024-08-31 PROCEDURE — 99231 SBSQ HOSP IP/OBS SF/LOW 25: CPT

## 2024-08-31 PROCEDURE — 700111 HCHG RX REV CODE 636 W/ 250 OVERRIDE (IP)

## 2024-08-31 PROCEDURE — 85025 COMPLETE CBC W/AUTO DIFF WBC: CPT

## 2024-08-31 PROCEDURE — 700101 HCHG RX REV CODE 250

## 2024-08-31 PROCEDURE — 36415 COLL VENOUS BLD VENIPUNCTURE: CPT

## 2024-08-31 PROCEDURE — 770001 HCHG ROOM/CARE - MED/SURG/GYN PRIV*

## 2024-08-31 PROCEDURE — 99232 SBSQ HOSP IP/OBS MODERATE 35: CPT | Mod: GC | Performed by: FAMILY MEDICINE

## 2024-08-31 RX ADMIN — LAMOTRIGINE 25 MG: 25 TABLET ORAL at 04:51

## 2024-08-31 RX ADMIN — LEVETIRACETAM 500 MG: 500 TABLET, FILM COATED ORAL at 04:51

## 2024-08-31 RX ADMIN — CARBIDOPA AND LEVODOPA 1 TABLET: 25; 250 TABLET ORAL at 04:51

## 2024-08-31 RX ADMIN — SODIUM CHLORIDE, POTASSIUM CHLORIDE, SODIUM LACTATE AND CALCIUM CHLORIDE: 600; 310; 30; 20 INJECTION, SOLUTION INTRAVENOUS at 17:44

## 2024-08-31 RX ADMIN — ZIPRASIDONE HYDROCHLORIDE 60 MG: 60 CAPSULE ORAL at 17:41

## 2024-08-31 RX ADMIN — LEVETIRACETAM 500 MG: 500 TABLET, FILM COATED ORAL at 12:41

## 2024-08-31 RX ADMIN — CARBIDOPA AND LEVODOPA 1 TABLET: 25; 250 TABLET ORAL at 12:41

## 2024-08-31 RX ADMIN — CARBIDOPA AND LEVODOPA 1 TABLET: 25; 250 TABLET ORAL at 19:40

## 2024-08-31 RX ADMIN — LEVETIRACETAM 500 MG: 500 TABLET, FILM COATED ORAL at 17:41

## 2024-08-31 RX ADMIN — SENNOSIDES AND DOCUSATE SODIUM 2 TABLET: 50; 8.6 TABLET ORAL at 17:41

## 2024-08-31 RX ADMIN — CEFTRIAXONE SODIUM 2000 MG: 10 INJECTION, POWDER, FOR SOLUTION INTRAVENOUS at 12:41

## 2024-08-31 RX ADMIN — Medication 100 MG: at 04:51

## 2024-08-31 ASSESSMENT — FIBROSIS 4 INDEX: FIB4 SCORE: 0.73

## 2024-08-31 ASSESSMENT — PAIN DESCRIPTION - PAIN TYPE: TYPE: ACUTE PAIN

## 2024-08-31 NOTE — CARE PLAN
The patient is Stable - Low risk of patient condition declining or worsening    Shift Goals  Clinical Goals: VSS, ABX, rest  Patient Goals: rest  Family Goals: estrellita    Progress made toward(s) clinical / shift goals:    Problem: Pain - Standard  Goal: Alleviation of pain or a reduction in pain to the patient’s comfort goal  Outcome: Progressing     Problem: Knowledge Deficit - Standard  Goal: Patient and family/care givers will demonstrate understanding of plan of care, disease process/condition, diagnostic tests and medications  Outcome: Progressing     Problem: Hemodynamics  Goal: Patient's hemodynamics, fluid balance and neurologic status will be stable or improve  Outcome: Progressing     Problem: Fluid Volume  Goal: Fluid volume balance will be maintained  Outcome: Progressing     Problem: Urinary - Renal Perfusion  Goal: Ability to achieve and maintain adequate renal perfusion and functioning will improve  Outcome: Progressing     Problem: Respiratory  Goal: Patient will achieve/maintain optimum respiratory ventilation and gas exchange  Outcome: Progressing     Problem: Mechanical Ventilation  Goal: Safe management of artificial airway and ventilation  Outcome: Progressing  Goal: Successful weaning off mechanical ventilator, spontaneously maintains adequate gas exchange  Outcome: Progressing  Goal: Patient will be able to express needs and understand communication  Outcome: Progressing     Problem: Physical Regulation  Goal: Diagnostic test results will improve  Outcome: Progressing  Goal: Signs and symptoms of infection will decrease  Outcome: Progressing     Problem: Fall Risk  Goal: Patient will remain free from falls  Outcome: Progressing     Problem: Skin Integrity  Goal: Skin integrity is maintained or improved  Outcome: Progressing       Patient is not progressing towards the following goals:

## 2024-08-31 NOTE — PROGRESS NOTES
[facility] MEDICINE PROGRESS NOTE        Attending:   [unfilled]    Resident:   Ibrahima Flores M.D.    PATIENT:   Erin Santiago; [id number]; 1980    ID:   44 y.o. female admitted for sepsis secondary to suspected cholangitis    SUBJECTIVE:   No acute events overnight, patient made NPO for assumed surgical intervention after HIDA on 8/30 revealed possible cholecystitis, however surgery has not placed a note stating definitively if she is getting surgery today.  She denies pain today states she is feeling well.    OBJECTIVE:  Vitals:    08/31/24 0100 08/31/24 0355 08/31/24 0719 08/31/24 1146   BP:  137/86 126/79 125/71   Pulse:  65 67 69   Resp:  16 18 18   Temp:  36.5 °C (97.7 °F) 36.4 °C (97.5 °F) 36.3 °C (97.3 °F)   TempSrc:  Temporal Temporal Temporal   SpO2:  96% 98% 95%   Weight: 87.8 kg (193 lb 9 oz)      Height:           Intake/Output Summary (Last 24 hours) at 8/31/2024 0549  Last data filed at 8/30/2024 2306  Gross per 24 hour   Intake 440 ml   Output 2000 ml   Net -1560 ml       PHYSICAL EXAM:   General: No acute distress, afebrile, resting comfortably  HEENT: NC/AT. EOMI. very mild scleral icterus  Cardiovascular: RRR without murmurs. Normal capillary refill   Respiratory: CTAB  Abdomen: soft, nontender, nondistended, no masses  EXT:  BLAKE, no edema  Skin: No erythema/lesions, mild jaundice present  Neuro: Non-focal    LABS:  Recent Labs     08/29/24  0346 08/30/24  0154 08/31/24  0159   WBC 10.1 9.9 9.8   RBC 3.81* 3.90* 3.80*   HEMOGLOBIN 11.9* 12.2 11.9*   HEMATOCRIT 35.3* 35.9* 35.6*   MCV 92.7 92.1 93.7   MCH 31.2 31.3 31.3   RDW 54.0* 53.3* 53.4*   PLATELETCT 314 383 400   MPV 10.9 10.4 10.1   NEUTSPOLYS 66.80 63.00 57.80   LYMPHOCYTES 20.10* 23.10 28.20   MONOCYTES 8.30 8.60 8.20   EOSINOPHILS 3.40 3.70 4.30   BASOPHILS 0.80 0.80 0.90     Recent Labs     08/29/24  0346 08/30/24  0154 08/31/24  0159   SODIUM 138 138 141   POTASSIUM 4.2 4.1 4.1   CHLORIDE 105 105 107   CO2 22 23 23   BUN 7* 5*  "5*   CREATININE 0.27* 0.40* 0.45*   CALCIUM 8.1* 7.9* 8.2*   ALBUMIN 2.5* 2.7* 2.7*     Estimated GFR/CRCL = Estimated Creatinine Clearance: 157.3 mL/min (A) (by C-G formula based on SCr of 0.45 mg/dL (L)).  Recent Labs     08/29/24  0346 08/30/24  0154 08/31/24  0159   GLUCOSE 93 94 93     Recent Labs     08/29/24  0346 08/30/24  0154 08/31/24  0159   ASTSGOT 60* 31 30   ALTSGPT 35 26 47   TBILIRUBIN 8.1* 3.9* 2.7*   ALKPHOSPHAT 415* 404* 350*   GLOBULIN 2.7 2.6 2.7             No results for input(s): \"INR\", \"APTT\", \"FIBRINOGEN\" in the last 72 hours.    Invalid input(s): \"DIMER\"        IMAGING:  NM-BILIARY (HIDA) SCAN WITH CCK   Final Result      Nonvisualization of the gallbladder raising suspicion for cholecystitis      [id number]-S/O   Final Result      1.  Gallbladder contracted containing some sludge no gallstones.      2.  Common bile that measures 6.7 mm in diameter. No filling defects or strictures are currently identified. Mild central intrahepatic ductal dilatation.      3.  No pancreatic ductal dilatation or abnormality.      4.  Incidentally noted 1.5 cm left adrenal mass. Most likely adrenal adenoma. Follow-up CT could be obtained in 12 months.      DX-SKULL-LIMITED 3-   Final Result      Unremarkable exam.      [id number] VIEW   Final Result      1.  Mild constipation.      US-RUQ   Final Result      1.  Dilated pancreatic and common bile duct. Recommend MRCP or ERCP for evaluation of distal obstruction or stenosis.   2.  Gallbladder wall thickening with sludge. No discrete cholelithiasis. Findings are equivocal for acute cholecystitis. Consider further evaluation with HIDA scan if indicated.   3.  Borderline dilated portal vein, concerning for portal hypertension.      DX-CHEST-PORTABLE (1 VIEW)   Final Result      No acute cardiac or pulmonary abnormalities are identified.          MEDS:  Current Facility-Administered Medications   Medication Last Admin    cefTRIAXone (Rocephin) syringe 2,000 mg " 2,000 mg at 08/31/24 1241    lactated ringers infusion New Bag at 08/30/24 2309    Pharmacy Consult Request ...Pain Management Review 1 Each      oxyCODONE immediate-release (Roxicodone) tablet 2.5 mg      Or    oxyCODONE immediate-release (Roxicodone) tablet 5 mg 5 mg at 08/28/24 0248    Or    HYDROmorphone (Dilaudid) injection 0.25 mg      ondansetron (Zofran) syringe/vial injection 4 mg      ondansetron (Zofran ODT) dispertab 4 mg      promethazine (Phenergan) tablet 12.5-25 mg      promethazine (Phenergan) suppository 12.5-25 mg      prochlorperazine (Compazine) injection 5-10 mg      senna-docusate (Pericolace Or Senokot S) 8.6-50 MG per tablet 2 Tablet 2 Tablet at 08/30/24 1740    And    polyethylene glycol/lytes (Miralax) Packet 1 Packet      carbidopa-levodopa (Sinemet)  MG tablet 1 Tablet 1 Tablet at 08/31/24 1241    lamoTRIgine (LaMICtal) tablet 25 mg 25 mg at 08/31/24 0451    levETIRAcetam (Keppra) tablet 500 mg 500 mg at 08/31/24 1241    ziprasidone (Geodon) capsule 60 mg 60 mg at 08/30/24 1740    thiamine (Vitamin B-1) tablet 100 mg 100 mg at 08/31/24 0451       ASSESSMENT/PLAN:    * Abdominal pain- (present on admission)  Assessment & Plan  MRCP no signs of choledocholithiasis but did show sludge in gallbladder which is consistent with RUQ US. GI signed off as no indication for ERCP. markedly downtrending liver enzymes as well as bilirubin.  Lipase normalizing.  CBC with white count downtrending.  Surgery consulted, were initially unable to contact patient's decision maker and upon repeat examination abdominal pain has resolved as of 8/30.  Recommended HIDA scan, did not recommend surgery at this time due to likely absence of acute obstruction and high risk due to proximal anatomy seen on MRI.  They recommended close follow-up outpatient with hepatobiliary. Patient is currently clinically doing well and will likely be safe to discharge after advancing back to home diet with restriction of fatty  foods.     Plan:  -Advance diet as tolerated  -Transition antibiotics to oral regimen after tolerating diet well  -Pain and antiemetic regimen ordered   -Follow blood cultures, currently NGTD    Transaminitis  Assessment & Plan  Resolving. Patient likely passed a stone blocking pancreatic duct.  -ADAT    Sepsis (HCC)- (present on admission)  Assessment & Plan  This is Sepsis Present on admission  SIRS criteria identified on my evaluation include: Tachycardia, with heart rate greater than 90 BPM, Tachypnea, with respirations greater than 20 per minute, and Leukocytosis, with WBC greater than 12,000 (downtrend ing and vitals now stable)   Clinical indicators of end organ dysfunction include Acute Liver Failure, with bilirubin greater than 2  Source is GI/cholangitis   Sepsis protocol initiated  Crystalloid Fluid Administration: Fluid resuscitation ordered per standard protocol - 30 mL/kg per current or ideal body weight + 125cc/hr mIVF   IV antibiotics as appropriate for source of sepsis  Blood cultures obtained, NGTD  White count is downtrending, will continue IV antibiotics at this time    Leukocytosis- (present on admission)  Assessment & Plan  Elevated to 21 on admission, now downtrending with IV antibiotics    -In addition to IV ceftriaxone 2 g daily  -Vital signs q4h, monitor for fevers  -Follow blood cultures, NGTD    Lives in group home- (present on admission)  Assessment & Plan  Lives at [facility] [place].   History of developmental delay, PD, and epilepsy. [place]   Guardian not at bedside at the time of admission, no ACP docs in chart.   Guardian/DPOA: Nay Seals   [phone]     Seizure (HCC)- (present on admission)  Assessment & Plan  Continue home medications  Seizure precautions     Parkinson disease (HCC)- (present on admission)  Assessment & Plan  Continue home medications    Bipolar 1 disorder, depressed, moderate (HCC)- (present on admission)  Assessment & Plan  Continue  home medications         Core Measures  IV Fluids: LR @125cc/hr  Antbx:  CTX  DVT ppx:  SCDs  Code status: FULL  Dispo: inpatient    Ibrahima Flores MD  [facility]  PGY-1

## 2024-08-31 NOTE — CARE PLAN
Problem: Pain - Standard  Goal: Alleviation of pain or a reduction in pain to the patient’s comfort goal  Outcome: Progressing  Note: POC regarding pain management discussed with pt.  Pt will be medicated for pain per MAR       Problem: Knowledge Deficit - Standard  Goal: Patient and family/care givers will demonstrate understanding of plan of care, disease process/condition, diagnostic tests and medications  Outcome: Progressing  Note: Discussed POC and medications with patient.  Patient verbalized understanding.    The patient is Stable - Low risk of patient condition declining or worsening    Shift Goals  Clinical Goals: ABX, VSS, mobilize  Patient Goals: rest  Family Goals: NIKA

## 2024-08-31 NOTE — PROGRESS NOTES
"  DATE: 8/31/2024    Hospital Day 4  abdominal pain, nausea, & jaundice .    INTERVAL EVENTS:  Resolving jaundice.  Tbili 2.7 & LFTs.  Remains without abdominal pain or abdominal tenderness.    REVIEW OF SYSTEMS:  Comprehensive review of systems is negative with the exception of the aforementioned HPI, PMH, and PSH bullets in accordance with CMS guidelines.    PHYSICAL EXAMINATION:  Vital Signs: /79   Pulse 67   Temp 36.4 °C (97.5 °F) (Temporal)   Resp 18   Ht 1.549 m (5' 1\")   Wt 87.8 kg (193 lb 9 oz)   SpO2 98%     Physical Exam  Constitutional:       General: She is not in acute distress.     Appearance: She is not toxic-appearing or diaphoretic.   Eyes:      General: No scleral icterus.  Pulmonary:      Effort: Pulmonary effort is normal. No respiratory distress.   Abdominal:      General: There is no distension.      Palpations: Abdomen is soft.      Tenderness: There is no abdominal tenderness. There is no guarding. Negative signs include Gonzalez's sign.   Skin:     General: Skin is warm and dry.      Coloration: Skin is jaundiced (mild).   Neurological:      Mental Status: She is alert.       LABORATORY VALUES:  Recent Labs     08/29/24 0346 08/30/24 0154 08/31/24 0159   WBC 10.1 9.9 9.8   RBC 3.81* 3.90* 3.80*   HEMOGLOBIN 11.9* 12.2 11.9*   HEMATOCRIT 35.3* 35.9* 35.6*   MCV 92.7 92.1 93.7   MCH 31.2 31.3 31.3   MCHC 33.7 34.0 33.4   RDW 54.0* 53.3* 53.4*   PLATELETCT 314 383 400   MPV 10.9 10.4 10.1     Recent Labs     08/29/24 0346 08/30/24 0154 08/31/24 0159   SODIUM 138 138 141   POTASSIUM 4.2 4.1 4.1   CHLORIDE 105 105 107   CO2 22 23 23   GLUCOSE 93 94 93   BUN 7* 5* 5*   CREATININE 0.27* 0.40* 0.45*   CALCIUM 8.1* 7.9* 8.2*     Recent Labs     08/29/24 0346 08/30/24 0154 08/31/24 0159   ASTSGOT 60* 31 30   ALTSGPT 35 26 47   TBILIRUBIN 8.1* 3.9* 2.7*   ALKPHOSPHAT 415* 404* 350*   GLOBULIN 2.7 2.6 2.7       ASSESSMENT AND PLAN:  Abdominal pain with low suspicions for gallbladder " source at this time as the patient has normal LFTs, no leukocytosis, and is clinically asymptomatic. HIDA scan without filling of gallbladder, however the lumen may be entirely scarred down based on the appearance of the MRI. Due to her complex anatomy noted on imaging, this places the patient at a high risk for an intraoperative duct injury.    Okay for diet as tolerated. Recommend non operative management with antimicrobial therapy. Follow up closely outpatient with hepatobiliary team for  cholecystectomy evaluation.    [facility] will continue to follow to ensure diet tolerance.       ____________________________________     JUAN C Rice.

## 2024-09-01 VITALS
OXYGEN SATURATION: 97 % | BODY MASS INDEX: 36.63 KG/M2 | DIASTOLIC BLOOD PRESSURE: 76 MMHG | WEIGHT: 194 LBS | HEIGHT: 61 IN | SYSTOLIC BLOOD PRESSURE: 117 MMHG | HEART RATE: 85 BPM | TEMPERATURE: 97.9 F | RESPIRATION RATE: 16 BRPM

## 2024-09-01 PROBLEM — A41.9 SEPSIS (HCC): Status: RESOLVED | Noted: 2024-08-27 | Resolved: 2024-09-01

## 2024-09-01 PROBLEM — R74.01 TRANSAMINITIS: Status: RESOLVED | Noted: 2024-08-27 | Resolved: 2024-09-01

## 2024-09-01 PROBLEM — D72.829 LEUKOCYTOSIS: Status: RESOLVED | Noted: 2023-07-12 | Resolved: 2024-09-01

## 2024-09-01 PROBLEM — R10.9 ABDOMINAL PAIN: Status: RESOLVED | Noted: 2024-08-27 | Resolved: 2024-09-01

## 2024-09-01 LAB
ALBUMIN SERPL BCP-MCNC: 2.7 G/DL (ref 3.2–4.9)
ALBUMIN/GLOB SERPL: 1 G/DL
ALP SERPL-CCNC: 323 U/L (ref 30–99)
ALT SERPL-CCNC: 32 U/L (ref 2–50)
ANION GAP SERPL CALC-SCNC: 11 MMOL/L (ref 7–16)
AST SERPL-CCNC: 26 U/L (ref 12–45)
BACTERIA BLD CULT: NORMAL
BACTERIA BLD CULT: NORMAL
BILIRUB SERPL-MCNC: 2.1 MG/DL (ref 0.1–1.5)
BUN SERPL-MCNC: 4 MG/DL (ref 8–22)
CALCIUM ALBUM COR SERPL-MCNC: 9.2 MG/DL (ref 8.5–10.5)
CALCIUM SERPL-MCNC: 8.2 MG/DL (ref 8.5–10.5)
CHLORIDE SERPL-SCNC: 106 MMOL/L (ref 96–112)
CO2 SERPL-SCNC: 23 MMOL/L (ref 20–33)
CREAT SERPL-MCNC: 0.45 MG/DL (ref 0.5–1.4)
ERYTHROCYTE [DISTWIDTH] IN BLOOD BY AUTOMATED COUNT: 53.3 FL (ref 35.9–50)
GFR SERPLBLD CREATININE-BSD FMLA CKD-EPI: 120 ML/MIN/1.73 M 2
GLOBULIN SER CALC-MCNC: 2.7 G/DL (ref 1.9–3.5)
GLUCOSE SERPL-MCNC: 101 MG/DL (ref 65–99)
HCT VFR BLD AUTO: 35.8 % (ref 37–47)
HGB BLD-MCNC: 12 G/DL (ref 12–16)
MCH RBC QN AUTO: 31.7 PG (ref 27–33)
MCHC RBC AUTO-ENTMCNC: 33.5 G/DL (ref 32.2–35.5)
MCV RBC AUTO: 94.5 FL (ref 81.4–97.8)
PLATELET # BLD AUTO: 462 K/UL (ref 164–446)
PMV BLD AUTO: 10.2 FL (ref 9–12.9)
POTASSIUM SERPL-SCNC: 4.3 MMOL/L (ref 3.6–5.5)
PROT SERPL-MCNC: 5.4 G/DL (ref 6–8.2)
RBC # BLD AUTO: 3.79 M/UL (ref 4.2–5.4)
SIGNIFICANT IND 70042: NORMAL
SIGNIFICANT IND 70042: NORMAL
SITE SITE: NORMAL
SITE SITE: NORMAL
SODIUM SERPL-SCNC: 140 MMOL/L (ref 135–145)
SOURCE SOURCE: NORMAL
SOURCE SOURCE: NORMAL
WBC # BLD AUTO: 10.4 K/UL (ref 4.8–10.8)

## 2024-09-01 PROCEDURE — 99238 HOSP IP/OBS DSCHRG MGMT 30/<: CPT | Mod: GC | Performed by: FAMILY MEDICINE

## 2024-09-01 PROCEDURE — 97162 PT EVAL MOD COMPLEX 30 MIN: CPT

## 2024-09-01 PROCEDURE — 99231 SBSQ HOSP IP/OBS SF/LOW 25: CPT

## 2024-09-01 PROCEDURE — 85027 COMPLETE CBC AUTOMATED: CPT

## 2024-09-01 PROCEDURE — 700101 HCHG RX REV CODE 250

## 2024-09-01 PROCEDURE — 700102 HCHG RX REV CODE 250 W/ 637 OVERRIDE(OP)

## 2024-09-01 PROCEDURE — A9270 NON-COVERED ITEM OR SERVICE: HCPCS

## 2024-09-01 PROCEDURE — 700111 HCHG RX REV CODE 636 W/ 250 OVERRIDE (IP)

## 2024-09-01 PROCEDURE — 700105 HCHG RX REV CODE 258

## 2024-09-01 PROCEDURE — 36415 COLL VENOUS BLD VENIPUNCTURE: CPT

## 2024-09-01 PROCEDURE — 80053 COMPREHEN METABOLIC PANEL: CPT

## 2024-09-01 RX ADMIN — LAMOTRIGINE 25 MG: 25 TABLET ORAL at 04:51

## 2024-09-01 RX ADMIN — SODIUM CHLORIDE, POTASSIUM CHLORIDE, SODIUM LACTATE AND CALCIUM CHLORIDE: 600; 310; 30; 20 INJECTION, SOLUTION INTRAVENOUS at 01:50

## 2024-09-01 RX ADMIN — CARBIDOPA AND LEVODOPA 1 TABLET: 25; 250 TABLET ORAL at 13:08

## 2024-09-01 RX ADMIN — SODIUM CHLORIDE, POTASSIUM CHLORIDE, SODIUM LACTATE AND CALCIUM CHLORIDE: 600; 310; 30; 20 INJECTION, SOLUTION INTRAVENOUS at 08:36

## 2024-09-01 RX ADMIN — ZIPRASIDONE HYDROCHLORIDE 60 MG: 60 CAPSULE ORAL at 16:57

## 2024-09-01 RX ADMIN — LEVETIRACETAM 500 MG: 500 TABLET, FILM COATED ORAL at 04:51

## 2024-09-01 RX ADMIN — CEFTRIAXONE SODIUM 2000 MG: 10 INJECTION, POWDER, FOR SOLUTION INTRAVENOUS at 13:08

## 2024-09-01 RX ADMIN — LEVETIRACETAM 500 MG: 500 TABLET, FILM COATED ORAL at 13:08

## 2024-09-01 RX ADMIN — LEVETIRACETAM 500 MG: 500 TABLET, FILM COATED ORAL at 16:57

## 2024-09-01 RX ADMIN — Medication 100 MG: at 04:51

## 2024-09-01 RX ADMIN — CARBIDOPA AND LEVODOPA 1 TABLET: 25; 250 TABLET ORAL at 04:51

## 2024-09-01 ASSESSMENT — FIBROSIS 4 INDEX: FIB4 SCORE: 0.46

## 2024-09-01 ASSESSMENT — COGNITIVE AND FUNCTIONAL STATUS - GENERAL
MOVING TO AND FROM BED TO CHAIR: A LITTLE
SUGGESTED CMS G CODE MODIFIER MOBILITY: CK
TURNING FROM BACK TO SIDE WHILE IN FLAT BAD: A LITTLE
STANDING UP FROM CHAIR USING ARMS: A LITTLE
WALKING IN HOSPITAL ROOM: A LITTLE
MOBILITY SCORE: 18
MOVING FROM LYING ON BACK TO SITTING ON SIDE OF FLAT BED: A LITTLE
CLIMB 3 TO 5 STEPS WITH RAILING: A LITTLE

## 2024-09-01 ASSESSMENT — GAIT ASSESSMENTS
ASSISTIVE DEVICE: FRONT WHEEL WALKER
DISTANCE (FEET): 20
GAIT LEVEL OF ASSIST: CONTACT GUARD ASSIST
DEVIATION: BRADYKINETIC;SHUFFLED GAIT

## 2024-09-01 NOTE — DISCHARGE PLANNING
1330  CAMELIA spoke with Carmelo ([facility]). Carmelo says unable to find company to transport pt to group [place], via wheelchair. Carmelo is escalating request to supervisors. Advised JAROD Crowley.     1510  DPA received call from Kevin ([facility]/supervisor), still looking for transport. Let Kevin know CAMELIA would be scheduling transport another way, per JAROD Crowley.     1523  DC Transport Scheduled     Transport Company Scheduled: [employer]  Spoke with Aris    Schedule Date: 9/1/2024  Scheduled Time: 5411-0607    Dest[facility]  [address]     Reference ID: [id number]  Amount: $119.25    Advised JAROD Crowley.

## 2024-09-01 NOTE — PROGRESS NOTES
"  DATE: 9/1/2024    Hospital Day 5  abdominal pain, nausea, & jaundice .    INTERVAL EVENTS:  Resolved jaundice.  Tbili decreased to 2.1.  Remains without abdominal pain or abdominal tenderness on Rocephin.    REVIEW OF SYSTEMS:  Comprehensive review of systems is negative with the exception of the aforementioned HPI, PMH, and PSH bullets in accordance with CMS guidelines.    PHYSICAL EXAMINATION:  Vital Signs: /74   Pulse 76   Temp 36.4 °C (97.5 °F) (Temporal)   Resp 16   Ht 1.549 m (5' 1\")   Wt 88 kg (194 lb 0.1 oz)   SpO2 93%     Physical Exam  Constitutional:       General: She is not in acute distress.     Appearance: She is not toxic-appearing or diaphoretic.   Eyes:      General: No scleral icterus.  Pulmonary:      Effort: Pulmonary effort is normal. No respiratory distress.   Abdominal:      General: There is no distension.      Palpations: Abdomen is soft.      Tenderness: There is no abdominal tenderness. There is no guarding. Negative signs include Gonzalez's sign.   Skin:     General: Skin is warm and dry.      Coloration: Skin is jaundiced (mild).   Neurological:      Mental Status: She is alert.       LABORATORY VALUES:  Recent Labs     08/30/24 0154 08/31/24 0159 09/01/24  0204   WBC 9.9 9.8 10.4   RBC 3.90* 3.80* 3.79*   HEMOGLOBIN 12.2 11.9* 12.0   HEMATOCRIT 35.9* 35.6* 35.8*   MCV 92.1 93.7 94.5   MCH 31.3 31.3 31.7   MCHC 34.0 33.4 33.5   RDW 53.3* 53.4* 53.3*   PLATELETCT 383 400 462*   MPV 10.4 10.1 10.2     Recent Labs     08/30/24 0154 08/31/24 0159 09/01/24  0204   SODIUM 138 141 140   POTASSIUM 4.1 4.1 4.3   CHLORIDE 105 107 106   CO2 23 23 23   GLUCOSE 94 93 101*   BUN 5* 5* 4*   CREATININE 0.40* 0.45* 0.45*   CALCIUM 7.9* 8.2* 8.2*     Recent Labs     08/30/24 0154 08/31/24 0159 09/01/24  0204   ASTSGOT 31 30 26   ALTSGPT 26 47 32   TBILIRUBIN 3.9* 2.7* 2.1*   ALKPHOSPHAT 404* 350* 323*   GLOBULIN 2.6 2.7 2.7       ASSESSMENT AND PLAN:  Abdominal pain with low suspicion " for gallbladder source as the patient has normal LFTs, no leukocytosis, is tolerating a diet, and is clinically asymptomatic. Her HIDA scan is without filling of gallbladder, however the lumen may be entirely scarred down based on the appearance of the MRI. Due to her complex anatomy noted on imaging, this places the patient at a high risk for an intraoperative duct injury. She is tolerating non operative management with antibiotics as her exam remains benign.     Follow up outpatient with [facility]'s hepatobiliary team for cholecystectomy evaluation.    [facility] will sign off. Please contact our service if you have any additional questions or concerns.         ____________________________________     JUAN C Rice.

## 2024-09-01 NOTE — DISCHARGE SUMMARY
Admit Date:  8/27/2024       Discharge Date:   9/1/2024      Service:   [facility] Inpatient Team  Attending Physician(s):   Elana Arceo MD, MPH       Senior Resident(s):   Teresa  Antolin Resident(s):   Mark    Primary Diagnosis:   Sepsis    Secondary Diagnoses:            Acute Cholangitis  Parkinson's disease  Epilepsy  Bipolar I    HPI (Per Dr. Alvarado's Admission H&P):     Erin Santiago is a 44 y.o. female with a history of developmental delay, Parkinson disease, and epilepsy who was transferred from her group home for fever, abdominal pain, and yellowing of her eyes and skin.       During my interview, patient was alone without her guardian or staff from her group home.  She is cognitively and developmentally declined at baseline and thus history is limited.  She is able to report that her pain began approximately 1 week ago in the right side of her abdomen, felt to get better, and then started to get worse yesterday.  She developed a high fever the earlier today while she was at work but cannot identify how high. She is states that staff at her group home have noticed that her eyes and her skin have been turning yellow.  She has been nauseated with vomiting though cannot tell me how many episodes of vomiting she has had or what it looks like.  She denies diarrhea/constipation.  She denies any pain with urination.  She states that her group home is responsible for giving her her medications, she thinks that she only takes her medications once in the morning.  She last remembers taking them this morning.      ER Course:  In the ER, Erin was afebrile with a temperature of 98.3F, heart rate of 96, respiratory rate 18 which increased to 22, blood pressure 115/78, satting on room air at 98% saturation.  She appeared to be diffusely jaundiced and had scleral icterus.  She was cognitively delayed at her baseline.  Labs revealed a leukocytosis to 21.6 no anemia, AST of 129, , alkaline phosphatase  elevated to 525, lipase 1016, total bili 12.2.  Lactic acid 1.5.  EKG showed sinus tachycardia, troponin less than 6.  Chest x-ray showed no acute cardiopulmonary abnormalities.  A right upper quadrant ultrasound showed dilated pancreatic and common bile ducts, with recommendation for MRCP versus ERCP.  The gallbladder was thickened with sludge, no visible cholelithiasis, consider HIDA scan.  The portal vein was mildly distended, concerning for portal hypertension.  With the ERP consulted to gastroenterology and discussed with ISSA Ng and Dr. James, with recommendation to pursue MRCP and likely ERCP tomorrow morning.  Patient was given a dose of IV Zosyn in the ED after blood cultures were drawn.      Hospital Summary (Brief Narrative):         Erin Santiago was admitted to [facility] on 8/27/2024 for management of sepsis likely due to cholangitis.  MRCP showed no signs of choledocholithiasis but did still show gallbladder sludge consistent with the right upper quadrant ultrasound.  There is no indication for ERCP.  There was difficulty in contacting the guardian of this patient in order to consent for surgery.  During this delay patient had resolution of jaundice and pain with lab values showing resolution of infection.  HIDA scan was ordered prior to intervention, there was no visualized filling of gallbladder on the scan however based on MRI surgery believes that that may have been due to scarring.  Based on complex anatomy seen on imaging and patient's significant improvement and stability, general surgery recommended outpatient follow-up with hepatobiliary team for cholecystectomy evaluation.    Patient was started on Zosyn and later transitioned to ceftriaxone for 6 days total of antibiotics with good resolution of leukocytosis and transaminitis.  Lipase decreased from 1000-100, bilirubin down trended to 2.1 from 12 on admission.  LFTs also down trended.  Patient was slowly transitioned from n.p.o.  to clear liquid then full liquid then regular diet (low-fat).  She had no abdominal pain from 8/30 through 9/1 and tolerated diet advancement well.    Consultants:      General Surgery, gastroenterology    Procedures:        MRCP, HIDA    Labs:  Results for orders placed or performed during the hospital encounter of 08/27/24   Troponin   Result Value Ref Range    Troponin T <6 6 - 19 ng/L   CBC WITH DIFFERENTIAL   Result Value Ref Range    WBC 21.6 (H) 4.8 - 10.8 K/uL    RBC 4.70 4.20 - 5.40 M/uL    Hemoglobin 14.6 12.0 - 16.0 g/dL    Hematocrit 43.5 37.0 - 47.0 %    MCV 92.6 81.4 - 97.8 fL    MCH 31.1 27.0 - 33.0 pg    MCHC 33.6 32.2 - 35.5 g/dL    RDW 51.1 (H) 35.9 - 50.0 fL    Platelet Count 448 (H) 164 - 446 K/uL    MPV 10.2 9.0 - 12.9 fL    Neutrophils-Polys 85.60 (H) 44.00 - 72.00 %    Lymphocytes 5.60 (L) 22.00 - 41.00 %    Monocytes 7.80 0.00 - 13.40 %    Eosinophils 0.10 0.00 - 6.90 %    Basophils 0.30 0.00 - 1.80 %    Immature Granulocytes 0.60 0.00 - 0.90 %    Nucleated RBC 0.00 0.00 - 0.20 /100 WBC    Neutrophils (Absolute) 18.50 (H) 1.82 - 7.42 K/uL    Lymphs (Absolute) 1.21 1.00 - 4.80 K/uL    Monos (Absolute) 1.68 (H) 0.00 - 0.85 K/uL    Eos (Absolute) 0.03 0.00 - 0.51 K/uL    Baso (Absolute) 0.06 0.00 - 0.12 K/uL    Immature Granulocytes (abs) 0.14 (H) 0.00 - 0.11 K/uL    NRBC (Absolute) 0.00 K/uL   COMP METABOLIC PANEL   Result Value Ref Range    Sodium 139 135 - 145 mmol/L    Potassium 3.8 3.6 - 5.5 mmol/L    Chloride 102 96 - 112 mmol/L    Co2 23 20 - 33 mmol/L    Anion Gap 14.0 7.0 - 16.0    Glucose 132 (H) 65 - 99 mg/dL    Bun 11 8 - 22 mg/dL    Creatinine 0.45 (L) 0.50 - 1.40 mg/dL    Calcium 9.3 8.5 - 10.5 mg/dL    Correct Calcium 9.5 8.5 - 10.5 mg/dL    AST(SGOT) 129 (H) 12 - 45 U/L    ALT(SGPT) 206 (H) 2 - 50 U/L    Alkaline Phosphatase 525 (H) 30 - 99 U/L    Total Bilirubin 12.2 (H) 0.1 - 1.5 mg/dL    Albumin 3.7 3.2 - 4.9 g/dL    Total Protein 6.9 6.0 - 8.2 g/dL    Globulin 3.2 1.9 - 3.5  g/dL    A-G Ratio 1.2 g/dL   LIPASE   Result Value Ref Range    Lipase 1016 (H) 11 - 82 U/L   HCG QUAL SERUM   Result Value Ref Range    Beta-Hcg Qualitative Serum Negative Negative   URINALYSIS,CULTURE IF INDICATED    Specimen: Urine, Clean Catch   Result Value Ref Range    Color DK Yellow     Character Clear     Specific Gravity 1.008 <1.035    Ph 6.5 5.0 - 8.0    Glucose Negative Negative mg/dL    Ketones Negative Negative mg/dL    Protein Negative Negative mg/dL    Bilirubin Moderate (A) Negative    Urobilinogen, Urine 0.2 Negative    Nitrite Negative Negative    Leukocyte Esterase Trace (A) Negative    Occult Blood Negative Negative    Micro Urine Req Microscopic    ESTIMATED GFR   Result Value Ref Range    GFR (CKD-EPI) 120 >60 mL/min/1.73 m 2   Lactic Acid   Result Value Ref Range    Lactic Acid 1.5 0.5 - 2.0 mmol/L   Urine Culture (New)    Specimen: Urine   Result Value Ref Range    Significant Indicator NEG     Source UR     Site -     Culture Result No growth at 48 hours.    Blood Culture - Draw one from central line and one from peripheral site    Specimen: Peripheral; Blood   Result Value Ref Range    Significant Indicator NEG     Source BLD     Site PERIPHERAL     Culture Result       No Growth  Note: Blood cultures are incubated for 5 days and  are monitored continuously.Positive blood cultures  are called to the RN and reported as soon as  they are identified.     Blood Culture - Draw one from central line and one from peripheral site    Specimen: Line; Blood   Result Value Ref Range    Significant Indicator NEG     Source BLD     Site Peripheral     Culture Result       No Growth  Note: Blood cultures are incubated for 5 days and  are monitored continuously.Positive blood cultures  are called to the RN and reported as soon as  they are identified.     Prothrombin time (INR)   Result Value Ref Range    PT 14.9 (H) 12.0 - 14.6 sec    INR 1.16 (H) 0.87 - 1.13   CBC with Differential   Result Value Ref  Range    WBC 14.8 (H) 4.8 - 10.8 K/uL    RBC 4.03 (L) 4.20 - 5.40 M/uL    Hemoglobin 12.4 12.0 - 16.0 g/dL    Hematocrit 37.7 37.0 - 47.0 %    MCV 93.5 81.4 - 97.8 fL    MCH 30.8 27.0 - 33.0 pg    MCHC 32.9 32.2 - 35.5 g/dL    RDW 53.2 (H) 35.9 - 50.0 fL    Platelet Count 334 164 - 446 K/uL    MPV 10.9 9.0 - 12.9 fL    Neutrophils-Polys 80.90 (H) 44.00 - 72.00 %    Lymphocytes 8.30 (L) 22.00 - 41.00 %    Monocytes 9.00 0.00 - 13.40 %    Eosinophils 1.00 0.00 - 6.90 %    Basophils 0.20 0.00 - 1.80 %    Immature Granulocytes 0.60 0.00 - 0.90 %    Nucleated RBC 0.00 0.00 - 0.20 /100 WBC    Neutrophils (Absolute) 11.99 (H) 1.82 - 7.42 K/uL    Lymphs (Absolute) 1.23 1.00 - 4.80 K/uL    Monos (Absolute) 1.34 (H) 0.00 - 0.85 K/uL    Eos (Absolute) 0.15 0.00 - 0.51 K/uL    Baso (Absolute) 0.03 0.00 - 0.12 K/uL    Immature Granulocytes (abs) 0.09 0.00 - 0.11 K/uL    NRBC (Absolute) 0.00 K/uL   Comp Metabolic Panel   Result Value Ref Range    Sodium 138 135 - 145 mmol/L    Potassium 4.1 3.6 - 5.5 mmol/L    Chloride 104 96 - 112 mmol/L    Co2 24 20 - 33 mmol/L    Anion Gap 10.0 7.0 - 16.0    Glucose 90 65 - 99 mg/dL    Bun 7 (L) 8 - 22 mg/dL    Creatinine 0.27 (L) 0.50 - 1.40 mg/dL    Calcium 8.7 8.5 - 10.5 mg/dL    Correct Calcium 9.7 8.5 - 10.5 mg/dL    AST(SGOT) 87 (H) 12 - 45 U/L    ALT(SGPT) 51 (H) 2 - 50 U/L    Alkaline Phosphatase 467 (H) 30 - 99 U/L    Total Bilirubin 12.0 (H) 0.1 - 1.5 mg/dL    Albumin 2.8 (L) 3.2 - 4.9 g/dL    Total Protein 5.6 (L) 6.0 - 8.2 g/dL    Globulin 2.8 1.9 - 3.5 g/dL    A-G Ratio 1.0 g/dL   Lipid Profile   Result Value Ref Range    Cholesterol,Tot 232 (H) 100 - 199 mg/dL    Triglycerides 96 0 - 149 mg/dL    HDL 23 (A) >=40 mg/dL     (H) <100 mg/dL   URINE MICROSCOPIC (W/UA)   Result Value Ref Range    WBC 0-2 /hpf    RBC 0-2 /hpf    Bacteria Negative None /hpf    Epithelial Cells Negative /hpf    Trans Epithelial Cells Few /hpf    Hyaline Cast 0-2 /lpf   ESTIMATED GFR   Result Value  Ref Range    GFR (CKD-EPI) 136 >60 mL/min/1.73 m 2   Comp Metabolic Panel   Result Value Ref Range    Sodium 138 135 - 145 mmol/L    Potassium 4.2 3.6 - 5.5 mmol/L    Chloride 105 96 - 112 mmol/L    Co2 22 20 - 33 mmol/L    Anion Gap 11.0 7.0 - 16.0    Glucose 93 65 - 99 mg/dL    Bun 7 (L) 8 - 22 mg/dL    Creatinine 0.27 (L) 0.50 - 1.40 mg/dL    Calcium 8.1 (L) 8.5 - 10.5 mg/dL    Correct Calcium 9.3 8.5 - 10.5 mg/dL    AST(SGOT) 60 (H) 12 - 45 U/L    ALT(SGPT) 35 2 - 50 U/L    Alkaline Phosphatase 415 (H) 30 - 99 U/L    Total Bilirubin 8.1 (H) 0.1 - 1.5 mg/dL    Albumin 2.5 (L) 3.2 - 4.9 g/dL    Total Protein 5.2 (L) 6.0 - 8.2 g/dL    Globulin 2.7 1.9 - 3.5 g/dL    A-G Ratio 0.9 g/dL   CBC WITH DIFFERENTIAL   Result Value Ref Range    WBC 10.1 4.8 - 10.8 K/uL    RBC 3.81 (L) 4.20 - 5.40 M/uL    Hemoglobin 11.9 (L) 12.0 - 16.0 g/dL    Hematocrit 35.3 (L) 37.0 - 47.0 %    MCV 92.7 81.4 - 97.8 fL    MCH 31.2 27.0 - 33.0 pg    MCHC 33.7 32.2 - 35.5 g/dL    RDW 54.0 (H) 35.9 - 50.0 fL    Platelet Count 314 164 - 446 K/uL    MPV 10.9 9.0 - 12.9 fL    Neutrophils-Polys 66.80 44.00 - 72.00 %    Lymphocytes 20.10 (L) 22.00 - 41.00 %    Monocytes 8.30 0.00 - 13.40 %    Eosinophils 3.40 0.00 - 6.90 %    Basophils 0.80 0.00 - 1.80 %    Immature Granulocytes 0.60 0.00 - 0.90 %    Nucleated RBC 0.00 0.00 - 0.20 /100 WBC    Neutrophils (Absolute) 6.74 1.82 - 7.42 K/uL    Lymphs (Absolute) 2.03 1.00 - 4.80 K/uL    Monos (Absolute) 0.84 0.00 - 0.85 K/uL    Eos (Absolute) 0.34 0.00 - 0.51 K/uL    Baso (Absolute) 0.08 0.00 - 0.12 K/uL    Immature Granulocytes (abs) 0.06 0.00 - 0.11 K/uL    NRBC (Absolute) 0.00 K/uL   LIPASE   Result Value Ref Range    Lipase 140 (H) 11 - 82 U/L   ESTIMATED GFR   Result Value Ref Range    GFR (CKD-EPI) 136 >60 mL/min/1.73 m 2   CBC WITH DIFFERENTIAL   Result Value Ref Range    WBC 9.9 4.8 - 10.8 K/uL    RBC 3.90 (L) 4.20 - 5.40 M/uL    Hemoglobin 12.2 12.0 - 16.0 g/dL    Hematocrit 35.9 (L) 37.0 -  47.0 %    MCV 92.1 81.4 - 97.8 fL    MCH 31.3 27.0 - 33.0 pg    MCHC 34.0 32.2 - 35.5 g/dL    RDW 53.3 (H) 35.9 - 50.0 fL    Platelet Count 383 164 - 446 K/uL    MPV 10.4 9.0 - 12.9 fL    Neutrophils-Polys 63.00 44.00 - 72.00 %    Lymphocytes 23.10 22.00 - 41.00 %    Monocytes 8.60 0.00 - 13.40 %    Eosinophils 3.70 0.00 - 6.90 %    Basophils 0.80 0.00 - 1.80 %    Immature Granulocytes 0.80 0.00 - 0.90 %    Nucleated RBC 0.00 0.00 - 0.20 /100 WBC    Neutrophils (Absolute) 6.23 1.82 - 7.42 K/uL    Lymphs (Absolute) 2.28 1.00 - 4.80 K/uL    Monos (Absolute) 0.85 0.00 - 0.85 K/uL    Eos (Absolute) 0.37 0.00 - 0.51 K/uL    Baso (Absolute) 0.08 0.00 - 0.12 K/uL    Immature Granulocytes (abs) 0.08 0.00 - 0.11 K/uL    NRBC (Absolute) 0.00 K/uL   Comp Metabolic Panel   Result Value Ref Range    Sodium 138 135 - 145 mmol/L    Potassium 4.1 3.6 - 5.5 mmol/L    Chloride 105 96 - 112 mmol/L    Co2 23 20 - 33 mmol/L    Anion Gap 10.0 7.0 - 16.0    Glucose 94 65 - 99 mg/dL    Bun 5 (L) 8 - 22 mg/dL    Creatinine 0.40 (L) 0.50 - 1.40 mg/dL    Calcium 7.9 (L) 8.5 - 10.5 mg/dL    Correct Calcium 8.9 8.5 - 10.5 mg/dL    AST(SGOT) 31 12 - 45 U/L    ALT(SGPT) 26 2 - 50 U/L    Alkaline Phosphatase 404 (H) 30 - 99 U/L    Total Bilirubin 3.9 (H) 0.1 - 1.5 mg/dL    Albumin 2.7 (L) 3.2 - 4.9 g/dL    Total Protein 5.3 (L) 6.0 - 8.2 g/dL    Globulin 2.6 1.9 - 3.5 g/dL    A-G Ratio 1.0 g/dL   ESTIMATED GFR   Result Value Ref Range    GFR (CKD-EPI) 123 >60 mL/min/1.73 m 2   CBC WITH DIFFERENTIAL   Result Value Ref Range    WBC 9.8 4.8 - 10.8 K/uL    RBC 3.80 (L) 4.20 - 5.40 M/uL    Hemoglobin 11.9 (L) 12.0 - 16.0 g/dL    Hematocrit 35.6 (L) 37.0 - 47.0 %    MCV 93.7 81.4 - 97.8 fL    MCH 31.3 27.0 - 33.0 pg    MCHC 33.4 32.2 - 35.5 g/dL    RDW 53.4 (H) 35.9 - 50.0 fL    Platelet Count 400 164 - 446 K/uL    MPV 10.1 9.0 - 12.9 fL    Neutrophils-Polys 57.80 44.00 - 72.00 %    Lymphocytes 28.20 22.00 - 41.00 %    Monocytes 8.20 0.00 - 13.40 %     Eosinophils 4.30 0.00 - 6.90 %    Basophils 0.90 0.00 - 1.80 %    Immature Granulocytes 0.60 0.00 - 0.90 %    Nucleated RBC 0.00 0.00 - 0.20 /100 WBC    Neutrophils (Absolute) 5.68 1.82 - 7.42 K/uL    Lymphs (Absolute) 2.77 1.00 - 4.80 K/uL    Monos (Absolute) 0.81 0.00 - 0.85 K/uL    Eos (Absolute) 0.42 0.00 - 0.51 K/uL    Baso (Absolute) 0.09 0.00 - 0.12 K/uL    Immature Granulocytes (abs) 0.06 0.00 - 0.11 K/uL    NRBC (Absolute) 0.00 K/uL   Comp Metabolic Panel   Result Value Ref Range    Sodium 141 135 - 145 mmol/L    Potassium 4.1 3.6 - 5.5 mmol/L    Chloride 107 96 - 112 mmol/L    Co2 23 20 - 33 mmol/L    Anion Gap 11.0 7.0 - 16.0    Glucose 93 65 - 99 mg/dL    Bun 5 (L) 8 - 22 mg/dL    Creatinine 0.45 (L) 0.50 - 1.40 mg/dL    Calcium 8.2 (L) 8.5 - 10.5 mg/dL    Correct Calcium 9.2 8.5 - 10.5 mg/dL    AST(SGOT) 30 12 - 45 U/L    ALT(SGPT) 47 2 - 50 U/L    Alkaline Phosphatase 350 (H) 30 - 99 U/L    Total Bilirubin 2.7 (H) 0.1 - 1.5 mg/dL    Albumin 2.7 (L) 3.2 - 4.9 g/dL    Total Protein 5.4 (L) 6.0 - 8.2 g/dL    Globulin 2.7 1.9 - 3.5 g/dL    A-G Ratio 1.0 g/dL   ESTIMATED GFR   Result Value Ref Range    GFR (CKD-EPI) 120 >60 mL/min/1.73 m 2   CBC WITHOUT DIFFERENTIAL   Result Value Ref Range    WBC 10.4 4.8 - 10.8 K/uL    RBC 3.79 (L) 4.20 - 5.40 M/uL    Hemoglobin 12.0 12.0 - 16.0 g/dL    Hematocrit 35.8 (L) 37.0 - 47.0 %    MCV 94.5 81.4 - 97.8 fL    MCH 31.7 27.0 - 33.0 pg    MCHC 33.5 32.2 - 35.5 g/dL    RDW 53.3 (H) 35.9 - 50.0 fL    Platelet Count 462 (H) 164 - 446 K/uL    MPV 10.2 9.0 - 12.9 fL   Comp Metabolic Panel   Result Value Ref Range    Sodium 140 135 - 145 mmol/L    Potassium 4.3 3.6 - 5.5 mmol/L    Chloride 106 96 - 112 mmol/L    Co2 23 20 - 33 mmol/L    Anion Gap 11.0 7.0 - 16.0    Glucose 101 (H) 65 - 99 mg/dL    Bun 4 (L) 8 - 22 mg/dL    Creatinine 0.45 (L) 0.50 - 1.40 mg/dL    Calcium 8.2 (L) 8.5 - 10.5 mg/dL    Correct Calcium 9.2 8.5 - 10.5 mg/dL    AST(SGOT) 26 12 - 45 U/L     ALT(SGPT) 32 2 - 50 U/L    Alkaline Phosphatase 323 (H) 30 - 99 U/L    Total Bilirubin 2.1 (H) 0.1 - 1.5 mg/dL    Albumin 2.7 (L) 3.2 - 4.9 g/dL    Total Protein 5.4 (L) 6.0 - 8.2 g/dL    Globulin 2.7 1.9 - 3.5 g/dL    A-G Ratio 1.0 g/dL   ESTIMATED GFR   Result Value Ref Range    GFR (CKD-EPI) 120 >60 mL/min/1.73 m 2   EKG   Result Value Ref Range    Report       [facility] Emergency Dept.    Test Date:  2024  Pt Name:    PHU RICHARDS                  Department: ER  MRN:        [id number]                      Room:  Gender:     Female                       Technician: [id number]  :        1980                   Requested By:ER TRIAGE PROTOCOL  Order #:    [id number]                    Reading MD:    Measurements  Intervals                                Axis  Rate:       100                          P:          44  IN:         145                          QRS:        13  QRSD:       73                           T:          37  QT:         338  QTc:        436    Interpretive Statements  Sinus tachycardia  Abnormal R-wave progression, early transition  Compared to ECG 2023 12:54:45  Sinus rhythm no longer present  Possible ischemia no longer present     EKG   Result Value Ref Range    Report       [facility] Emergency Dept.    Test Date:  2024  Pt Name:    PHU RICHARDS                  Department: ER  MRN:        [id number]                      Room:       T725  Gender:     Female                       Technician: [id number]  :        1980                   Requested By:ER TRIAGE PROTOCOL  Order #:    [id number]                    Reading MD: DEBBIE REICH    Measurements  Intervals                                Axis  Rate:       100                          P:          44  IN:         145                          QRS:        13  QRSD:       73                           T:          37  QT:         338  QTc:        436    Interpretive Statements  Sinus  tachycardia  Abnormal R-wave progression, early transition  Compared to ECG 07/04/2023 12:54:45  Sinus rhythm no longer present  Possible ischemia no longer present  Impression: Sinus tachycardia no evidence of ischemia.  Electronically Signed On 08- 20:22:25 PDT by DEBBIE REICH         Imaging/ Testing:      NM-BILIARY (HIDA) SCAN WITH CCK   Final Result      Nonvisualization of the gallbladder raising suspicion for cholecystitis      [id number]-V/[id number]   Final Result      1.  Gallbladder contracted containing some sludge no gallstones.      2.  Common bile that measures 6.7 mm in diameter. No filling defects or strictures are currently identified. Mild central intrahepatic ductal dilatation.      3.  No pancreatic ductal dilatation or abnormality.      4.  Incidentally noted 1.5 cm left adrenal mass. Most likely adrenal adenoma. Follow-up CT could be obtained in 12 months.      DX-SKULL-LIMITED 3-   Final Result      Unremarkable exam.      [id number] VIEW   Final Result      1.  Mild constipation.      US-RUQ   Final Result      1.  Dilated pancreatic and common bile duct. Recommend MRCP or ERCP for evaluation of distal obstruction or stenosis.   2.  Gallbladder wall thickening with sludge. No discrete cholelithiasis. Findings are equivocal for acute cholecystitis. Consider further evaluation with HIDA scan if indicated.   3.  Borderline dilated portal vein, concerning for portal hypertension.      DX-CHEST-PORTABLE (1 VIEW)   Final Result      No acute cardiac or pulmonary abnormalities are identified.          Physical Exam:  Constitutional: Alert, no distress, well-groomed.  Skin: Warm, dry, good turgor, no rashes in visible areas.  Eye: Equal, round and reactive, conjunctiva clear, lids normal.  No scleral icterus  ENMT: Lips without lesions, good dentition, moist mucous membranes.  Neck: Trachea midline, no masses, no thyromegaly.  Respiratory: Unlabored respiratory effort, no cough.  MSK: Moves all  extremities.  Neuro: Grossly normal  Psych: Normal affect and mood.    Discharge Medications:           Medication List        CONTINUE taking these medications        Instructions   Prevail Super Plus XLarge Misc   WEAR DAILY, CHANGE AS NEEDED.            ASK your doctor about these medications        Instructions   carbidopa-levodopa  MG Tabs  Commonly known as: Sinemet   Take 1 Tab by mouth 3 times a day.  Dose: 1 Tablet     cetirizine 10 MG Tabs  Commonly known as: ZyrTEC   Take 1 Tablet by mouth every day.  Dose: 10 mg     lamoTRIgine 25 MG Tabs  Commonly known as: LaMICtal   Take 1 Tab by mouth every day.  Dose: 25 mg     levETIRAcetam 500 MG Tabs  Commonly known as: Keppra   Take 1 Tab by mouth 3 times a day.  Dose: 500 mg     metFORMIN  MG Tb24  Commonly known as: Glucophage XR   TAKE 1 TABLET BY MOUTH ONCE DAILY FOR PRE-DIABETES.     Multivitamin Adult (Minerals) Tabs   TAKE 1 TABLET BY MOUTH ONCE DAILY  Dose: 1 Tablet     permethrin 5 % Crea  Commonly known as: Elimite   Apply to affected areas topically from head to toe, leave on overnight, and wash off in the morning.  Repeat in 1 week     True Vitamin B1 100 MG Tabs  Generic drug: thiamine   TAKE 1 TABLET BY MOUTH ONCE DAILY  Dose: 100 mg     ziprasidone 60 MG Caps  Commonly known as: Geodon   Take 60 mg by mouth every evening.  Dose: 60 mg                Disposition:  Back to group home    Diet:   Low fat (< 1tablespoon/meal), avoid chocolate, whole milk, ice cream, processed cheeses, egg yolk, deep fried, buttered foods.     Activity:   Ad ning    Instructions:      Please make sure to eat a low fat diet  Follow up with hepatobiliary in 1 week for surgical evaluation and planning     The patient was instructed to return to the ER in the event of worsening symptoms. I have counseled the patient on the importance of compliance and the patient has agreed to proceed with all medical recommendations and follow up plan indicated above.   The  patient understands that all medications come with benefits and risks. Risks may include permanent injury or death and these risks can be minimized with close reassessment and monitoring.        Please CC: Castro Noel M.D., Dravin James M.D.        Follow up appointment details :        Future Appointments   Date Time Provider Department Center   9/6/2024  1:30 PM [facility]       Follow up with Primary Care Physician within 7 days of discharge for further evaluation and care     Pending Studies:        None    (2) The patient met the 2-midnight criteria for an inpatient stay at the time of discharge.

## 2024-09-01 NOTE — THERAPY
"Physical Therapy   Initial Evaluation     Patient Name: Erin Santiago  Age:  44 y.o., Sex:  female  Medical Record #: [id number]  Today's Date: 9/1/2024     Precautions  Precautions: Fall Risk    Assessment  Patient is 44 y.o. female admitted with abdominal pain, nausea, vomiting, jaundice; diagnosis of sepsis 2/2 suspected cholangitis, \"MRCP showed no signs of choledocholithiasis but did still show gallbladder sludge consistent with the right upper quadrant ultrasound. There is no indication for ERCP.\" PMHx of bipolar, parkinson's disease, seizures, obesity, developmental delay. Pt required SBA for bed mobility, CGA for standing, transfers, short distance ambulation with FWW. Pt reporting she does not use FWW at home and is independent with all mobility, however, per last admission reported owned FWW. Reports attendant able to assist if needed at her Group Home. Recommend return to group home with no anticipated PT needs, unless pt truly does not own FWW then would benefit. Pt would benefit from continued acute IP PT services to address said deficits.     Plan    Physical Therapy Initial Treatment Plan   Treatment Plan : Bed Mobility, Equipment, Family / Caregiver Training, Gait Training, Manual Therapy, Neuro Re-Education / Balance, Self Care / Home Evaluation, Stair Training, Therapeutic Activities, Therapeutic Exercise  Treatment Frequency: 3 Times per Week  Duration: Until Therapy Goals Met    DC Equipment Recommendations:  (FWW if does not own)  Discharge Recommendations: Anticipate that the patient will have no further physical therapy needs after discharge from the hospital       Subjective    \"That was good.\" Regarding ambulation     Objective     09/01/24 1052   Vitals   Room Air Oximetry 95   O2 Delivery Device None - Room Air   Pain 0 - 10 Group   Therapist Pain Assessment Post Activity Pain Same as Prior to Activity;Nurse Notified;0   Prior Living Situation   Housing / Facility Group Home  ([facility])   Steps Into Home   (w/c ramp)   Steps In Home 0   Equipment Owned None  (per previous admission noted FWW)   Lives with - Patient's Self Care Capacity Attendant / Paid Care Giver   Comments Per pt, will need to clarify. Reports bedroom on ground floor   Prior Level of Functional Mobility   Bed Mobility Independent   Transfer Status Independent   Ambulation Independent   Ambulation Distance   (household)   Assistive Devices Used None   Comments Will need to clarify, per previous admit pt utilizing FWW. Reports attendants do not assist with mobility, however, can if needed. Reports performs her own ADLs, but does receive meals   History of Falls   History of Falls Yes   Date of Last Fall   (1 year ago)   Cognition    Cognition / Consciousness X   Speech/ Communication   (developmental delay, slurred words at times)   Level of Consciousness Alert   Comments Pleasant and cooperative. Follows 2 step commands   Strength Upper Body   Comments functional for mobility, not formally assessed   Active ROM Lower Body    Active ROM Lower Body  WDL   Strength Lower Body   Lower Body Strength  WDL   Comments appears baseline   Balance Assessment   Sitting Balance (Static) Fair +   Sitting Balance (Dynamic) Fair +   Standing Balance (Static) Fair -   Standing Balance (Dynamic) Fair -   Weight Shift Sitting Good   Weight Shift Standing Fair   Comments w/ FWW   Bed Mobility    Supine to Sit Standby Assist   Scooting Standby Assist   Rolling Standby Assist   Gait Analysis   Gait Level Of Assist Contact Guard Assist   Assistive Device Front Wheel Walker   Distance (Feet) 20   # of Times Distance was Traveled 1   Deviation Bradykinetic;Shuffled Gait  (lifting up walker at times)   Functional Mobility   Sit to Stand Contact Guard Assist   Bed, Chair, Wheelchair Transfer Contact Guard Assist   Transfer Method Stand Step   Mobility w/ FWW in room, up to recliner   6 Clicks Assessment - How much HELP from from  another person do you currently need... (If the patient hasn't done an activity recently, how much help from another person do you think he/she would need if he/she tried?)   Turning from your back to your side while in a flat bed without using bedrails? 3   Moving from lying on your back to sitting on the side of a flat bed without using bedrails? 3   Moving to and from a bed to a chair (including a wheelchair)? 3   Standing up from a chair using your arms (e.g., wheelchair, or bedside chair)? 3   Walking in hospital room? 3   Climbing 3-5 steps with a railing? 3   6 clicks Mobility Score 18   Activity Tolerance   Sitting in Chair up post   Sitting Edge of Bed 2 min   Standing 5 min   Patient / Family Goals    Patient / Family Goal #1 to go home   Short Term Goals    Short Term Goal # 1 Pt will perform supine <> sit with SPV in 6 visits to get in/out of bed   Short Term Goal # 2 Pt will perform stand step transfers with FWW and SPV in 6 visits to get in/out of chair   Short Term Goal # 3 Pt will ambulate 100ft with FWW and SPV in 6 visits to access home environment   Education Group   Education Provided Role of Physical Therapist;Gait Training;Use of Assistive Device   Role of Physical Therapist Patient Response Patient;Acceptance;Explanation;Verbal Demonstration   Gait Training Patient Response Patient;Acceptance;Explanation;Action Demonstration   Use of Assistive Device Patient Response Patient;Acceptance;Explanation;Action Demonstration   Additional Comments Pt receptive to self management and compensatory strategies with mobility   Physical Therapy Initial Treatment Plan    Treatment Plan  Bed Mobility;Equipment;Family / Caregiver Training;Gait Training;Manual Therapy;Neuro Re-Education / Balance;Self Care / Home Evaluation;Stair Training;Therapeutic Activities;Therapeutic Exercise   Treatment Frequency 3 Times per Week   Duration Until Therapy Goals Met   Problem List    Problems Impaired Bed Mobility;Impaired  Transfers;Impaired Ambulation;Impaired Balance;Decreased Activity Tolerance;Safety Awareness Deficits / Cognition   Anticipated Discharge Equipment and Recommendations   DC Equipment Recommendations   (FWW if does not own)   Discharge Recommendations Anticipate that the patient will have no further physical therapy needs after discharge from the hospital   Interdisciplinary Plan of Care Collaboration   IDT Collaboration with  Nursing;Physician   Patient Position at End of Therapy Seated;Chair Alarm On;Call Light within Reach;Tray Table within Reach;Phone within Reach   Collaboration Comments RN updated   Session Information   Date / Session Number  9/1- 1 (1/3, 9/7)

## 2024-09-01 NOTE — CARE PLAN
Problem: Pain - Standard  Goal: Alleviation of pain or a reduction in pain to the patient’s comfort goal  Outcome: Progressing  Note: POC regarding pain management discussed with pt.  Pt will be medicated for pain per MAR       Problem: Knowledge Deficit - Standard  Goal: Patient and family/care givers will demonstrate understanding of plan of care, disease process/condition, diagnostic tests and medications  Outcome: Progressing  Note: Discussed POC and medications with patient.  Patient verbalized understanding.     The patient is Stable - Low risk of patient condition declining or worsening    Shift Goals  Clinical Goals: VSS, ABX, rest  Patient Goals: rest  Family Goals: estrellita

## 2024-09-01 NOTE — PROGRESS NOTES
Called patient's group home and updated Gerrin with patient status, medications, and hospital interventions. Gerrin expressed understanding and all questions answered. Patient will return to the group home around 1750.    Patient also educated on medications and diet change. Patient toileted and dressed for transport. All questions answered.

## 2024-09-01 NOTE — DISCHARGE PLANNING
Case Management Discharge Planning    Admission Date: 8/27/2024  GMLOS: 5.1  ALOS: 5    6-Clicks ADL Score: 16  6-Clicks Mobility Score: 18  PT and/or OT Eval ordered: No  Post-acute Referrals Ordered: No  Post-acute Choice Obtained: No  Has referral(s) been sent to post-acute provider:  NA      Anticipated Discharge Dispo: Discharge Disposition: Discharged to home/self care (01)    DME Needed: No    Action(s) Taken: Updated Provider/Nurse on Discharge Plan and Transport Arranged   At 1528, I contacted Willian Lantigua, Public Guardian on call today at [phone]. He had spoken with the bedside RN and received a report for patient. He gave permission for her to return to the group home when he spoke with bedside RN.   I contacted him to present IMM information and obtained verbal acknowledgement by phone of appeal rights information.   I had spoken with the [facility] representative earlier by calling [phone]. Group home information:  [facility] [address] [place], [place].      transport has been arranged.  time here is 1700 to 1715.     I contacted the group home at 1550. The group home would like a report from the bedside RN. I voalte messaged the bedside RN.     PT recommended a FWW.  @ 0568, bedside RN verified patient has one already.    Escalations Completed: None    Medically Clear: Yes    Next Steps: follow for any needs    Barriers to Discharge: None    Is the patient up for discharge tomorrow: No

## 2024-09-01 NOTE — DISCHARGE INSTRUCTIONS
Please follow up with GI doctors within 1 week of discharge or as soon as able.     Please avoid fatty foods as much as possible.

## 2024-09-01 NOTE — PROGRESS NOTES
Report received from night shift RN, assumed care of pt. Pt A&Ox4. Plan of care discussed with pt, labs and chart reviewed. All needs met at this time. On room air. Call light within reach, bed locked and in lowest position. All fall precautions and hourly rounding in place. Care is ongoing.

## 2024-09-06 ENCOUNTER — APPOINTMENT (OUTPATIENT)
Dept: MEDICAL GROUP | Facility: CLINIC | Age: 44
End: 2024-09-06
Payer: MEDICARE

## 2024-09-06 VITALS
WEIGHT: 188 LBS | HEIGHT: 60 IN | OXYGEN SATURATION: 97 % | HEART RATE: 76 BPM | SYSTOLIC BLOOD PRESSURE: 92 MMHG | DIASTOLIC BLOOD PRESSURE: 62 MMHG | BODY MASS INDEX: 36.91 KG/M2

## 2024-09-06 DIAGNOSIS — Z87.19 H/O ACUTE CHOLANGITIS: ICD-10-CM

## 2024-09-06 PROCEDURE — 99213 OFFICE O/P EST LOW 20 MIN: CPT | Mod: GE

## 2024-09-06 ASSESSMENT — FIBROSIS 4 INDEX: FIB4 SCORE: 0.46

## 2024-09-06 NOTE — LETTER
September 6, 2024    To Whom It May Concern:         This is confirmation that Erin Vickiebipin Santiago attended her scheduled appointment with Parvin Hsu M.D. on 9/06/24. Eirn is medically cleared to return to work with no restrictions.          If you have any questions please do not hesitate to call me at the phone number listed below.    Sincerely,          Parvin Hsu M.D.  [phone]

## 2024-09-06 NOTE — ASSESSMENT & PLAN NOTE
Hospitalized on 8/27, s/p antibiotic course with complete resolution of symptoms to include scleral icterus.  Patient anticipated to follow-up outpatient with general surgery for consideration/evaluation for cholecystectomy.    Plan:  - Referral to general surgery  - Return precautions given  - Provided patient and caregiver education on prognosis

## 2024-09-06 NOTE — PROGRESS NOTES
Subjective:     CC:   Chief Complaint   Patient presents with    Other     Medical Clearance        HPI:   Erin presents today for hospital follow-up.  Patient was admitted on 8/27 for management of sepsis secondary to cholangitis.  MRCP was performed that time which showed no signs of choledocholithiasis and ultimately, team proceeded with medical management with recommendation of general surgery for close outpatient follow-up with hepatobiliary team for potential cholecystectomy evaluation.  Patient has since completed her course of antibiotics.  Since discharge, patient reports she is feeling well.  She is completely asymptomatic from the standpoint of her right upper quadrant pain and her scleral icterus has completely resolved.  Patient would like to request a letter for medical clearance so that she may return back to work.  She has no questions or concerns.  Patient has not been contacted by general surgery for outpatient follow-up.  caregiver would like a new referral to be placed at this time.    Current Outpatient Medications Ordered in Epic   Medication Sig Dispense Refill    permethrin (ELIMITE) 5 % Cream Apply to affected areas topically from head to toe, leave on overnight, and wash off in the morning.  Repeat in 1 week 60 g 0    cetirizine (ZYRTEC) 10 MG Tab Take 1 Tablet by mouth every day. 30 Tablet 0    TRUE VITAMIN B1 100 MG Tab TAKE 1 TABLET BY MOUTH ONCE DAILY 30 Tablet 0    metFORMIN ER (GLUCOPHAGE XR) 500 MG TABLET SR 24 HR TAKE 1 TABLET BY MOUTH ONCE DAILY FOR PRE-DIABETES. 30 Tablet 0    Multiple Vitamins-Minerals (MULTIVITAMIN ADULT, MINERALS,) Tab TAKE 1 TABLET BY MOUTH ONCE DAILY 30 Tablet 0    lamoTRIgine (LAMICTAL) 25 MG Tab Take 1 Tab by mouth every day. 90 Tab 1    carbidopa-levodopa (SINEMET)  MG Tab Take 1 Tab by mouth 3 times a day. 270 Tab 1    levETIRAcetam (KEPPRA) 500 MG Tab Take 1 Tab by mouth 3 times a day. 270 Tab 1    ziprasidone (GEODON) 60 MG Cap Take 60 mg by  mouth every evening.      Incontinence Supply Disposable (PREVAIL SUPER PLUS XLARGE) Misc WEAR DAILY, CHANGE AS NEEDED. 98 Each 3     No current Epic-ordered facility-administered medications on file.       ROS:  Negative except for above in HPI    Objective:     Exam:  BP 92/62   Pulse 76   Ht 1.524 m (5')   Wt 85.3 kg (188 lb)   SpO2 97%   BMI 36.72 kg/m²  Body mass index is 36.72 kg/m².    Gen: Pleasant, cooperative, alert and oriented, intellectually disabled  HEENT: NCAT, MMM, No lymphadenopathy, neck supple  Lungs: Normal effort, CTA bilaterally, no wheezes, rhonchi, or rales  CV: Regular rate and rhythm. No murmurs, rubs, or gallops. Radial pulses palpable bilat  Abd: Soft, non-distended, no guarding, no rebound, non-tender to palpation, negative Gonzalez sign   Ext: No clubbing, cyanosis, edema.  Neuro: Non-focal      Assessment & Plan:     44 y.o. female with the following -     Problem List Items Addressed This Visit       H/O acute cholangitis     Hospitalized on 8/27, s/p antibiotic course with complete resolution of symptoms to include scleral icterus.  Patient anticipated to follow-up outpatient with general surgery for consideration/evaluation for cholecystectomy.    Plan:  - Referral to general surgery  - Return precautions given  - Provided patient and caregiver education on prognosis         Relevant Orders    Referral to General Surgery          Return in about 6 months (around 3/6/2025), or if symptoms worsen or fail to improve.    Parvin Hsu M.D.

## 2024-09-12 ENCOUNTER — OFFICE VISIT (OUTPATIENT)
Dept: MEDICAL GROUP | Facility: CLINIC | Age: 44
End: 2024-09-12
Payer: MEDICARE

## 2024-09-12 VITALS
BODY MASS INDEX: 35.68 KG/M2 | HEART RATE: 83 BPM | WEIGHT: 189 LBS | RESPIRATION RATE: 20 BRPM | OXYGEN SATURATION: 96 % | TEMPERATURE: 98.2 F | HEIGHT: 61 IN | DIASTOLIC BLOOD PRESSURE: 87 MMHG | SYSTOLIC BLOOD PRESSURE: 114 MMHG

## 2024-09-12 DIAGNOSIS — Z87.19 H/O ACUTE CHOLANGITIS: ICD-10-CM

## 2024-09-12 PROCEDURE — 99213 OFFICE O/P EST LOW 20 MIN: CPT | Mod: GE

## 2024-09-12 ASSESSMENT — FIBROSIS 4 INDEX: FIB4 SCORE: 0.46

## 2024-09-12 NOTE — PROGRESS NOTES
"Subjective:     CC: follow up     HPI:   Erin presents today as follow up. Patient was recently hospitalized for acute cholangitis.  Patient was discharged to follow-up with general surgery for cholecystectomy.  Patient was seen last in our clinic 1 week ago and referral to general surgery was placed.  Patient reports that she continues to have some mild right upper quadrant pain.  She denies fever, vomiting or diarrhea.  She has been eating a low-fat diet.  Patient is here today as she did not yet get general surgery referral.  She also needs a refill of her Lamictal.  She is followed for seizures as well as Parkinson's by a neurologist and has follow-up with them soon.      Current Outpatient Medications Ordered in Epic   Medication Sig Dispense Refill    permethrin (ELIMITE) 5 % Cream Apply to affected areas topically from head to toe, leave on overnight, and wash off in the morning.  Repeat in 1 week 60 g 0    cetirizine (ZYRTEC) 10 MG Tab Take 1 Tablet by mouth every day. 30 Tablet 0    TRUE VITAMIN B1 100 MG Tab TAKE 1 TABLET BY MOUTH ONCE DAILY 30 Tablet 0    metFORMIN ER (GLUCOPHAGE XR) 500 MG TABLET SR 24 HR TAKE 1 TABLET BY MOUTH ONCE DAILY FOR PRE-DIABETES. 30 Tablet 0    Multiple Vitamins-Minerals (MULTIVITAMIN ADULT, MINERALS,) Tab TAKE 1 TABLET BY MOUTH ONCE DAILY 30 Tablet 0    Incontinence Supply Disposable (PREVAIL SUPER PLUS XLARGE) Misc WEAR DAILY, CHANGE AS NEEDED. 98 Each 3    lamoTRIgine (LAMICTAL) 25 MG Tab Take 1 Tab by mouth every day. 90 Tab 1    carbidopa-levodopa (SINEMET)  MG Tab Take 1 Tab by mouth 3 times a day. 270 Tab 1    levETIRAcetam (KEPPRA) 500 MG Tab Take 1 Tab by mouth 3 times a day. 270 Tab 1    ziprasidone (GEODON) 60 MG Cap Take 60 mg by mouth every evening.       No current Epic-ordered facility-administered medications on file.     Objective:     Exam:  /87   Pulse 83   Temp 36.8 °C (98.2 °F) (Tympanic)   Resp 20   Ht 1.543 m (5' 0.75\")   Wt 85.7 kg (189 " lb)   SpO2 96%   BMI 36.01 kg/m²  Body mass index is 36.01 kg/m².    Gen: Alert and oriented, No apparent distress.  Lungs: Normal effort, CTA bilaterally, no wheezes, rhonchi, or rales  Abdomen: mild tenderness over right upper quadrant, no distention, no rebound or guarding   CV: Regular rate and rhythm. No murmurs, rubs, or gallops.  Ext: No clubbing, cyanosis, edema.    Assessment & Plan:     44 y.o. female with the following -     1. H/O acute cholangitis  Patient here as follow up regarding acute cholangitis in which she was hospitalized for.  She has some mild RUQ pain but otherwise still eating, no vomiting, or fever. She has not yet seen general surgery outpatient regarding this. Reviewed records, referral was placed last week. Gave patient and care giver employee information for them to contact. Advised patient to return to clinic if she is unable to get in or go to the ER if she has fever, vomiting, or worsening RUQ pain.     Nan Kirkland M.D.  PGY-3 [employer] [facility]

## 2024-09-16 NOTE — TELEPHONE ENCOUNTER
Received request via: Pharmacy    Was the patient seen in the last year in this department? Yes    Does the patient have an active prescription (recently filled or refills available) for medication(s) requested? No    Pharmacy Name: [facility] - [place], [place] - [id number]     Does the patient have long-term Plus and need 100-day supply? (This applies to ALL medications) Patient does not have SCP  
No significant past surgical history

## 2024-09-17 RX ORDER — GAUZE BANDAGE 2" X 2"
100 BANDAGE TOPICAL DAILY
Qty: 30 TABLET | Refills: 3 | Status: SHIPPED | OUTPATIENT
Start: 2024-09-17

## 2024-10-04 RX ORDER — METFORMIN HYDROCHLORIDE 500 MG/1
TABLET, EXTENDED RELEASE ORAL
Qty: 30 TABLET | Refills: 3 | Status: SHIPPED | OUTPATIENT
Start: 2024-10-04

## 2024-11-07 ENCOUNTER — APPOINTMENT (OUTPATIENT)
Dept: RADIOLOGY | Facility: MEDICAL CENTER | Age: 44
End: 2024-11-07
Attending: EMERGENCY MEDICINE
Payer: MEDICARE

## 2024-11-07 ENCOUNTER — APPOINTMENT (OUTPATIENT)
Dept: RADIOLOGY | Facility: MEDICAL CENTER | Age: 44
End: 2024-11-07
Payer: MEDICARE

## 2024-11-07 ENCOUNTER — HOSPITAL ENCOUNTER (EMERGENCY)
Facility: MEDICAL CENTER | Age: 44
End: 2024-11-07
Attending: EMERGENCY MEDICINE
Payer: MEDICARE

## 2024-11-07 VITALS
DIASTOLIC BLOOD PRESSURE: 81 MMHG | HEART RATE: 81 BPM | TEMPERATURE: 98.4 F | WEIGHT: 186.73 LBS | BODY MASS INDEX: 35.25 KG/M2 | HEIGHT: 61 IN | SYSTOLIC BLOOD PRESSURE: 121 MMHG | OXYGEN SATURATION: 97 % | RESPIRATION RATE: 16 BRPM

## 2024-11-07 DIAGNOSIS — R07.89 OTHER CHEST PAIN: ICD-10-CM

## 2024-11-07 DIAGNOSIS — R07.89 CHEST WALL PAIN: ICD-10-CM

## 2024-11-07 LAB
ALBUMIN SERPL BCP-MCNC: 4.3 G/DL (ref 3.2–4.9)
ALBUMIN/GLOB SERPL: 1.4 G/DL
ALP SERPL-CCNC: 80 U/L (ref 30–99)
ALT SERPL-CCNC: <5 U/L (ref 2–50)
ANION GAP SERPL CALC-SCNC: 11 MMOL/L (ref 7–16)
AST SERPL-CCNC: 22 U/L (ref 12–45)
BASOPHILS # BLD AUTO: 0.6 % (ref 0–1.8)
BASOPHILS # BLD: 0.07 K/UL (ref 0–0.12)
BILIRUB SERPL-MCNC: 0.5 MG/DL (ref 0.1–1.5)
BUN SERPL-MCNC: 12 MG/DL (ref 8–22)
CALCIUM ALBUM COR SERPL-MCNC: 9.3 MG/DL (ref 8.5–10.5)
CALCIUM SERPL-MCNC: 9.5 MG/DL (ref 8.5–10.5)
CHLORIDE SERPL-SCNC: 104 MMOL/L (ref 96–112)
CO2 SERPL-SCNC: 26 MMOL/L (ref 20–33)
CREAT SERPL-MCNC: 0.73 MG/DL (ref 0.5–1.4)
D DIMER PPP IA.FEU-MCNC: <0.27 UG/ML (FEU) (ref 0–0.5)
EKG IMPRESSION: NORMAL
EOSINOPHIL # BLD AUTO: 0.07 K/UL (ref 0–0.51)
EOSINOPHIL NFR BLD: 0.6 % (ref 0–6.9)
ERYTHROCYTE [DISTWIDTH] IN BLOOD BY AUTOMATED COUNT: 46.4 FL (ref 35.9–50)
GFR SERPLBLD CREATININE-BSD FMLA CKD-EPI: 102 ML/MIN/1.73 M 2
GLOBULIN SER CALC-MCNC: 3 G/DL (ref 1.9–3.5)
GLUCOSE SERPL-MCNC: 86 MG/DL (ref 65–99)
HCG SERPL QL: NEGATIVE
HCT VFR BLD AUTO: 47.7 % (ref 37–47)
HGB BLD-MCNC: 15.3 G/DL (ref 12–16)
IMM GRANULOCYTES # BLD AUTO: 0.03 K/UL (ref 0–0.11)
IMM GRANULOCYTES NFR BLD AUTO: 0.3 % (ref 0–0.9)
LYMPHOCYTES # BLD AUTO: 2.54 K/UL (ref 1–4.8)
LYMPHOCYTES NFR BLD: 23.3 % (ref 22–41)
MCH RBC QN AUTO: 30.6 PG (ref 27–33)
MCHC RBC AUTO-ENTMCNC: 32.1 G/DL (ref 32.2–35.5)
MCV RBC AUTO: 95.4 FL (ref 81.4–97.8)
MONOCYTES # BLD AUTO: 0.65 K/UL (ref 0–0.85)
MONOCYTES NFR BLD AUTO: 6 % (ref 0–13.4)
NEUTROPHILS # BLD AUTO: 7.54 K/UL (ref 1.82–7.42)
NEUTROPHILS NFR BLD: 69.2 % (ref 44–72)
NRBC # BLD AUTO: 0 K/UL
NRBC BLD-RTO: 0 /100 WBC (ref 0–0.2)
NT-PROBNP SERPL IA-MCNC: 142 PG/ML (ref 0–125)
PLATELET # BLD AUTO: 406 K/UL (ref 164–446)
PMV BLD AUTO: 9.5 FL (ref 9–12.9)
POTASSIUM SERPL-SCNC: 4 MMOL/L (ref 3.6–5.5)
PROT SERPL-MCNC: 7.3 G/DL (ref 6–8.2)
RBC # BLD AUTO: 5 M/UL (ref 4.2–5.4)
SODIUM SERPL-SCNC: 141 MMOL/L (ref 135–145)
TROPONIN T SERPL-MCNC: <6 NG/L (ref 6–19)
WBC # BLD AUTO: 10.9 K/UL (ref 4.8–10.8)

## 2024-11-07 PROCEDURE — 93005 ELECTROCARDIOGRAM TRACING: CPT | Performed by: EMERGENCY MEDICINE

## 2024-11-07 PROCEDURE — 84703 CHORIONIC GONADOTROPIN ASSAY: CPT

## 2024-11-07 PROCEDURE — 85025 COMPLETE CBC W/AUTO DIFF WBC: CPT

## 2024-11-07 PROCEDURE — 36415 COLL VENOUS BLD VENIPUNCTURE: CPT

## 2024-11-07 PROCEDURE — 700102 HCHG RX REV CODE 250 W/ 637 OVERRIDE(OP): Mod: UD | Performed by: EMERGENCY MEDICINE

## 2024-11-07 PROCEDURE — 85379 FIBRIN DEGRADATION QUANT: CPT

## 2024-11-07 PROCEDURE — 93005 ELECTROCARDIOGRAM TRACING: CPT

## 2024-11-07 PROCEDURE — 80053 COMPREHEN METABOLIC PANEL: CPT

## 2024-11-07 PROCEDURE — 71045 X-RAY EXAM CHEST 1 VIEW: CPT

## 2024-11-07 PROCEDURE — 83880 ASSAY OF NATRIURETIC PEPTIDE: CPT

## 2024-11-07 PROCEDURE — A9270 NON-COVERED ITEM OR SERVICE: HCPCS | Mod: UD | Performed by: EMERGENCY MEDICINE

## 2024-11-07 PROCEDURE — 99284 EMERGENCY DEPT VISIT MOD MDM: CPT

## 2024-11-07 PROCEDURE — 84484 ASSAY OF TROPONIN QUANT: CPT

## 2024-11-07 RX ORDER — ASPIRIN 325 MG
325 TABLET ORAL ONCE
Status: COMPLETED | OUTPATIENT
Start: 2024-11-07 | End: 2024-11-07

## 2024-11-07 RX ADMIN — ASPIRIN 325 MG: 325 TABLET ORAL at 15:11

## 2024-11-07 ASSESSMENT — FIBROSIS 4 INDEX: FIB4 SCORE: 0.46

## 2024-11-07 ASSESSMENT — HEART SCORE
RISK FACTORS: 1-2 RISK FACTORS
AGE: 45-64

## 2024-11-07 NOTE — ED PROVIDER NOTES
ED Provider Note    CHIEF COMPLAINT  Chief Complaint   Patient presents with    Chest Pain         EXTERNAL RECORDS REVIEWED  Hospitalist progress note reviewed from September 1.  Patient was admitted for abdominal pain nausea and jaundice.  She had an MRI and HIDA scan of the gallbladder without a diagnosis.  She was treated conservatively.    She had a CT negative for PE in July 2023.    She had a normal echocardiogram in 2019 with an EF of 65%.    HPI    Erin Santiago is a 44 y.o. female who presents to the Emergency Department by ambulance for a week of constant chest pain right of the sternum.  It is nonradiating and not associated with nausea sweats or shortness of breath.  It is pleuritic.  Denies a history of fever cough leg swelling or calf pain.  No prior DVT or PE.  No family history of thromboembolic disease or coronary artery disease.  She has diabetes but denies hypertension or dyslipidemia.    LIMITATION TO HISTORY   None    OUTSIDE HISTORIAN(S):  Her caregiver provided the history that she did not tell anyone about her pain the first 6 days.  The pain was only known once the patient reported at work today    REVIEW OF SYSTEMS  Pertinent positives include: Chest pain.  Pertinent negatives include: Abdominal pain.      PAST MEDICAL HISTORY  Past Medical History:   Diagnosis Date    Anxiety     Depression     depression     Diabetes     dx 2013 blood sugars not checked at home    Epilepsy (HCC)     seizure free    Mental disability     Sleep apnea     no cpap medicaid took it away due to non compliance    Snoring     Unspecified urinary incontinence     behavioral       FAMILY HISTORY  No coronary artery disease or thromboembolic disease    SOCIAL HISTORY  Social History     Tobacco Use    Smoking status: Former     Types: Cigarettes    Smokeless tobacco: Never   Vaping Use    Vaping status: Never Used   Substance Use Topics    Alcohol use: No    Drug use: No     Social History     Substance and  "Sexual Activity   Drug Use No       SURGICAL HISTORY  Past Surgical History:   Procedure Laterality Date    LESION EXCISION GENERAL  2/20/2014    Performed by Lemuel Fernandez M.D. at SURGERY SAME DAY [facility]       CURRENT MEDICATIONS  No current facility-administered medications for this encounter.    Current Outpatient Medications:     metFORMIN ER (GLUCOPHAGE XR) 500 MG TABLET SR 24 HR, TAKE 1 TABLET BY MOUTH ONCE DAILY FOR PRE-DIABETES., Disp: 30 Tablet, Rfl: 3    thiamine (TRUE VITAMIN B1) 100 MG Tab, Take 1 Tablet by mouth every day., Disp: 30 Tablet, Rfl: 3    Multiple Vitamins-Minerals (MULTIVITAMIN ADULT, MINERALS,) Tab, Take 1 Tablet by mouth every day., Disp: 30 Tablet, Rfl: 3    permethrin (ELIMITE) 5 % Cream, Apply to affected areas topically from head to toe, leave on overnight, and wash off in the morning.  Repeat in 1 week, Disp: 60 g, Rfl: 0    cetirizine (ZYRTEC) 10 MG Tab, Take 1 Tablet by mouth every day., Disp: 30 Tablet, Rfl: 0    Incontinence Supply Disposable (PREVAIL SUPER PLUS XLARGE) Misc, WEAR DAILY, CHANGE AS NEEDED., Disp: 98 Each, Rfl: 3    lamoTRIgine (LAMICTAL) 25 MG Tab, Take 1 Tab by mouth every day., Disp: 90 Tab, Rfl: 1    carbidopa-levodopa (SINEMET)  MG Tab, Take 1 Tab by mouth 3 times a day., Disp: 270 Tab, Rfl: 1    levETIRAcetam (KEPPRA) 500 MG Tab, Take 1 Tab by mouth 3 times a day., Disp: 270 Tab, Rfl: 1    ziprasidone (GEODON) 60 MG Cap, Take 60 mg by mouth every evening., Disp: , Rfl:     ALLERGIES  Allergies   Allergen Reactions    Acetaminophen      Unknown per patient and caregiver    Other Misc      bees    Penicillins Hives     Per patien    Shellfish Allergy Hives     Get hives per patient       PHYSICAL EXAM  VITAL SIGNS: /76   Pulse 84   Temp 36.8 °C (98.3 °F) (Temporal)   Resp 18   Ht 1.549 m (5' 1\")   Wt 84.7 kg (186 lb 11.7 oz)   SpO2 97%   BMI 35.28 kg/m²   Reviewed and afebrile and normal  Constitutional: Well developed, Well " nourished, well-appearing.  HENT: Normocephalic, atraumatic, bilateral external ears normal, No intraoral erythema, edema, exudate  Eyes: PERRLA, conjunctiva pink, no scleral icterus.   Cardiovascular: Regular rate and rhythm. No murmurs, rubs or gallops.  No dependent edema or calf tenderness  Respiratory: Lungs clear to auscultation bilaterally. No wheezes, rales, or rhonchi.  Abdominal:  Abdomen soft, non-tender, non distended. No rebound, or guarding.    Skin: No erythema, no rash. No wounds or bruising.  Genitourinary: No costovertebral angle tenderness.   Musculoskeletal: no deformities.   Neurologic: Alert & oriented x 3, cranial nerves 2-12 intact by passive exam.  Moves 4 limbs with symmetric strength.  Psychiatric: Affect normal, Judgment normal, Mood normal.     LABS Ordered and Reviewed by Me:  CBC with Differential    Collection Time: 11/07/24  2:17 PM   Result Value Ref Range    WBC 10.9 (H) 4.8 - 10.8 K/uL    RBC 5.00 4.20 - 5.40 M/uL    Hemoglobin 15.3 12.0 - 16.0 g/dL    Hematocrit 47.7 (H) 37.0 - 47.0 %    MCV 95.4 81.4 - 97.8 fL    MCH 30.6 27.0 - 33.0 pg    MCHC 32.1 (L) 32.2 - 35.5 g/dL    RDW 46.4 35.9 - 50.0 fL    Platelet Count 406 164 - 446 K/uL    MPV 9.5 9.0 - 12.9 fL    Neutrophils-Polys 69.20 44.00 - 72.00 %    Lymphocytes 23.30 22.00 - 41.00 %    Monocytes 6.00 0.00 - 13.40 %    Eosinophils 0.60 0.00 - 6.90 %    Basophils 0.60 0.00 - 1.80 %    Immature Granulocytes 0.30 0.00 - 0.90 %    Nucleated RBC 0.00 0.00 - 0.20 /100 WBC    Neutrophils (Absolute) 7.54 (H) 1.82 - 7.42 K/uL    Lymphs (Absolute) 2.54 1.00 - 4.80 K/uL    Monos (Absolute) 0.65 0.00 - 0.85 K/uL    Eos (Absolute) 0.07 0.00 - 0.51 K/uL    Baso (Absolute) 0.07 0.00 - 0.12 K/uL    Immature Granulocytes (abs) 0.03 0.00 - 0.11 K/uL    NRBC (Absolute) 0.00 K/uL   Complete Metabolic Panel (CMP)    Collection Time: 11/07/24  2:17 PM   Result Value Ref Range    Sodium 141 135 - 145 mmol/L    Potassium 4.0 3.6 - 5.5 mmol/L     Chloride 104 96 - 112 mmol/L    Co2 26 20 - 33 mmol/L    Anion Gap 11.0 7.0 - 16.0    Glucose 86 65 - 99 mg/dL    Bun 12 8 - 22 mg/dL    Creatinine 0.73 0.50 - 1.40 mg/dL    Calcium 9.5 8.5 - 10.5 mg/dL    Correct Calcium 9.3 8.5 - 10.5 mg/dL    AST(SGOT) 22 12 - 45 U/L    ALT(SGPT) <5 2 - 50 U/L    Alkaline Phosphatase 80 30 - 99 U/L    Total Bilirubin 0.5 0.1 - 1.5 mg/dL    Albumin 4.3 3.2 - 4.9 g/dL    Total Protein 7.3 6.0 - 8.2 g/dL    Globulin 3.0 1.9 - 3.5 g/dL    A-G Ratio 1.4 g/dL   proBrain Natriuretic Peptide, NT (BNP_    Collection Time: 11/07/24  2:17 PM   Result Value Ref Range    NT-proBNP 142 (H) 0 - 125 pg/mL   Troponins NOW    Collection Time: 11/07/24  2:17 PM   Result Value Ref Range    Troponin T <6 6 - 19 ng/L   HCG Qual Serum    Collection Time: 11/07/24  2:17 PM   Result Value Ref Range    Beta-Hcg Qualitative Serum Negative Negative   D-DIMER    Collection Time: 11/07/24  2:17 PM   Result Value Ref Range    D-Dimer <0.27 0.00 - 0.50 ug/mL (FEU)   ESTIMATED GFR    Collection Time: 11/07/24  2:17 PM   Result Value Ref Range    GFR (CKD-EPI) 102 >60 mL/min/1.73 m 2       Interpretations:    Pulse Ox: Normal at 97% on room air    Rhythm Strip: Interpretation by me     EKG Interpretation by me    Indication: Chest pain    Rhythm: normal sinus   Rate: Normal at 84  Axis: normal  Ectopy: none  Conduction: normal  ST/T Waves: no acute change  Q Waves: none  R Wave progression: normal  Hypertrophy changes: Absent    Comparison: Unchanged from prior    Clinical Impression: Normal sinus rhythm    RADIOLOGY  I have independently interpreted the chest x-ray associated with this visit demonstrating no pneumonia.  I am awaiting the final reading from the radiologist.     Final Radiology Report  DX-CHEST-PORTABLE (1 VIEW)   Final Result      Mild blunting of the right costophrenic angle probably related to atelectasis. Cannot exclude trace right pleural effusion.        Radiologist interpretation have been  reviewed by me.     ED COURSE:      INTERVENTIONS BY ME:  Medications   aspirin (Asa) tablet 325 mg (has no administration in time range)       ASSESSMENT, COURSE AND PLAN:  PROBLEMS EVALUATED THIS VISIT:    Well-appearing patient at low risk for coronary artery disease by heart score low risk for PE presents with a week of constant chest pain reproducible on exam.  There is no evidence of PE based on negative D-dimer.  There is no evidence of myocardial ischemia based on EKG and troponin.  Since she has had continuous pain for a week 1 troponin is sufficient to exclude coronary artery disease.  There is no pneumonia or pneumothorax.  This is unlikely to be related to her prior admission for abdominal pain.    Measures: Heart Score 2 which is low risk    DISPOSITION AND DISCUSSIONS    RISK:  Low     PLAN:  NSAIDs and acetaminophen    Nonspecific chest pain handout given    Return for changing pain shortness of breath leg swelling    Followup:  Parvin Hsu M.D.  [address] [place] [id number] [phone]    Schedule an appointment as soon as possible for a visit   As needed if still having pain Monday and into next week      CONDITION: Stable.     FINAL IMPRESSION  1. Other chest pain    2. Chest wall pain         Fredrick Lehman M.D., 11/07/24 3:43 PM

## 2024-11-07 NOTE — ED TRIAGE NOTES
Patient resides in a group home, she was working at her job at [employer] when she told them about her pain this afternoon, they then called 911.

## 2024-11-07 NOTE — ED TRIAGE NOTES
ALEX WONG, pt from a group home, pt had unwitnessed fall in the shower at 1300, pt states she tripped and fell, states she had positive LOC, unknown amount of time, c/o HA   
[Visual inspection, sensory exam] : Foot exam, including visual inspection, sensory exam with mono filament, and pulse exam, was performed within the last 12 months

## 2024-11-07 NOTE — ED TRIAGE NOTES
Chief Complaint   Patient presents with    Chest Pain     BIB EMS to triage w/ c/o substernal chest pain x 1 week.  No distress noted.

## 2024-11-07 NOTE — DISCHARGE INSTRUCTIONS
We could not find a dangerous cause of your pain.  There is no evidence of heart attack, blood clot, pneumonia or lung injury.  This appears to be a chest wall pain.  Take ibuprofen 600 mg every 6 hours unless it upsets the stomach and add Tylenol as needed for pain.  If pain continues next week follow-up with your doctor.  Return to the ER for shortness of breath leg swelling or other new symptoms.

## 2024-11-14 ENCOUNTER — OFFICE VISIT (OUTPATIENT)
Dept: MEDICAL GROUP | Facility: CLINIC | Age: 44
End: 2024-11-14
Payer: MEDICARE

## 2024-11-14 VITALS
TEMPERATURE: 97.7 F | WEIGHT: 196 LBS | DIASTOLIC BLOOD PRESSURE: 86 MMHG | OXYGEN SATURATION: 96 % | HEART RATE: 84 BPM | SYSTOLIC BLOOD PRESSURE: 122 MMHG | BODY MASS INDEX: 37 KG/M2 | HEIGHT: 61 IN

## 2024-11-14 DIAGNOSIS — Z87.19 H/O ACUTE CHOLANGITIS: ICD-10-CM

## 2024-11-14 PROCEDURE — 3074F SYST BP LT 130 MM HG: CPT | Mod: GC | Performed by: BEHAVIOR ANALYST

## 2024-11-14 PROCEDURE — 99214 OFFICE O/P EST MOD 30 MIN: CPT | Mod: GC | Performed by: BEHAVIOR ANALYST

## 2024-11-14 PROCEDURE — 3079F DIAST BP 80-89 MM HG: CPT | Mod: GC | Performed by: BEHAVIOR ANALYST

## 2024-11-14 ASSESSMENT — FIBROSIS 4 INDEX: FIB4 SCORE: 1.18

## 2024-11-14 NOTE — PROGRESS NOTES
"Subjective:     CC: ED f/u    HPI:   Erin is a 44 y.o. female who presents today for ED f/u; pt has hx of intellectual disability (altho cognizant of medical care presents w/ care giver from her group home), parkinson's f/b neurology annually, who recently visited the ED for CP. Her w/u was unremarkable for cardiopulmonary etiology. She states her cp started when she smelled some cigarette smoke from the neighbors. Since d/c from ED she feels well and has no complaints.      #chest pain (resolved)  #parkinson's  #seizure disorder hx  #intellectual disability  #hx of cholangitis    -pt doing well w/ no complaints  -I reviewed pt's ED work up and current stable presentation and don't recc any further work up at this time  -she is accompanied by a care giver who states pt take meds consistently and f/u w/ neurology  -was planning to f/u w/ gen srx d/t hx of cholangitis, but given recent ED visit pt's gayaminidians to re-eval plan of cholecystectomy, info for auth gen srx ref provided to pt's care giver      All plans of care were fully discussed with the patient and shared-decision making was utilized to conclude best course of actions in patient's medical management.    ROS: See HPI.     Objective:     Exam:  /86 (BP Location: Left arm, Patient Position: Sitting, BP Cuff Size: Large adult)   Pulse 84   Temp 36.5 °C (97.7 °F) (Temporal)   Ht 1.549 m (5' 1\")   Wt 88.9 kg (196 lb)   SpO2 96%   BMI 37.03 kg/m²  Body mass index is 37.03 kg/m².    Physical Exam:  General: Pt resting in NAD, cooperative   Skin:  No cyanosis or jaundice   HEENT: NC/AT. EOMI. No conjunctival injection or sclera icterus.   Lungs:  CTAB, good air movement. Non-labored.   Cardiovascular:  S1/S2 RRR   Abdomen:  Abdomen is soft, non-tender, non-distended, +BS  Extremities:  No LE edema   CNS:  No gross focal neurologic deficits  Psych: Appropriate mood and affect       Assessment & Plan:     See A/P under Subjective Section above    Problem " List Items Addressed This Visit    None

## 2024-12-02 NOTE — ED NOTES
English
Patient transported to imaging  
Pt ambulated to wheelchair and then wheeled to caregivers car.  Pt left campus without change or incident.  
none

## 2024-12-13 ENCOUNTER — OFFICE VISIT (OUTPATIENT)
Dept: MEDICAL GROUP | Facility: CLINIC | Age: 44
End: 2024-12-13
Payer: MEDICARE

## 2024-12-13 VITALS
RESPIRATION RATE: 16 BRPM | OXYGEN SATURATION: 94 % | BODY MASS INDEX: 36.52 KG/M2 | SYSTOLIC BLOOD PRESSURE: 130 MMHG | WEIGHT: 186 LBS | HEIGHT: 60 IN | HEART RATE: 83 BPM | TEMPERATURE: 97.2 F | DIASTOLIC BLOOD PRESSURE: 80 MMHG

## 2024-12-13 DIAGNOSIS — Z00.00 PREVENTATIVE HEALTH CARE: ICD-10-CM

## 2024-12-13 DIAGNOSIS — H61.22 IMPACTED CERUMEN OF LEFT EAR: ICD-10-CM

## 2024-12-13 DIAGNOSIS — Z00.00 ANNUAL PHYSICAL EXAM: ICD-10-CM

## 2024-12-13 PROCEDURE — 3075F SYST BP GE 130 - 139MM HG: CPT

## 2024-12-13 PROCEDURE — 3079F DIAST BP 80-89 MM HG: CPT

## 2024-12-13 PROCEDURE — 99212 OFFICE O/P EST SF 10 MIN: CPT | Mod: GE

## 2024-12-13 ASSESSMENT — FIBROSIS 4 INDEX: FIB4 SCORE: 1.18

## 2024-12-13 ASSESSMENT — PATIENT HEALTH QUESTIONNAIRE - PHQ9: CLINICAL INTERPRETATION OF PHQ2 SCORE: 3

## 2024-12-13 NOTE — ASSESSMENT & PLAN NOTE
Patient due for Pap smear and colon cancer screening.  Patient agreeable to discuss at next visit.

## 2024-12-13 NOTE — ASSESSMENT & PLAN NOTE
Erin is a 43-year-old female with significant past medical history of epilepsy, bipolar 1 disorder, Parkinson's and intellectual disability, presenting for her annual exam.  She has no acute concerns for today's visit and neither does her caregiver.  She continues to tolerate  all of her medications well and does not need any refills at this time.  Physical exam was reassuring and only notable for left ear cerumen burden.      Plan:  - Patient would like to defer cerumen extraction at this time and will follow-up at her next availability to have this addressed.

## 2024-12-13 NOTE — ASSESSMENT & PLAN NOTE
Moderate, not affecting hearing at this time.  Patient would like to defer extraction at this time.    Plan:  - Counseled patient and caregiver on avoiding use of Q-tips  - Patient agreeable to try olive oil drops at home pending her follow-up appointment to have cerumen extracted.

## 2024-12-13 NOTE — PROGRESS NOTES
Subjective:     CC:   Chief Complaint   Patient presents with    Annual Exam     Annual medicare        HPI:   Erin presents today for annual wellness exam.  Erin is here with her caregiver and they both deny any acute concerns for today's visit.  She continues to tolerate all of her medications and takes them all as prescribed.  I am is very excited today because she gets to go [employer] shopping after her appointment today.    Current Outpatient Medications Ordered in Epic   Medication Sig Dispense Refill    metFORMIN ER (GLUCOPHAGE XR) 500 MG TABLET SR 24 HR TAKE 1 TABLET BY MOUTH ONCE DAILY FOR PRE-DIABETES. 30 Tablet 3    thiamine (TRUE VITAMIN B1) 100 MG Tab Take 1 Tablet by mouth every day. 30 Tablet 3    Multiple Vitamins-Minerals (MULTIVITAMIN ADULT, MINERALS,) Tab Take 1 Tablet by mouth every day. 30 Tablet 3    permethrin (ELIMITE) 5 % Cream Apply to affected areas topically from head to toe, leave on overnight, and wash off in the morning.  Repeat in 1 week 60 g 0    cetirizine (ZYRTEC) 10 MG Tab Take 1 Tablet by mouth every day. 30 Tablet 0    Incontinence Supply Disposable (PREVAIL SUPER PLUS XLARGE) Misc WEAR DAILY, CHANGE AS NEEDED. 98 Each 3    lamoTRIgine (LAMICTAL) 25 MG Tab Take 1 Tab by mouth every day. 90 Tab 1    carbidopa-levodopa (SINEMET)  MG Tab Take 1 Tab by mouth 3 times a day. 270 Tab 1    levETIRAcetam (KEPPRA) 500 MG Tab Take 1 Tab by mouth 3 times a day. 270 Tab 1    ziprasidone (GEODON) 60 MG Cap Take 60 mg by mouth every evening.       No current Epic-ordered facility-administered medications on file.       ROS:  Negative except for above in HPI    Objective:     Exam:  /80 (BP Location: Left arm, Patient Position: Sitting, BP Cuff Size: Adult)   Pulse 83   Temp 36.2 °C (97.2 °F) (Temporal)   Resp 16   Ht 1.524 m (5')   Wt 84.4 kg (186 lb)   SpO2 94%   BMI 36.33 kg/m²  Body mass index is 36.33 kg/m².    Gen: Pleasant, well-appearing, no acute distress,  intellectual disability  HEENT: NCAT, MMM, No lymphadenopathy, left TM not visualized due to large cerumen burden, right TM intact, neck supple, thyroid not enlarged  Lungs: Normal effort, CTA bilaterally, no wheezes, rhonchi, or rales  CV: Regular rate and rhythm. No murmurs, rubs, or gallops. Radial pulses palpable bilat  Abd: Soft, non-distended, no guarding, no rebound, non-tender to palpation  Ext: No clubbing, cyanosis, edema.  Neuro: Non-focal      Assessment & Plan:     44 y.o. female with the following -     Problem List Items Addressed This Visit       Annual physical exam     Erin is a 43-year-old female with significant past medical history of epilepsy, bipolar 1 disorder, Parkinson's and intellectual disability, presenting for her annual exam.  She has no acute concerns for today's visit and neither does her caregiver.  She continues to tolerate  all of her medications well and does not need any refills at this time.  Physical exam was reassuring and only notable for left ear cerumen burden.      Plan:  - Patient would like to defer cerumen extraction at this time and will follow-up at her next availability to have this addressed.             Impacted cerumen of left ear     Moderate, not affecting hearing at this time.  Patient would like to defer extraction at this time.    Plan:  - Counseled patient and caregiver on avoiding use of Q-tips  - Patient agreeable to try olive oil drops at home pending her follow-up appointment to have cerumen extracted.         Preventative health care     Patient due for Pap smear and colon cancer screening.  Patient agreeable to discuss at next visit.               Return in about 4 weeks (around 1/10/2025), or if symptoms worsen or fail to improve.    Parvin Hsu M.D.

## 2025-01-09 RX ORDER — METFORMIN HYDROCHLORIDE 500 MG/1
TABLET, EXTENDED RELEASE ORAL
Qty: 90 TABLET | Refills: 3 | Status: SHIPPED | OUTPATIENT
Start: 2025-01-09

## 2025-01-09 RX ORDER — THIAMINE MONONITRATE (VIT B1) 100 MG
100 TABLET ORAL DAILY
Qty: 30 TABLET | Refills: 0 | Status: SHIPPED | OUTPATIENT
Start: 2025-01-09

## 2025-01-09 NOTE — TELEPHONE ENCOUNTER
Received request via: Pharmacy    Was the patient seen in the last year in this department? Yes    Does the patient have an active prescription (recently filled or refills available) for medication(s) requested? No    Pharmacy Name:   [facility] - [place], [place] - [address]       Does the patient have penitentiary Plus and need 100-day supply? (This applies to ALL medications) Patient does not have SCP

## 2025-02-24 NOTE — PROGRESS NOTES
Assumed pt care at 1900 and received bedside report.    Pt alert and oriented X 4. Pt denies chest pain, sob, nausea and vomiting, headache, and blurry or double vision. Pt denies numbness and tingling. Pt denies pain. Pt ambulating x1 hand held assist to restroom.   POC discussed and education provided on administered medications. All questions and concerns addressed. Fall precautions, hourly rounding and Q4 hour neuro checks in place.    Kvng Alcocer, APRN - CNP  [facility]ite  Caller: Unspecified (4 days ago,  9:07 AM)  Thank you, please let him know that once he resumes Eliquis in one week if he has recurrent bleeding to let us know. Additionally we will plan for Tikosyn admission once he has been on OAC for 3 weeks.    LVM for patient to contact office for recommendations per Kvng Alcocer APRN-CNP above.

## 2025-03-11 RX ORDER — THIAMINE MONONITRATE (VIT B1) 100 MG
100 TABLET ORAL DAILY
Qty: 30 TABLET | Refills: 0 | Status: SHIPPED | OUTPATIENT
Start: 2025-03-11

## 2025-03-11 NOTE — TELEPHONE ENCOUNTER
Received request via: Pharmacy    Was the patient seen in the last year in this department? Yes    Does the patient have an active prescription (recently filled or refills available) for medication(s) requested? No    [facility]    Does the patient have retirement Plus and need 100-day supply? (This applies to ALL medications) Patient does not have SCP

## 2025-03-13 NOTE — TELEPHONE ENCOUNTER
Received request via: Pharmacy    Was the patient seen in the last year in this department? Yes    Does the patient have an active prescription (recently filled or refills available) for medication(s) requested? No    [facility] Name: [facility]    Does the patient have snf Plus and need 100-day supply? (This applies to ALL medications) Patient does not have SCP

## 2025-03-26 ENCOUNTER — OFFICE VISIT (OUTPATIENT)
Dept: MEDICAL GROUP | Facility: CLINIC | Age: 45
End: 2025-03-26
Payer: MEDICARE

## 2025-03-26 VITALS
DIASTOLIC BLOOD PRESSURE: 68 MMHG | SYSTOLIC BLOOD PRESSURE: 112 MMHG | TEMPERATURE: 98.3 F | WEIGHT: 193 LBS | OXYGEN SATURATION: 97 % | HEART RATE: 78 BPM | BODY MASS INDEX: 36.44 KG/M2 | HEIGHT: 61 IN

## 2025-03-26 DIAGNOSIS — Z12.31 ENCOUNTER FOR SCREENING MAMMOGRAM FOR MALIGNANT NEOPLASM OF BREAST: ICD-10-CM

## 2025-03-26 DIAGNOSIS — Z12.12 SCREENING FOR COLORECTAL CANCER: ICD-10-CM

## 2025-03-26 DIAGNOSIS — Z00.00 ENCOUNTER FOR ANNUAL HEALTH EXAMINATION: ICD-10-CM

## 2025-03-26 DIAGNOSIS — Z12.11 SCREENING FOR COLORECTAL CANCER: ICD-10-CM

## 2025-03-26 ASSESSMENT — FIBROSIS 4 INDEX: FIB4 SCORE: 1.18

## 2025-03-26 NOTE — PROGRESS NOTES
"Subjective:     CC:   Chief Complaint   Patient presents with    Paperwork     Paperwork for [employer]       HPI:   Erin presents today brought in by her caretaker for her annual exam paperwork to be completed.  Patient has no specific complaints today.  She did recently have a cholecystectomy and reports no pain from this.  She does continue to take medications as prescribed and denies any issues with medications.    No problems updated.    Current Outpatient Medications Ordered in Epic   Medication Sig Dispense Refill    Multiple Vitamins-Minerals (MULTIVITAMIN ADULT, MINERALS,) Tab TAKE 1 TABLET BY MOUTH ONCE DAILY 30 Tablet 0    TRUE VITAMIN B1 100 MG Tab TAKE 1 TABLET BY MOUTH ONCE DAILY 30 Tablet 0    metFORMIN ER (GLUCOPHAGE XR) 500 MG TABLET SR 24 HR TAKE 1 TABLET BY MOUTH ONCE DAILY FOR PRE-DIABETES. 90 Tablet 3    permethrin (ELIMITE) 5 % Cream Apply to affected areas topically from head to toe, leave on overnight, and wash off in the morning.  Repeat in 1 week 60 g 0    cetirizine (ZYRTEC) 10 MG Tab Take 1 Tablet by mouth every day. 30 Tablet 0    Incontinence Supply Disposable (PREVAIL SUPER PLUS XLARGE) Misc WEAR DAILY, CHANGE AS NEEDED. 98 Each 3    lamoTRIgine (LAMICTAL) 25 MG Tab Take 1 Tab by mouth every day. 90 Tab 1    carbidopa-levodopa (SINEMET)  MG Tab Take 1 Tab by mouth 3 times a day. 270 Tab 1    levETIRAcetam (KEPPRA) 500 MG Tab Take 1 Tab by mouth 3 times a day. 270 Tab 1    ziprasidone (GEODON) 60 MG Cap Take 60 mg by mouth every evening.       No current Epic-ordered facility-administered medications on file.       Health Maintenance: Completed    ROS:  See HPI    Objective:     Exam:  /68   Pulse 78   Temp 36.8 °C (98.3 °F)   Ht 1.549 m (5' 1\")   Wt 87.5 kg (193 lb)   SpO2 97%   BMI 36.47 kg/m²  Body mass index is 36.47 kg/m².    Physical Exam  Vitals and nursing note reviewed.   Constitutional:       General: She is not in acute distress.  HENT:      " Mouth/Throat:      Mouth: Mucous membranes are moist.      Pharynx: No oropharyngeal exudate or posterior oropharyngeal erythema.   Eyes:      Conjunctiva/sclera: Conjunctivae normal.   Cardiovascular:      Rate and Rhythm: Normal rate and regular rhythm.   Pulmonary:      Effort: Pulmonary effort is normal.      Breath sounds: Normal breath sounds.   Abdominal:      Palpations: Abdomen is soft.      Tenderness: There is no abdominal tenderness.   Musculoskeletal:      Cervical back: Normal range of motion.   Skin:     General: Skin is warm.   Neurological:      Mental Status: She is alert. Mental status is at baseline.         Labs: No recent labs to review.    Assessment & Plan:     44 y.o. female with the following -     Assessment & Plan  Encounter for annual health examination  Patient presented today for annual physical exam paperwork to be filled out.  She had no specific questions or concerns.  Patient is doing well on current medications.  Patient is due preventive care and this was discussed with patient.  Attempted to call Reese Bartlett of [employer] and this went to voicemail.  Patient is due for mammogram for breast cancer screening, colorectal cancer screening, as well as cervical cancer screening.  Patient was given the orders for preventative care and voicemail was left for return call.       Encounter for screening mammogram for malignant neoplasm of breast  Last mammogram was 10/18/2023 which showed dense breast tissue but no radiographic evidence of malignancy with recommendation for repeat mammogram in 1 year.  Patient is due for repeat mammogram.  Order was given to patient today.  Attempted to call Reese Bartlett of [employer] and this went to voicemail.  Voicemail left for return call.  Orders:    MA-SCREENING MAMMO BILAT W/TOMOSYNTHESIS W/CAD; Future    Screening for colorectal cancer  Patient is due for colorectal cancer screening.  Has never had colorectal cancer screening.  This was discussed with  patient and attempted to call [place] and received voicemail.  Will attempt to call again to discuss preventative care and left voicemail for return call.  Orders:    Cologuard® colon cancer screening    Patient is due for cervical cancer screening.  Attempted to call [employer] of [employer] to further discuss and receive voicemail.  Left voicemail for return call.        Return for Annual.      Paulette Gomez MD  [facility]

## 2025-04-04 RX ORDER — THIAMINE MONONITRATE (VIT B1) 100 MG
100 TABLET ORAL DAILY
Qty: 30 TABLET | Refills: 0 | Status: SHIPPED | OUTPATIENT
Start: 2025-04-04

## 2025-04-04 NOTE — TELEPHONE ENCOUNTER
Received request via: Pharmacy    Was the patient seen in the last year in this department? Yes    Does the patient have an active prescription (recently filled or refills available) for medication(s) requested? No    Pharmacy Name: [facility] - [place], [place] - [address]     Does the patient have residential Plus and need 100-day supply? (This applies to ALL medications) Patient does not have SCP

## 2025-04-10 ENCOUNTER — HOSPITAL ENCOUNTER (OUTPATIENT)
Dept: RADIOLOGY | Facility: MEDICAL CENTER | Age: 45
End: 2025-04-10
Attending: STUDENT IN AN ORGANIZED HEALTH CARE EDUCATION/TRAINING PROGRAM
Payer: MEDICARE

## 2025-04-10 DIAGNOSIS — Z12.31 ENCOUNTER FOR SCREENING MAMMOGRAM FOR MALIGNANT NEOPLASM OF BREAST: ICD-10-CM

## 2025-04-10 PROCEDURE — 77067 SCR MAMMO BI INCL CAD: CPT

## 2025-04-10 NOTE — TELEPHONE ENCOUNTER
Received request via: Pharmacy    Was the patient seen in the last year in this department? Yes    Does the patient have an active prescription (recently filled or refills available) for medication(s) requested? No    [facility]    Does the patient have CHCF Plus and need 100-day supply? (This applies to ALL medications) Patient does not have SCP

## 2025-04-14 ENCOUNTER — RESULTS FOLLOW-UP (OUTPATIENT)
Dept: MEDICAL GROUP | Facility: CLINIC | Age: 45
End: 2025-04-14

## 2025-04-16 LAB — NONINV COLON CA DNA+OCC BLD SCRN STL QL: NEGATIVE

## 2025-04-18 ENCOUNTER — RESULTS FOLLOW-UP (OUTPATIENT)
Dept: MEDICAL GROUP | Facility: CLINIC | Age: 45
End: 2025-04-18

## 2025-04-24 ENCOUNTER — HOSPITAL ENCOUNTER (EMERGENCY)
Facility: MEDICAL CENTER | Age: 45
End: 2025-04-24
Attending: STUDENT IN AN ORGANIZED HEALTH CARE EDUCATION/TRAINING PROGRAM
Payer: COMMERCIAL

## 2025-04-24 ENCOUNTER — APPOINTMENT (OUTPATIENT)
Dept: RADIOLOGY | Facility: MEDICAL CENTER | Age: 45
End: 2025-04-24
Payer: COMMERCIAL

## 2025-04-24 ENCOUNTER — APPOINTMENT (OUTPATIENT)
Dept: RADIOLOGY | Facility: MEDICAL CENTER | Age: 45
End: 2025-04-24
Attending: STUDENT IN AN ORGANIZED HEALTH CARE EDUCATION/TRAINING PROGRAM
Payer: COMMERCIAL

## 2025-04-24 VITALS
BODY MASS INDEX: 36.55 KG/M2 | HEART RATE: 74 BPM | OXYGEN SATURATION: 95 % | RESPIRATION RATE: 20 BRPM | HEIGHT: 61 IN | TEMPERATURE: 98 F | SYSTOLIC BLOOD PRESSURE: 122 MMHG | WEIGHT: 193.56 LBS | DIASTOLIC BLOOD PRESSURE: 72 MMHG

## 2025-04-24 DIAGNOSIS — R07.9 CHEST PAIN, UNSPECIFIED TYPE: ICD-10-CM

## 2025-04-24 LAB
ALBUMIN SERPL BCP-MCNC: 4.2 G/DL (ref 3.2–4.9)
ALBUMIN/GLOB SERPL: 1.4 G/DL
ALP SERPL-CCNC: 70 U/L (ref 30–99)
ALT SERPL-CCNC: <5 U/L (ref 2–50)
ANION GAP SERPL CALC-SCNC: 12 MMOL/L (ref 7–16)
AST SERPL-CCNC: 21 U/L (ref 12–45)
BASOPHILS # BLD AUTO: 0.9 % (ref 0–1.8)
BASOPHILS # BLD: 0.1 K/UL (ref 0–0.12)
BILIRUB SERPL-MCNC: 0.4 MG/DL (ref 0.1–1.5)
BUN SERPL-MCNC: 10 MG/DL (ref 8–22)
CALCIUM ALBUM COR SERPL-MCNC: 9.2 MG/DL (ref 8.5–10.5)
CALCIUM SERPL-MCNC: 9.4 MG/DL (ref 8.5–10.5)
CHLORIDE SERPL-SCNC: 107 MMOL/L (ref 96–112)
CO2 SERPL-SCNC: 24 MMOL/L (ref 20–33)
CREAT SERPL-MCNC: 0.7 MG/DL (ref 0.5–1.4)
D DIMER PPP IA.FEU-MCNC: <0.27 UG/ML (FEU) (ref 0–0.5)
EKG IMPRESSION: NORMAL
EOSINOPHIL # BLD AUTO: 0.09 K/UL (ref 0–0.51)
EOSINOPHIL NFR BLD: 0.8 % (ref 0–6.9)
ERYTHROCYTE [DISTWIDTH] IN BLOOD BY AUTOMATED COUNT: 47.8 FL (ref 35.9–50)
GFR SERPLBLD CREATININE-BSD FMLA CKD-EPI: 107 ML/MIN/1.73 M 2
GLOBULIN SER CALC-MCNC: 3 G/DL (ref 1.9–3.5)
GLUCOSE SERPL-MCNC: 90 MG/DL (ref 65–99)
HCG SERPL QL: NEGATIVE
HCT VFR BLD AUTO: 46.1 % (ref 37–47)
HGB BLD-MCNC: 14.8 G/DL (ref 12–16)
IMM GRANULOCYTES # BLD AUTO: 0.03 K/UL (ref 0–0.11)
IMM GRANULOCYTES NFR BLD AUTO: 0.3 % (ref 0–0.9)
LYMPHOCYTES # BLD AUTO: 2.08 K/UL (ref 1–4.8)
LYMPHOCYTES NFR BLD: 17.8 % (ref 22–41)
MCH RBC QN AUTO: 30.8 PG (ref 27–33)
MCHC RBC AUTO-ENTMCNC: 32.1 G/DL (ref 32.2–35.5)
MCV RBC AUTO: 95.8 FL (ref 81.4–97.8)
MONOCYTES # BLD AUTO: 0.71 K/UL (ref 0–0.85)
MONOCYTES NFR BLD AUTO: 6.1 % (ref 0–13.4)
NEUTROPHILS # BLD AUTO: 8.68 K/UL (ref 1.82–7.42)
NEUTROPHILS NFR BLD: 74.1 % (ref 44–72)
NRBC # BLD AUTO: 0 K/UL
NRBC BLD-RTO: 0 /100 WBC (ref 0–0.2)
NT-PROBNP SERPL IA-MCNC: 62 PG/ML (ref 0–125)
PLATELET # BLD AUTO: 344 K/UL (ref 164–446)
PMV BLD AUTO: 10.8 FL (ref 9–12.9)
POTASSIUM SERPL-SCNC: 4.6 MMOL/L (ref 3.6–5.5)
PROT SERPL-MCNC: 7.2 G/DL (ref 6–8.2)
RBC # BLD AUTO: 4.81 M/UL (ref 4.2–5.4)
SODIUM SERPL-SCNC: 143 MMOL/L (ref 135–145)
TROPONIN T SERPL-MCNC: 7 NG/L (ref 6–19)
TROPONIN T SERPL-MCNC: <6 NG/L (ref 6–19)
WBC # BLD AUTO: 11.7 K/UL (ref 4.8–10.8)

## 2025-04-24 PROCEDURE — 700111 HCHG RX REV CODE 636 W/ 250 OVERRIDE (IP): Mod: JZ | Performed by: STUDENT IN AN ORGANIZED HEALTH CARE EDUCATION/TRAINING PROGRAM

## 2025-04-24 PROCEDURE — 85379 FIBRIN DEGRADATION QUANT: CPT

## 2025-04-24 PROCEDURE — 71045 X-RAY EXAM CHEST 1 VIEW: CPT

## 2025-04-24 PROCEDURE — 96374 THER/PROPH/DIAG INJ IV PUSH: CPT

## 2025-04-24 PROCEDURE — 84703 CHORIONIC GONADOTROPIN ASSAY: CPT

## 2025-04-24 PROCEDURE — 80053 COMPREHEN METABOLIC PANEL: CPT

## 2025-04-24 PROCEDURE — 99285 EMERGENCY DEPT VISIT HI MDM: CPT

## 2025-04-24 PROCEDURE — 36415 COLL VENOUS BLD VENIPUNCTURE: CPT

## 2025-04-24 PROCEDURE — 84484 ASSAY OF TROPONIN QUANT: CPT | Mod: 91

## 2025-04-24 PROCEDURE — 93005 ELECTROCARDIOGRAM TRACING: CPT | Mod: TC

## 2025-04-24 PROCEDURE — 85025 COMPLETE CBC W/AUTO DIFF WBC: CPT

## 2025-04-24 PROCEDURE — 93005 ELECTROCARDIOGRAM TRACING: CPT | Mod: TC | Performed by: STUDENT IN AN ORGANIZED HEALTH CARE EDUCATION/TRAINING PROGRAM

## 2025-04-24 PROCEDURE — 83880 ASSAY OF NATRIURETIC PEPTIDE: CPT

## 2025-04-24 RX ORDER — KETOROLAC TROMETHAMINE 15 MG/ML
15 INJECTION, SOLUTION INTRAMUSCULAR; INTRAVENOUS ONCE
Status: DISCONTINUED | OUTPATIENT
Start: 2025-04-24 | End: 2025-04-24

## 2025-04-24 RX ORDER — KETOROLAC TROMETHAMINE 15 MG/ML
15 INJECTION, SOLUTION INTRAMUSCULAR; INTRAVENOUS ONCE
Status: COMPLETED | OUTPATIENT
Start: 2025-04-24 | End: 2025-04-24

## 2025-04-24 RX ADMIN — KETOROLAC TROMETHAMINE 15 MG: 15 INJECTION, SOLUTION INTRAMUSCULAR; INTRAVENOUS at 18:22

## 2025-04-24 ASSESSMENT — HEART SCORE
HISTORY: SLIGHTLY SUSPICIOUS
HEART SCORE: 2
AGE: 45-64
ECG: NORMAL
RISK FACTORS: 1-2 RISK FACTORS
TROPONIN: LESS THAN OR EQUAL TO NORMAL LIMIT

## 2025-04-24 ASSESSMENT — FIBROSIS 4 INDEX: FIB4 SCORE: 1.18

## 2025-04-24 ASSESSMENT — PAIN DESCRIPTION - PAIN TYPE
TYPE: ACUTE PAIN

## 2025-04-24 NOTE — ED TRIAGE NOTES
Chief Complaint   Patient presents with    Chest Pain     Started 0500 with right sided chest pain. Patient states it is worse with inspiration.     Patient has slurred speech baseline with intellectual disabilities. Caregiver at bedside.     Hx of Parkinson, seizure disorder, DM and PTSD    Per EMS, , GCS 15, A+Ox4. Patient had gallbladder was removed 3/2025

## 2025-04-24 NOTE — ED NOTES
For any medical updates or further care (if admit or dc), contact [facility], after 1700 call [phone]

## 2025-04-24 NOTE — LETTER
"EMPLOYEE’S CLAIM FOR COMPENSATION/ REPORT OF INITIAL TREATMENT FORM C-4  EMPLOYEE’S CLAIM - PROVIDE ALL INFORMATION REQUESTED   First Name                    MARIA DOLORES Khan                  Last Name  Jack Birthdate                    1980                Sex  [x]Female Claim Number (Insurer’s Use Only)     Mailing Address  [facility] [address]  Age  44 y.o. Height  1.549 m (5' 1\") Weight  87.8 kg (193 lb 9 oz) Social Security Number     [facility]  [address] Telephone  [phone]   Email  [email or web address]    Primary Language Spoken  English    INSURER THIRD-PARTY   [employer]    Employee's Occupation (Job Title) When Injury or Occupational Disease Occurred Production    Employer's Name/Company Name [employer] Telephone [phone]    Office Mail Address (Number and Street) [address] [address] [place] [address]   Date of Injury (if applicable) 4/24/2025             Hours Injury (if applicable)  8:00 AM am    pm Date Employer Notified  4/24/2025 Last Day of Work after Injury or Occupational Disease  4/24/2025 Supervisor to Whom Injury Reported  Evelin   Address or Location of Accident (if applicable) Work [1]   What were you doing at the time of accident? (if applicable)working    How did this injury or occupational disease occur? (Be specific and answer in detail. Use additional sheet if necessary)  I was working and my chest hurt   If you believe that you have an occupational disease, when did you first have knowledge of the disability and its relationship to your employment? NONE Witnesses to the Accident (if applicable)  NONE      Nature of Injury or Occupational Disease  Workers' Compensation  Part(s) of Body Injured or Affected  Chest Heart N/A    I CERTIFY THAT THE ABOVE IS TRUE AND CORRECT TO T HE BEST OF MY KNOWLEDGE AND THAT I HAVE PROVIDED THIS INFORMATION IN ORDER TO OBTAIN THE " BENEFITS OF [place]CC[place], OR [place]).  I HEREBY AUTHORIZE ANY PHYSICIAN, CHIROPRACTOR, SURGEON, PRACTITIONER OR ANY OTHER PERSON, ANY HOSPITAL, INCLUDING [facility] OR [facility], ANY  MEDICAL SERVICE ORGANIZATION, ANY INSURANCE COMPANY, OR OTHER INSTITUTION OR ORGANIZATION TO RELEASE TO EACH OTHER, ANY MEDICAL OR OTHER INFORMATION, INCLUDING BENEFITS PAID OR PAYABLE, PERTINENT TO THIS INJURY OR DISEASE, EXCEPT INFORMATION RELATIVE TO DIAGNOSIS, TREATMENT AND/OR COUNSELING FOR AIDS, PSYCHOLOGICAL CONDITIONS, ALCOHOL OR CONTROLLED SUBSTANCES, FOR WHICH I MUST GIVE SPECIFIC AUTHORIZATION.  A PHOTOSTAT OF THIS AUTHORIZATION SHALL BE VALID AS THE ORIGINAL.     Date 04/24/25   Place [facility]  Employee’s Original or  *Electronic Signature   THIS REPORT MUST BE COMPLETED AND MAILED WITHIN 3 WORKING DAYS OF TREATMENT   Place  [facility], EMERGENCY DEPT    Name of Facility   [facility], ED    Date 4/24/2025 Diagnosis and Description of Injury or Occupational Disease  (R07.9) Chest pain, unspecified type  The encounter diagnosis was Chest pain, unspecified type. Is there evidence that the injured employee was under the influence of alcohol and/or another controlled substance at the time of accident?  [x]No  [] Yes (if yes, please explain)   Hour 1904  No   Treatment: Evaluation for chest pain Have you advised the patient to remain off work five days or more?   No  [] Yes  If yes, indicate dates: From_ _                                                      To __ _  [] No   If no, is the injured employee capable of: [x] full duty Yes   [] modified duty      If modified duty, specify any limitations / restrictions:__________________  ___ ___________________________     X-Ray Findings: Negative    From information given by the employee, together with medical evidence, can you directly  connect this injury or occupational disease as job incurred?  []Yes   [] No No    Is additional medical care by a physician indicated? [x]Yes [] No  Yes  Comments:Follow-up with PCP    Do you know of any previous injury or disease contributing to this condition or occupational disease? []Yes [x] No (Explain if yes)                          No   Date  4/24/2025 Print Health Care Provider’s Name  KIM CERNA D.O. I certify that the employer’s copy of  this form was delivered to the employer on:   Address   [address] [place] [address]  INSURER'S USE ONLY                       [facility]  Zip   [address] Provider’s Tax ID Number   [id number]   Telephone  Dept: [phone]    Health Care Provider’s Original or Electronic Signature      e-KIM Temple D.O.    Degree (MD,DO, DC,PA-C,APRN)     D.O.      ORIGINAL - TREATING HEALTHCARE PROVIDER PAGE 2 - INSURER/TPA PAGE 3 - EMPLOYER PAGE 4 - EMPLOYEE             Form C-4 (rev.02/25)

## 2025-04-25 NOTE — ED NOTES
Pt discharged, all appropriate hospital equipment removed (IV, monitor, pulse ox, etc.). Pt left unit via walking with Caregiver to vehicle for home. Personal belongings with pt when leaving unit. Pt given discharge instructions prior to leaving ER, including where to  prescriptions and when to follow-up if applicable; verbalizes understanding. Pt informed to return to ED if symptoms worsen/return or altered status develop. Copy of discharge instructions signed and turned into DC basket and copy sent with pt. DC paperwork faxed to [facility], f/u with PCP

## 2025-04-25 NOTE — ED PROVIDER NOTES
ED Provider Note    CHIEF COMPLAINT  Chief Complaint   Patient presents with    Chest Pain     Started 0500 with right sided chest pain. Patient states it is worse with inspiration.     Patient has slurred speech baseline with intellectual disabilities. Caregiver at bedside.       EXTERNAL RECORDS REVIEWED  Reviewed prior internal emergency department note November 2024 evaluated for reproducible chest pain at that time her workup was negative she was ultimately discharged home    HPI/ROS  LIMITATION TO HISTORY   Cognitive delay  OUTSIDE HISTORIAN(S):  Care provider providing clinically relevant collateral history    Erin Santiago is a 44 y.o. female with past medical history of diabetes presenting to the emergency department for right upper chest pain.  Symptoms started this morning at approximately 5:00 AM.  She says that it is worse when she takes deep breath.  She does endorse pain when she presses on her chest.  No inciting injury.  No fevers chills cough increased sputum production.  No prior history of heart attack.    PAST MEDICAL HISTORY   has a past medical history of Anxiety, Depression, Diabetes, Epilepsy (HCC), Mental disability, Sleep apnea, Snoring, and Unspecified urinary incontinence.    SURGICAL HISTORY   has a past surgical history that includes lesion excision general (2/20/2014).    FAMILY HISTORY  History reviewed. No pertinent family history.    SOCIAL HISTORY  Social History     Tobacco Use    Smoking status: Former     Types: Cigarettes    Smokeless tobacco: Never   Vaping Use    Vaping status: Never Used   Substance and Sexual Activity    Alcohol use: No    Drug use: No    Sexual activity: Never       CURRENT MEDICATIONS  Home Medications       Reviewed by Lana Melgoza R.N. (Registered Nurse) on 04/24/25 at 1417  Med List Status: Not Addressed     Medication Last Dose Status   carbidopa-levodopa (SINEMET)  MG Tab  Active   cetirizine (ZYRTEC) 10 MG Tab  Active  "  Incontinence Supply Disposable (PREVAIL SUPER PLUS XLARGE) Misc  Active   lamoTRIgine (LAMICTAL) 25 MG Tab  Active   levETIRAcetam (KEPPRA) 500 MG Tab  Active   metFORMIN ER (GLUCOPHAGE XR) 500 MG TABLET SR 24 HR  Active   Multiple Vitamins-Minerals (MULTIVITAMIN ADULT, MINERALS,) Tab  Active   permethrin (ELIMITE) 5 % Cream  Active   TRUE VITAMIN B1 100 MG Tab  Active   ziprasidone (GEODON) 60 MG Cap  Active                    ALLERGIES  Allergies   Allergen Reactions    Acetaminophen      Unknown per patient and caregiver    Other Misc      bees    Penicillins Hives     Per patien    Shellfish Allergy Hives     Get hives per patient       PHYSICAL EXAM  VITAL SIGNS: /72   Pulse 74   Temp 36.7 °C (98 °F) (Temporal)   Resp 20   Ht 1.549 m (5' 1\")   Wt 87.8 kg (193 lb 9 oz)   SpO2 95%   BMI 36.57 kg/m²    General: Well- appearing , non-toxic, no acute distress  Neuro: oriented x 3, moving all extremities.   HEENT:   - Head: Normocephalic, atraumatic  - Eyes: PERRL  - Ears/Nose: normal external nose and ears  - Mouth: moist mucosal membranes  Chest: Mild tenderness to palpation right upper chest.  No overlying rash, vesicles  Resp: clear to auscultation, no increased work of breathing  CV: Regular rate and rhythm  Abd: Soft, non-tender, non-distended  Extremities: No peripheral edema  Psych: lucid and conversational         DIAGNOSTIC STUDIES / PROCEDURES    LABS and ECG  Results for orders placed or performed during the hospital encounter of 04/24/25   CBC with Differential    Collection Time: 04/24/25  2:39 PM   Result Value Ref Range    WBC 11.7 (H) 4.8 - 10.8 K/uL    RBC 4.81 4.20 - 5.40 M/uL    Hemoglobin 14.8 12.0 - 16.0 g/dL    Hematocrit 46.1 37.0 - 47.0 %    MCV 95.8 81.4 - 97.8 fL    MCH 30.8 27.0 - 33.0 pg    MCHC 32.1 (L) 32.2 - 35.5 g/dL    RDW 47.8 35.9 - 50.0 fL    Platelet Count 344 164 - 446 K/uL    MPV 10.8 9.0 - 12.9 fL    Neutrophils-Polys 74.10 (H) 44.00 - 72.00 %    Lymphocytes " 17.80 (L) 22.00 - 41.00 %    Monocytes 6.10 0.00 - 13.40 %    Eosinophils 0.80 0.00 - 6.90 %    Basophils 0.90 0.00 - 1.80 %    Immature Granulocytes 0.30 0.00 - 0.90 %    Nucleated RBC 0.00 0.00 - 0.20 /100 WBC    Neutrophils (Absolute) 8.68 (H) 1.82 - 7.42 K/uL    Lymphs (Absolute) 2.08 1.00 - 4.80 K/uL    Monos (Absolute) 0.71 0.00 - 0.85 K/uL    Eos (Absolute) 0.09 0.00 - 0.51 K/uL    Baso (Absolute) 0.10 0.00 - 0.12 K/uL    Immature Granulocytes (abs) 0.03 0.00 - 0.11 K/uL    NRBC (Absolute) 0.00 K/uL   Complete Metabolic Panel (CMP)    Collection Time: 04/24/25  2:39 PM   Result Value Ref Range    Sodium 143 135 - 145 mmol/L    Potassium 4.6 3.6 - 5.5 mmol/L    Chloride 107 96 - 112 mmol/L    Co2 24 20 - 33 mmol/L    Anion Gap 12.0 7.0 - 16.0    Glucose 90 65 - 99 mg/dL    Bun 10 8 - 22 mg/dL    Creatinine 0.70 0.50 - 1.40 mg/dL    Calcium 9.4 8.5 - 10.5 mg/dL    Correct Calcium 9.2 8.5 - 10.5 mg/dL    AST(SGOT) 21 12 - 45 U/L    ALT(SGPT) <5 2 - 50 U/L    Alkaline Phosphatase 70 30 - 99 U/L    Total Bilirubin 0.4 0.1 - 1.5 mg/dL    Albumin 4.2 3.2 - 4.9 g/dL    Total Protein 7.2 6.0 - 8.2 g/dL    Globulin 3.0 1.9 - 3.5 g/dL    A-G Ratio 1.4 g/dL   proBrain Natriuretic Peptide, NT (BNP_    Collection Time: 04/24/25  2:39 PM   Result Value Ref Range    NT-proBNP 62 0 - 125 pg/mL   Troponins NOW    Collection Time: 04/24/25  2:39 PM   Result Value Ref Range    Troponin T <6 6 - 19 ng/L   HCG Qual Serum    Collection Time: 04/24/25  2:39 PM   Result Value Ref Range    Beta-Hcg Qualitative Serum Negative Negative   ESTIMATED GFR    Collection Time: 04/24/25  2:39 PM   Result Value Ref Range    GFR (CKD-EPI) 107 >60 mL/min/1.73 m 2   EKG (NOW)    Collection Time: 04/24/25  4:49 PM   Result Value Ref Range    Report       [facility] Emergency Dept.    Test Date:  2025-04-24  Pt Name:    PHU RICHARDS                  Department: ER  MRN:        [id number]                      Room:  Gender:     F                             Technician: [id number]  :        1980                   Requested By:ER TRIAGE PROTOCOL  Order #:    [id number]                    Reading MD: Smith Carney    Measurements  Intervals                                Axis  Rate:       82                           P:          52  OK:         153                          QRS:        39  QRSD:       73                           T:          38  QT:         359  QTc:        420    Interpretive Statements  Sinus rhythm  No acute st or t changes  Electronically Signed On 2025 16:49:38 PDT by Smith Carney     Troponins in two (2) hours    Collection Time: 25  5:15 PM   Result Value Ref Range    Troponin T 7 6 - 19 ng/L   D-DIMER    Collection Time: 25  5:15 PM   Result Value Ref Range    D-Dimer <0.27 0.00 - 0.50 ug/mL (U)       RADIOLOGY  I have independently interpreted the diagnostic imaging associated with this visit and am waiting the final reading from the radiologist.   My preliminary interpretation is as follows:   - Chest x-ray shows no acute cardiopulmonary process  Radiologist interpretation:   DX-CHEST-PORTABLE (1 VIEW)   Final Result         1. No acute cardiopulmonary abnormalities are identified.              MEDICAL DECISION MAKING      ED COURSE AND PLAN    44-year-old female with a history of developmental delay presenting to the emergency department for right upper chest pain.  On arrival patient is well-appearing, she has a reassuring EKG and physical exam.  She does have reproducible chest pain to palpation of the right chest which is similar to the last time she was here in the emergency department in 2024 presenting for chest pain.    Considered broad differential for her chest pain including ACS, PE, dissection, pleurisy, pneumothorax, pericarditis, pneumonia.  Low suspicion for ACS, PE.  Heart score is 2, Wells score is low risk,  D-dimer was obtained is normal.  Troponin is negative x 2.  Her  labs are otherwise unremarkable and chest x-ray showed no evidence of acute cardiopulmonary process.    No indication for further workup in the emergency department or admission for workup of her chest wall pain.  Patient is appropriate for discharge home, follow-up with PCP.  I discussed strict return precautions with patient and her care provider.           CHEST PAIN:   HEART Score for Major Cardiac Events  HEART Score     History: Slightly suspicious  ECG: Normal  Age: 45-64  Risk Factors: 1-2 risk factors  Troponin: Less than or equal to normal limit    Heart Score: 2    Total Score   0-3 Points = Low Score, risk of MACE 0.9-1.7%.  4-6 Points = Moderate Score, risk of MACE 12-16.6%  7-10 Points = High Score, risk of MACE 50-65%    Procedures:      ----------------------------------------------------------------------------------  DISCUSSIONS    I have discussed management of the patient with the following physicians and JAQCUIE's:      Discussion of management with other [facility] or appropriate source(s):     Escalation of care considered, and ultimately not performed: Considered hospitalization, considered CT pulmonary angiogram, CT angiogram of the chest    Barriers to care at this time, including but not limited to:     Decision tools and prescription drugs considered including, but not limited to: Heart score, Wells score    FINAL IMPRESSION    1. Chest pain, unspecified type        Discharge Medication List as of 4/24/2025  7:10 PM          No follow-up provider specified.      DISPOSITION    Discharge home, Stable      This chart was dictated using an electronic voice recognition software. The chart has been reviewed and edited but there is still possibility for dictation errors due to limitation of software.    Smith Carney,  4/24/2025

## 2025-04-25 NOTE — DISCHARGE INSTRUCTIONS
You were seen in the emergency department for chest pain.    Based on your history, physical exam, EKG, chest x-ray, labs, there is no evidence of an acute emergent process today.    Please follow-up with your primary care physician within the next 2 to 3 days for reevaluation.    If you are having worsening chest pain, shortness of breath, palpitations you should be reevaluated in the emergency department immediately.

## 2025-04-25 NOTE — ED NOTES
IV med given    Call to [facility], updated information pertaining to dc given, will fax dc paperwork to [phone]

## 2025-05-06 RX ORDER — THIAMINE MONONITRATE (VIT B1) 100 MG
100 TABLET ORAL DAILY
Qty: 30 TABLET | Refills: 0 | Status: SHIPPED | OUTPATIENT
Start: 2025-05-06

## 2025-05-06 NOTE — TELEPHONE ENCOUNTER
Received request via: Pharmacy    Was the patient seen in the last year in this department? Yes    Does the patient have an active prescription (recently filled or refills available) for medication(s) requested? No    Pharmacy Name:  [facility] - [place], [place] - [address]     Does the patient have CHCF Plus and need 100-day supply? (This applies to ALL medications) Patient does not have SCP

## 2025-05-08 ENCOUNTER — OFFICE VISIT (OUTPATIENT)
Dept: MEDICAL GROUP | Facility: CLINIC | Age: 45
End: 2025-05-08
Payer: MEDICARE

## 2025-05-08 VITALS
SYSTOLIC BLOOD PRESSURE: 123 MMHG | TEMPERATURE: 98.6 F | HEIGHT: 61 IN | HEART RATE: 85 BPM | OXYGEN SATURATION: 96 % | DIASTOLIC BLOOD PRESSURE: 85 MMHG | BODY MASS INDEX: 38.01 KG/M2 | WEIGHT: 201.3 LBS

## 2025-05-08 DIAGNOSIS — K21.9 GASTROESOPHAGEAL REFLUX DISEASE, UNSPECIFIED WHETHER ESOPHAGITIS PRESENT: ICD-10-CM

## 2025-05-08 PROCEDURE — 3074F SYST BP LT 130 MM HG: CPT | Performed by: STUDENT IN AN ORGANIZED HEALTH CARE EDUCATION/TRAINING PROGRAM

## 2025-05-08 PROCEDURE — 99213 OFFICE O/P EST LOW 20 MIN: CPT | Performed by: STUDENT IN AN ORGANIZED HEALTH CARE EDUCATION/TRAINING PROGRAM

## 2025-05-08 PROCEDURE — 3079F DIAST BP 80-89 MM HG: CPT | Performed by: STUDENT IN AN ORGANIZED HEALTH CARE EDUCATION/TRAINING PROGRAM

## 2025-05-08 RX ORDER — OMEPRAZOLE 20 MG/1
20 CAPSULE, DELAYED RELEASE ORAL DAILY
Qty: 30 CAPSULE | Refills: 1 | Status: SHIPPED | OUTPATIENT
Start: 2025-05-08

## 2025-05-08 ASSESSMENT — FIBROSIS 4 INDEX: FIB4 SCORE: 1.32

## 2025-05-08 NOTE — PROGRESS NOTES
"Subjective:     CC: Chest pain    HPI:   Erin presents today with complaints of chest pain that has been occurring over the last month.  She describes pain in the center of her chest, worse with laying down.  Will sometimes have some changing in breathing.  No nausea, vomiting, change in bowel habits.  Does not notice a change with or without eating.  They do report that she had to be seen in the emergency department at the end of last month due to ongoing chest pain.    No problems updated.    Current Outpatient Medications Ordered in Epic   Medication Sig Dispense Refill    TRUE VITAMIN B1 100 MG Tab TAKE 1 TABLET BY MOUTH ONCE DAILY 30 Tablet 0    Multiple Vitamins-Minerals (MULTIVITAMIN ADULT, MINERALS,) Tab Take 1 Tablet by mouth every day. 30 Tablet 0    metFORMIN ER (GLUCOPHAGE XR) 500 MG TABLET SR 24 HR TAKE 1 TABLET BY MOUTH ONCE DAILY FOR PRE-DIABETES. 90 Tablet 3    permethrin (ELIMITE) 5 % Cream Apply to affected areas topically from head to toe, leave on overnight, and wash off in the morning.  Repeat in 1 week 60 g 0    cetirizine (ZYRTEC) 10 MG Tab Take 1 Tablet by mouth every day. 30 Tablet 0    Incontinence Supply Disposable (PREVAIL SUPER PLUS XLARGE) Misc WEAR DAILY, CHANGE AS NEEDED. 98 Each 3    lamoTRIgine (LAMICTAL) 25 MG Tab Take 1 Tab by mouth every day. 90 Tab 1    carbidopa-levodopa (SINEMET)  MG Tab Take 1 Tab by mouth 3 times a day. 270 Tab 1    levETIRAcetam (KEPPRA) 500 MG Tab Take 1 Tab by mouth 3 times a day. 270 Tab 1    ziprasidone (GEODON) 60 MG Cap Take 60 mg by mouth every evening.       No current Epic-ordered facility-administered medications on file.       Health Maintenance: Completed    ROS:  See HPI    Objective:     Exam:  /85 (BP Location: Left arm, Patient Position: Sitting, BP Cuff Size: Adult)   Pulse 85   Temp 37 °C (98.6 °F) (Temporal)   Ht 1.549 m (5' 1\")   Wt 91.3 kg (201 lb 4.8 oz)   SpO2 96%   BMI 38.04 kg/m²  Body mass index is 38.04 " kg/m².    Physical Exam  Vitals and nursing note reviewed.   Constitutional:       General: She is not in acute distress.  Eyes:      Conjunctiva/sclera: Conjunctivae normal.   Cardiovascular:      Rate and Rhythm: Normal rate and regular rhythm.   Pulmonary:      Effort: Pulmonary effort is normal.      Breath sounds: Normal breath sounds.   Abdominal:      Palpations: Abdomen is soft.      Tenderness: There is no abdominal tenderness.   Musculoskeletal:      Cervical back: Neck supple.   Skin:     General: Skin is warm.   Neurological:      Mental Status: She is alert. Mental status is at baseline.         Labs: Reviewed labs from 4/20/2025    Assessment & Plan:     44 y.o. female with the following -     Assessment & Plan  Gastroesophageal reflux disease, unspecified whether esophagitis present  Patient presented today to discuss ongoing chest pain.  This has been occurring over the last month.  Sensation/pain worsens when laying down.  Does not notice a difference with or without eating.  She was seen in the Emergency Department 4/24/2025 where she had a workup done that was negative including EKG with no acute ST or T changes.  As his pain is typically worse with laying down, differential does include acid reflux.  She is not currently on acid reducing medication.  Discussed recommendation for initiation of PPI to evaluate for improvement.  If no improvement within 30 days, consider alternative diagnosis.  Does not appear to be cardiac in nature given negative workup in the emergency department and ongoing for the last month. If chest pain worsens or recurs, recommendation is to take the Emergency Department.  Orders:    omeprazole (PRILOSEC) 20 MG delayed-release capsule; Take 1 Capsule by mouth every day.      Return if symptoms worsen or fail to improve.      Paulette Gomez MD  [facility]

## 2025-05-16 NOTE — TELEPHONE ENCOUNTER
Received request via: Pharmacy    Was the patient seen in the last year in this department? Yes    Does the patient have an active prescription (recently filled or refills available) for medication(s) requested? No    Pharmacy Name: [facility] - [place], [place] - [address]     Does the patient have FDC Plus and need 100-day supply? (This applies to ALL medications) Patient does not have SCP

## 2025-06-03 NOTE — TELEPHONE ENCOUNTER
Received request via: Pharmacy    Was the patient seen in the last year in this department? Yes    Does the patient have an active prescription (recently filled or refills available) for medication(s) requested? No    Pharmacy Name: [facility] - [place], [place] - [address]      Does the patient have longterm Plus and need 100-day supply? (This applies to ALL medications) Patient does not have SCP

## 2025-06-06 RX ORDER — THIAMINE MONONITRATE (VIT B1) 100 MG
100 TABLET ORAL DAILY
Qty: 30 TABLET | Refills: 0 | Status: SHIPPED | OUTPATIENT
Start: 2025-06-06

## 2025-06-27 NOTE — PROGRESS NOTES
"Subjective:     CC: Follow-up    HPI:   Erin presents today with    Lab follow-up  Patient here for follow-up on labs.  CMP, lipid, and TSH normal.  A1c slightly elevated 5.8.  Patient reports feeling well today.  Per caregiver, patient has follow-up with neurologist and OB/GYN.    No problems updated.    ROS: See HPI.     Objective:     Exam:  BP (P) 106/78 (BP Location: Left arm, Patient Position: Sitting, BP Cuff Size: Large adult)   Pulse (P) 95   Ht 1.549 m (5' 1\")   Wt 105 kg (232 lb)   SpO2 (P) 93%   BMI 43.84 kg/m²  Body mass index is 43.84 kg/m².    Physical Exam:  General: Pt resting in NAD, cooperative   Skin:  No cyanosis or jaundice   HEENT: NC/AT. EOMI. No conjunctival injection or sclera icterus.   Lungs:  CTAB, good air movement. Non-labored.   Cardiovascular:  S1/S2 RRR   Extremities:  No LE edema       Assessment & Plan:     42 y.o. female with the following -     Problem List Items Addressed This Visit       Prediabetes     A1c 5.8  Patient is currently on metformin 500 mg daily  Discussed option of increasing dosage of metformin or rechecking in 3 months.  Patient and caregiver in agreement to recheck A1c in 3 months.  At that time we will consider increasing dosage of metformin. Discussed healthy diet and exercise.                "
100.7

## 2025-07-02 DIAGNOSIS — K21.9 GASTROESOPHAGEAL REFLUX DISEASE, UNSPECIFIED WHETHER ESOPHAGITIS PRESENT: ICD-10-CM

## 2025-07-02 RX ORDER — OMEPRAZOLE 20 MG/1
20 CAPSULE, DELAYED RELEASE ORAL DAILY
Qty: 30 CAPSULE | Refills: 2 | Status: SHIPPED | OUTPATIENT
Start: 2025-07-02

## 2025-07-02 RX ORDER — THIAMINE MONONITRATE (VIT B1) 100 MG
100 TABLET ORAL DAILY
Qty: 30 TABLET | Refills: 2 | Status: SHIPPED | OUTPATIENT
Start: 2025-07-02

## 2025-07-02 NOTE — TELEPHONE ENCOUNTER
Received request via: Pharmacy    Was the patient seen in the last year in this department? Yes    Does the patient have an active prescription (recently filled or refills available) for medication(s) requested? No    Pharmacy Name:   [facility] [place] [facility] - [address] [facility]  [address] [facility]  [place] [address]  Phone: [phone] Fax: [phone]        Does the patient have USP Plus and need 100-day supply? (This applies to ALL medications) Patient does not have SCP

## 2025-07-02 NOTE — TELEPHONE ENCOUNTER
Received request via: Pharmacy    Was the patient seen in the last year in this department? Yes    Does the patient have an active prescription (recently filled or refills available) for medication(s) requested? No    Pharmacy Name:   [facility] [place] [facility] - [address] [facility]  [address] [facility]  [place] [address]  Phone: [phone] Fax: [phone]      Does the patient have shelter Plus and need 100-day supply? (This applies to ALL medications) Patient does not have SCP

## 2025-07-14 ENCOUNTER — APPOINTMENT (OUTPATIENT)
Dept: RADIOLOGY | Facility: MEDICAL CENTER | Age: 45
End: 2025-07-14
Attending: EMERGENCY MEDICINE
Payer: MEDICARE

## 2025-07-14 ENCOUNTER — HOSPITAL ENCOUNTER (EMERGENCY)
Facility: MEDICAL CENTER | Age: 45
End: 2025-07-14
Attending: EMERGENCY MEDICINE
Payer: MEDICARE

## 2025-07-14 VITALS
OXYGEN SATURATION: 95 % | RESPIRATION RATE: 15 BRPM | SYSTOLIC BLOOD PRESSURE: 130 MMHG | WEIGHT: 200 LBS | HEART RATE: 79 BPM | DIASTOLIC BLOOD PRESSURE: 73 MMHG | TEMPERATURE: 98.4 F | BODY MASS INDEX: 37.79 KG/M2

## 2025-07-14 DIAGNOSIS — M94.0 COSTOCHONDRITIS: Primary | ICD-10-CM

## 2025-07-14 LAB
ALBUMIN SERPL BCP-MCNC: 3.9 G/DL (ref 3.2–4.9)
ALBUMIN/GLOB SERPL: 1.5 G/DL
ALP SERPL-CCNC: 81 U/L (ref 30–99)
ALT SERPL-CCNC: 10 U/L (ref 2–50)
ANION GAP SERPL CALC-SCNC: 11 MMOL/L (ref 7–16)
AST SERPL-CCNC: 25 U/L (ref 12–45)
BASOPHILS # BLD AUTO: 0.7 % (ref 0–1.8)
BASOPHILS # BLD: 0.08 K/UL (ref 0–0.12)
BILIRUB SERPL-MCNC: 0.4 MG/DL (ref 0.1–1.5)
BUN SERPL-MCNC: 12 MG/DL (ref 8–22)
CALCIUM ALBUM COR SERPL-MCNC: 9.2 MG/DL (ref 8.5–10.5)
CALCIUM SERPL-MCNC: 9.1 MG/DL (ref 8.5–10.5)
CHLORIDE SERPL-SCNC: 104 MMOL/L (ref 96–112)
CO2 SERPL-SCNC: 22 MMOL/L (ref 20–33)
CREAT SERPL-MCNC: 0.75 MG/DL (ref 0.5–1.4)
EKG IMPRESSION: NORMAL
EOSINOPHIL # BLD AUTO: 0.2 K/UL (ref 0–0.51)
EOSINOPHIL NFR BLD: 1.7 % (ref 0–6.9)
ERYTHROCYTE [DISTWIDTH] IN BLOOD BY AUTOMATED COUNT: 43.9 FL (ref 35.9–50)
GFR SERPLBLD CREATININE-BSD FMLA CKD-EPI: 99 ML/MIN/1.73 M 2
GLOBULIN SER CALC-MCNC: 2.6 G/DL (ref 1.9–3.5)
GLUCOSE SERPL-MCNC: 101 MG/DL (ref 65–99)
HCG SERPL QL: NEGATIVE
HCT VFR BLD AUTO: 43.3 % (ref 37–47)
HGB BLD-MCNC: 14 G/DL (ref 12–16)
IMM GRANULOCYTES # BLD AUTO: 0.05 K/UL (ref 0–0.11)
IMM GRANULOCYTES NFR BLD AUTO: 0.4 % (ref 0–0.9)
LYMPHOCYTES # BLD AUTO: 2.98 K/UL (ref 1–4.8)
LYMPHOCYTES NFR BLD: 25.4 % (ref 22–41)
MCH RBC QN AUTO: 30.4 PG (ref 27–33)
MCHC RBC AUTO-ENTMCNC: 32.3 G/DL (ref 32.2–35.5)
MCV RBC AUTO: 93.9 FL (ref 81.4–97.8)
MONOCYTES # BLD AUTO: 0.84 K/UL (ref 0–0.85)
MONOCYTES NFR BLD AUTO: 7.1 % (ref 0–13.4)
NEUTROPHILS # BLD AUTO: 7.6 K/UL (ref 1.82–7.42)
NEUTROPHILS NFR BLD: 64.7 % (ref 44–72)
NRBC # BLD AUTO: 0 K/UL
NRBC BLD-RTO: 0 /100 WBC (ref 0–0.2)
NT-PROBNP SERPL IA-MCNC: 51 PG/ML (ref 0–125)
PLATELET # BLD AUTO: 369 K/UL (ref 164–446)
PMV BLD AUTO: 9.6 FL (ref 9–12.9)
POTASSIUM SERPL-SCNC: 4.9 MMOL/L (ref 3.6–5.5)
PROT SERPL-MCNC: 6.5 G/DL (ref 6–8.2)
RBC # BLD AUTO: 4.61 M/UL (ref 4.2–5.4)
SODIUM SERPL-SCNC: 137 MMOL/L (ref 135–145)
TROPONIN T SERPL-MCNC: 19 NG/L (ref 6–19)
TROPONIN T SERPL-MCNC: <6 NG/L (ref 6–19)
WBC # BLD AUTO: 11.8 K/UL (ref 4.8–10.8)

## 2025-07-14 PROCEDURE — 71045 X-RAY EXAM CHEST 1 VIEW: CPT

## 2025-07-14 PROCEDURE — 80053 COMPREHEN METABOLIC PANEL: CPT

## 2025-07-14 PROCEDURE — 84703 CHORIONIC GONADOTROPIN ASSAY: CPT

## 2025-07-14 PROCEDURE — 36415 COLL VENOUS BLD VENIPUNCTURE: CPT

## 2025-07-14 PROCEDURE — 93005 ELECTROCARDIOGRAM TRACING: CPT | Mod: TC

## 2025-07-14 PROCEDURE — 83880 ASSAY OF NATRIURETIC PEPTIDE: CPT

## 2025-07-14 PROCEDURE — 84484 ASSAY OF TROPONIN QUANT: CPT

## 2025-07-14 PROCEDURE — 99284 EMERGENCY DEPT VISIT MOD MDM: CPT

## 2025-07-14 PROCEDURE — 85025 COMPLETE CBC W/AUTO DIFF WBC: CPT

## 2025-07-14 PROCEDURE — 93005 ELECTROCARDIOGRAM TRACING: CPT | Mod: TC | Performed by: EMERGENCY MEDICINE

## 2025-07-14 RX ORDER — IBUPROFEN 800 MG/1
800 TABLET, FILM COATED ORAL EVERY 8 HOURS PRN
Qty: 15 TABLET | Refills: 0 | Status: SHIPPED | OUTPATIENT
Start: 2025-07-14

## 2025-07-14 ASSESSMENT — FIBROSIS 4 INDEX: FIB4 SCORE: 1.32

## 2025-07-14 NOTE — ED TRIAGE NOTES
"Erin Santiago  44 y.o. female  Chief Complaint   Patient presents with    Chest Pain     Reports 10/10 \"sharp\" substernal CP       Pt [facility]LEX [facility] from pt's group home for above complaint. Pt to triage via wheelchair.  Pt is alert and oriented, speaking in full sentences, follows commands and responds appropriately to questions. Not in any apparent distress. Respirations are even and unlabored.  Pt placed in line for blood draw. Pt educated on triage process. Pt encouraged to alert staff for any changes.    "

## 2025-07-15 NOTE — ED PROVIDER NOTES
"ED Provider Note    CHIEF COMPLAINT  Chief Complaint   Patient presents with    Chest Pain     Reports 10/10 \"sharp\" substernal CP       EXTERNAL RECORDS REVIEWED  Outpatient Notes  [facility]    HPI/ROS  LIMITATION TO HISTORY   None   OUTSIDE HISTORIAN(S):  Caregiver states patient was complaining of chest pain since earlier today.    Erin Santiago is a 44 y.o. female who presents here for evaluation of chest pain.  Patient states that she has had right sided chest pain over the last day or 2.  She states that it is worse on the right side, worse when she presses on it, but not associated with shortness of breath.  She has no radiation of the pain.  No fever chills or vomiting.  Patient denies any history of the same.    PAST MEDICAL HISTORY   has a past medical history of Anxiety, Depression, Diabetes, Epilepsy (HCC), Mental disability, Sleep apnea, Snoring, and Unspecified urinary incontinence.    SURGICAL HISTORY   has a past surgical history that includes lesion excision general (2/20/2014).    FAMILY HISTORY  No family history on file.    SOCIAL HISTORY  Social History     Tobacco Use    Smoking status: Former     Types: Cigarettes    Smokeless tobacco: Never   Vaping Use    Vaping status: Never Used   Substance and Sexual Activity    Alcohol use: No    Drug use: No    Sexual activity: Never       CURRENT MEDICATIONS  Home Medications       Reviewed by Myra Lucio R.N. (Registered Nurse) on 07/14/25 at 1632  Med List Status: Not Addressed     Medication Last Dose Status   carbidopa-levodopa (SINEMET)  MG Tab  Active   cetirizine (ZYRTEC) 10 MG Tab  Active   Incontinence Supply Disposable (PREVAIL SUPER PLUS XLARGE) Misc  Active   lamoTRIgine (LAMICTAL) 25 MG Tab  Active   levETIRAcetam (KEPPRA) 500 MG Tab  Active   metFORMIN ER (GLUCOPHAGE XR) 500 MG TABLET SR 24 HR  Active   Multiple Vitamins-Minerals (MULTIVITAMIN ADULT, MINERALS,) Tab  Active   omeprazole (PRILOSEC) 20 MG " delayed-release capsule  Active   permethrin (ELIMITE) 5 % Cream  Active   thiamine (TRUE VITAMIN B1) 100 MG Tab  Active   ziprasidone (GEODON) 60 MG Cap  Active                  Audit from Redirected Encounters    **Home medications have not yet been reviewed for this encounter**         ALLERGIES  Allergies[1]    PHYSICAL EXAM  VITAL SIGNS: BP (!) 131/100   Pulse 80   Temp 36.9 °C (98.4 °F) (Temporal)   Resp 17   Wt 90.7 kg (200 lb)   SpO2 95%   BMI 37.79 kg/m²    Constitutional: Well developed, well nourished. No acute distress.  HEENT: Normocephalic, atraumatic. Posterior pharynx clear and moist.  Eyes:  EOMI. Normal sclera.  Neck: Supple, Full range of motion, nontender.  Chest/Pulmonary: clear to ausculation. Symmetrical expansion.  Reproducible chest wall tenderness to palpation  Cardio: Regular rate and rhythm with no murmur.   Abdomen: Soft, nontender. No peritoneal signs. No guarding. No palpable masses.  Musculoskeletal: No deformity, no edema, neurovascular intact.   Neuro: Clear speech, appropriate, cooperative, cranial nerves II-XII grossly intact.  Psych: Normal mood and affect    EKG; normal sinus rhythm rate of 80.  No ST elevation, no ST depression.  QTc is 434.  Compared to EKG from 2025.    EKG/LABS  Results for orders placed or performed during the hospital encounter of 25   EKG    Collection Time: 25  4:27 PM   Result Value Ref Range    Report       [facility] Emergency Dept.    Test Date:  2025  Pt Name:    PHU RICHARDS                  Department: ER  MRN:        [id number]                      Room:  Gender:     Female                       Technician: [id number]  :        1980                   Requested By:ER TRIAGE PROTOCOL  Order #:    [id number]                    Reading MD:    Measurements  Intervals                                Axis  Rate:       80                           P:          54  CT:         142                          QRS:         44  QRSD:       87                           T:          34  QT:         376  QTc:        434    Interpretive Statements  Sinus rhythm  Compared to ECG 04/24/2025 14:10:21  No significant changes     CBC with Differential    Collection Time: 07/14/25  4:38 PM   Result Value Ref Range    WBC 11.8 (H) 4.8 - 10.8 K/uL    RBC 4.61 4.20 - 5.40 M/uL    Hemoglobin 14.0 12.0 - 16.0 g/dL    Hematocrit 43.3 37.0 - 47.0 %    MCV 93.9 81.4 - 97.8 fL    MCH 30.4 27.0 - 33.0 pg    MCHC 32.3 32.2 - 35.5 g/dL    RDW 43.9 35.9 - 50.0 fL    Platelet Count 369 164 - 446 K/uL    MPV 9.6 9.0 - 12.9 fL    Neutrophils-Polys 64.70 44.00 - 72.00 %    Lymphocytes 25.40 22.00 - 41.00 %    Monocytes 7.10 0.00 - 13.40 %    Eosinophils 1.70 0.00 - 6.90 %    Basophils 0.70 0.00 - 1.80 %    Immature Granulocytes 0.40 0.00 - 0.90 %    Nucleated RBC 0.00 0.00 - 0.20 /100 WBC    Neutrophils (Absolute) 7.60 (H) 1.82 - 7.42 K/uL    Lymphs (Absolute) 2.98 1.00 - 4.80 K/uL    Monos (Absolute) 0.84 0.00 - 0.85 K/uL    Eos (Absolute) 0.20 0.00 - 0.51 K/uL    Baso (Absolute) 0.08 0.00 - 0.12 K/uL    Immature Granulocytes (abs) 0.05 0.00 - 0.11 K/uL    NRBC (Absolute) 0.00 K/uL   Complete Metabolic Panel (CMP)    Collection Time: 07/14/25  4:38 PM   Result Value Ref Range    Sodium 137 135 - 145 mmol/L    Potassium 4.9 3.6 - 5.5 mmol/L    Chloride 104 96 - 112 mmol/L    Co2 22 20 - 33 mmol/L    Anion Gap 11.0 7.0 - 16.0    Glucose 101 (H) 65 - 99 mg/dL    Bun 12 8 - 22 mg/dL    Creatinine 0.75 0.50 - 1.40 mg/dL    Calcium 9.1 8.5 - 10.5 mg/dL    Correct Calcium 9.2 8.5 - 10.5 mg/dL    AST(SGOT) 25 12 - 45 U/L    ALT(SGPT) 10 2 - 50 U/L    Alkaline Phosphatase 81 30 - 99 U/L    Total Bilirubin 0.4 0.1 - 1.5 mg/dL    Albumin 3.9 3.2 - 4.9 g/dL    Total Protein 6.5 6.0 - 8.2 g/dL    Globulin 2.6 1.9 - 3.5 g/dL    A-G Ratio 1.5 g/dL   proBrain Natriuretic Peptide, NT (BNP_    Collection Time: 07/14/25  4:38 PM   Result Value Ref Range    NT-proBNP 51 0 -  125 pg/mL   Troponins NOW    Collection Time: 07/14/25  4:38 PM   Result Value Ref Range    Troponin T 19 6 - 19 ng/L   HCG Qual Serum    Collection Time: 07/14/25  4:38 PM   Result Value Ref Range    Beta-Hcg Qualitative Serum Negative Negative   ESTIMATED GFR    Collection Time: 07/14/25  4:38 PM   Result Value Ref Range    GFR (CKD-EPI) 99 >60 mL/min/1.73 m 2   Troponins in two (2) hours    Collection Time: 07/14/25  6:58 PM   Result Value Ref Range    Troponin T <6 6 - 19 ng/L         RADIOLOGY/PROCEDURES   I have independently interpreted the diagnostic imaging associated with this visit and am waiting the final reading from the radiologist.   My preliminary interpretation is as follows: Below    Radiologist interpretation:  DX-CHEST-PORTABLE (1 VIEW)   Final Result      1.  Linear areas of parenchymal scarring in the lateral aspect of the right lung base.   2.  The remainder of the chest radiography is within normal limits.          COURSE & MEDICAL DECISION MAKING    ASSESSMENT, COURSE AND PLAN  Care Narrative: This is a 44-year-old female here for evaluation of reproducible right-sided chest pain.  Patient has 2 normal troponins, unremarkable EKG, a heart score of 2, and a negative chest x-ray for any acute finding.  Patient will follow-up as outpatient or return for any further issues or concerns.  She also has reproducible right-sided tenderness on exam.    7:58 PM  I spoke to the pts guardian, who will ensure she follow up.        DISPOSITION AND DISCUSSIONS  I have discussed management of the patient with the following physicians and JACQUIE's: None    Discussion of management with other Hospitals in [place] or appropriate source(s): None     Escalation of care considered, and ultimately not performed: None    Barriers to care at this time, including but not limited to: None.     Decision tools and prescription drugs considered including, but not limited to: None.    FINAL DIAGNOSIS  1. Costochondritis         Electronically  signed by: Tai Tijerina D.O., 7/14/2025 6:00 PM           [1]   Allergies  Allergen Reactions    Acetaminophen      Unknown per patient and caregiver    Other Misc      bees    Penicillins Hives     Per patien    Shellfish Allergy Hives     Get hives per patient

## 2025-08-01 RX ORDER — THIAMINE MONONITRATE (VIT B1) 100 MG
100 TABLET ORAL DAILY
Qty: 30 TABLET | Refills: 0 | Status: SHIPPED | OUTPATIENT
Start: 2025-08-01

## 2025-08-06 ENCOUNTER — APPOINTMENT (OUTPATIENT)
Dept: MEDICAL GROUP | Facility: CLINIC | Age: 45
End: 2025-08-06
Payer: MEDICARE